# Patient Record
Sex: MALE | Race: AMERICAN INDIAN OR ALASKA NATIVE | NOT HISPANIC OR LATINO | Employment: OTHER | URBAN - METROPOLITAN AREA
[De-identification: names, ages, dates, MRNs, and addresses within clinical notes are randomized per-mention and may not be internally consistent; named-entity substitution may affect disease eponyms.]

---

## 2017-03-22 ENCOUNTER — ALLSCRIPTS OFFICE VISIT (OUTPATIENT)
Dept: OTHER | Facility: OTHER | Age: 57
End: 2017-03-22

## 2017-10-03 ENCOUNTER — ALLSCRIPTS OFFICE VISIT (OUTPATIENT)
Dept: OTHER | Facility: OTHER | Age: 57
End: 2017-10-03

## 2017-10-04 DIAGNOSIS — N52.9 MALE ERECTILE DYSFUNCTION: ICD-10-CM

## 2017-10-04 DIAGNOSIS — Z12.5 ENCOUNTER FOR SCREENING FOR MALIGNANT NEOPLASM OF PROSTATE: ICD-10-CM

## 2017-10-04 DIAGNOSIS — J30.9 ALLERGIC RHINITIS: ICD-10-CM

## 2017-10-13 ENCOUNTER — GENERIC CONVERSION - ENCOUNTER (OUTPATIENT)
Dept: OTHER | Facility: OTHER | Age: 57
End: 2017-10-13

## 2017-10-13 ENCOUNTER — LAB CONVERSION - ENCOUNTER (OUTPATIENT)
Dept: OTHER | Facility: OTHER | Age: 57
End: 2017-10-13

## 2017-10-13 LAB
PROSTATE SPECIFIC ANTIGEN TOTAL (HISTORICAL): 0.4 NG/ML
QUESTION/PROBLEM (HISTORICAL): NORMAL

## 2017-10-15 ENCOUNTER — LAB CONVERSION - ENCOUNTER (OUTPATIENT)
Dept: OTHER | Facility: OTHER | Age: 57
End: 2017-10-15

## 2017-10-15 LAB
A/G RATIO (HISTORICAL): 1.7 (CALC) (ref 1–2.5)
ALBUMIN SERPL BCP-MCNC: 4.2 G/DL (ref 3.6–5.1)
ALP SERPL-CCNC: 56 U/L (ref 40–115)
ALT SERPL W P-5'-P-CCNC: 21 U/L (ref 9–46)
AST SERPL W P-5'-P-CCNC: 23 U/L (ref 10–35)
BILIRUB SERPL-MCNC: 1.2 MG/DL (ref 0.2–1.2)
BUN SERPL-MCNC: 16 MG/DL (ref 7–25)
BUN/CREA RATIO (HISTORICAL): ABNORMAL (CALC) (ref 6–22)
CALCIUM SERPL-MCNC: 9 MG/DL (ref 8.6–10.3)
CHLORIDE SERPL-SCNC: 110 MMOL/L (ref 98–110)
CHOLEST SERPL-MCNC: 151 MG/DL
CHOLEST/HDLC SERPL: 3.5 (CALC)
CO2 SERPL-SCNC: 21 MMOL/L (ref 20–31)
CONTACT: (HISTORICAL): NORMAL
CREAT SERPL-MCNC: 1.15 MG/DL (ref 0.7–1.33)
EGFR AFRICAN AMERICAN (HISTORICAL): 83 ML/MIN/1.73M2
EGFR-AMERICAN CALC (HISTORICAL): 71 ML/MIN/1.73M2
GAMMA GLOBULIN (HISTORICAL): 2.5 G/DL (CALC) (ref 1.9–3.7)
GLUCOSE (HISTORICAL): 96 MG/DL (ref 65–99)
HDLC SERPL-MCNC: 43 MG/DL
LDL CHOLESTEROL (HISTORICAL): 91 MG/DL (CALC)
NON-HDL-CHOL (CHOL-HDL) (HISTORICAL): 108 MG/DL (CALC)
POTASSIUM SERPL-SCNC: 4.4 MMOL/L (ref 3.5–5.3)
SODIUM SERPL-SCNC: 149 MMOL/L (ref 135–146)
TEST INFORMATION (HISTORICAL): NORMAL
TEST NAME (HISTORICAL): NORMAL
TOTAL PROTEIN (HISTORICAL): 6.7 G/DL (ref 6.1–8.1)
TRIGL SERPL-MCNC: 84 MG/DL

## 2017-10-16 ENCOUNTER — GENERIC CONVERSION - ENCOUNTER (OUTPATIENT)
Dept: OTHER | Facility: OTHER | Age: 57
End: 2017-10-16

## 2017-10-21 LAB
CHLORIDE SERPL-SCNC: 106 MMOL/L (ref 98–110)
CO2 SERPL-SCNC: 28 MMOL/L (ref 20–31)
POTASSIUM SERPL-SCNC: 4 MMOL/L (ref 3.5–5.3)
SODIUM SERPL-SCNC: 141 MMOL/L (ref 135–146)

## 2017-10-23 ENCOUNTER — GENERIC CONVERSION - ENCOUNTER (OUTPATIENT)
Dept: OTHER | Facility: OTHER | Age: 57
End: 2017-10-23

## 2018-01-13 NOTE — RESULT NOTES
Verified Results  (1) LIPID PANEL, FASTING [id number] 09:00AM Mike Garcia     Test Name Result Flag Reference   CHOLESTEROL, TOTAL 151 mg/dL  <200   HDL CHOLESTEROL 43 mg/dL  >73   TRIGLICERIDES 84 mg/dL  <001   LDL-CHOLESTEROL 91 mg/dL (calc)     Reference range: <100     Desirable range <100 mg/dL for patients with CHD or  diabetes and <70 mg/dL for diabetic patients with  known heart disease  LDL-C is now calculated using the Theron-Dumont   calculation, which is a validated novel method providing   better accuracy than the Friedewald equation in the   estimation of LDL-C  Beto Souza  [address]  3225;407(87): 1175-5087   (http://[employer]/faq/RNB235)   CHOL/HDLC RATIO 3 5 (calc)  <5 0    See Below     We received your handwritten test order and  performed the AMA defined lipid panel  If  this is not what you intended to order, please  contact your local   immediately so that we may adjust our billing  appropriately  You may also inquire about  alternative or additional testing  NON HDL CHOLESTEROL 108 mg/dL (calc)  <130   For patients with diabetes plus 1 major ASCVD risk   factor, treating to a non-HDL-C goal of <100 mg/dL   (LDL-C of <70 mg/dL) is considered a therapeutic   option  (1) COMPREHENSIVE METABOLIC PANEL [id number] 54:08FB Inell Risen     Test Name Result Flag Reference   GLUCOSE 96 mg/dL  65-99   Fasting reference interval   UREA NITROGEN (BUN) 16 mg/dL  7-25   CREATININE 1 15 mg/dL  0 70-1 33   For patients >52years of age, the reference limit  for Creatinine is approximately 13% higher for people  identified as -American  eGFR NON-AFR   AMERICAN 71 mL/min/1 73m2  > OR = 60   eGFR AFRICAN AMERICAN 83 mL/min/1 73m2  > OR = 60   BUN/CREATININE RATIO   9-27   NOT APPLICABLE (calc)   SODIUM 149 mmol/L H 135-146   POTASSIUM 4 4 mmol/L  3 5-5 3   CHLORIDE 110 mmol/L     CARBON DIOXIDE 21 mmol/L  20-31   CALCIUM 9 0 mg/dL 8  6-10 3   PROTEIN, TOTAL 6 7 g/dL  6 1-8 1   ALBUMIN 4 2 g/dL  3 6-5 1   GLOBULIN 2 5 g/dL (calc)  1 9-3 7   ALBUMIN/GLOBULIN RATIO 1 7 (calc)  1 0-2 5   BILIRUBIN, TOTAL 1 2 mg/dL  0 2-1 2   ALKALINE PHOSPHATASE 56 U/L     AST 23 U/L  10-35   ALT 21 U/L  9-46     (Q) TEST AUTHORIZATION 12Oct2017 09:00AM Mike Olivarez     Test Name Result Flag Reference   TEST(S) ORDERED ON$REQUISITION      COMPREHENSIVE METABOLIC   TEST CODE: [id number]     CLIENT CONTACT: YOLANDA OLIVAREZ     REPORT ALWAYS MESSAGE$SIGNATURE See Below     The laboratory testing on this patient was verbally requested  or confirmed by the ordering physician or his or her authorized  representative after contact with an employee of [employer]  Federal regulations require that we maintain on file written  authorization for all laboratory testing  Accordingly we are asking  that the ordering physician or his or her authorized representative  sign a copy of this report and promptly return it to the client    Signature:____________________________________________________       Plan  Hypernatremia    · (1) ELECTROLYTE PANEL; Status:Active;  Requested for:16Oct2017;

## 2018-01-14 VITALS
BODY MASS INDEX: 28.1 KG/M2 | TEMPERATURE: 97 F | HEIGHT: 75 IN | DIASTOLIC BLOOD PRESSURE: 80 MMHG | WEIGHT: 226 LBS | SYSTOLIC BLOOD PRESSURE: 119 MMHG

## 2018-01-15 NOTE — PROGRESS NOTES
Assessment    1  Family history of prostate carcinoma (V16 42) (Y13 62) : Father   2  Erectile dysfunction, unspecified erectile dysfunction type (607 84) (N52 9)   3  Encounter for preventive health examination (V70 0) (Z00 00)   4  Allergic rhinitis (477 9) (J30 9)    Plan  Allergic rhinitis    · (1) CBC/ PLT (NO DIFF); Status:Active; Requested for:04Oct2017;    · (1) CHROMOSOME ANALYSIS, LEUKEMIA/LYMPHOMA; Status:Active; Requested  [id number]; Allergic rhinitis, Encounter for prostate cancer screening    · (1) PSA (SCREEN) (Dx V76 44 Screen for Prostate Cancer); Status:Active; Requested  [id number];   Encounter for prostate cancer screening    · COLONOSCOPY; Status:Active; Requested for:04Oct2017;   Encounter for prostate cancer screening, Erectile dysfunction, unspecified erectile  dysfunction type    · (1) LIPID PANEL, FASTING; Status:Active; Requested [id number];   Erectile dysfunction, unspecified erectile dysfunction type    · Viagra 100 MG Oral Tablet; TAKE 1 TABLET DAILY 1 HOUR BEFORE NEEDED    Discussion/Summary  health maintenance visit eats an adequate diet Currently, he has an inadequate exercise regimen  Prostate cancer screening: the risks and benefits of prostate cancer screening were discussed, prostate cancer screening is current and Family history of prostate carcinoma  Testicular cancer screening: the risks and benefits of testicular cancer screening were discussed and clinical testicular exam was done today  Colorectal cancer screening: the risks and benefits of colorectal cancer screening were discussed and colonoscopy has been ordered  The Needs flu vaccination  Advice and education were given regarding nutrition, aerobic exercise and weight loss  In summary then this is a 19-year-old male here for health maintenance visit  He does have a family history of prostate carcinoma in his father  We'll have him return for CBC CMP lipids and a PSA   He also has some element erectile dysfunction and we give him a prescription for Viagra to try at his request  He also needs to have a colonoscopy which we suggested  His prostate examination today is normal       Chief Complaint  I feel like I am coming down with a bug  History of Present Illness  HM, Adult Male: The patient is being seen for a health maintenance evaluation  Social History: Household members include spouse  He is   Work status: working full time and occupation:   The patient has never smoked cigarettes  He reports never drinking alcohol  He has never used illicit drugs  General Health: The patient's health since the last visit is described as good   C/o ED which is incomplete  He has regular dental visits  He complains of vision problems  Vision care includes wearing glasses  He denies hearing loss  Immunizations status: up to date  Lifestyle:  He consumes a diverse and healthy diet  He has weight concerns  He does not exercise regularly  He does not use tobacco  He denies alcohol use  He denies drug use  Reproductive health:  the patient is sexually active  He complains of erectile dysfunction  He is interested in treatment for erectile dysfunction  Screening: Prostate cancer screening includes no previous evaluation  Colorectal cancer screening includes no previous screening and Needs colonoscopy  (Father  due to prostate ca  )  Metabolic screening includes lipid profile performed within the past five years  HPI: The patient is a 82-year-old male here for a health maintenance visit  He has no specific complaints currently other than recently developed what he feels may be a viral illness  He is a  and spends a lot of time in contact with individuals in relation to his employment  He has some nasal congestion and postnasal drip etc  He's had no fever  No chest pain or shortness of breath no GI or  symptoms        Review of Systems    Constitutional: no recent weight gain, not feeling tired and no recent weight loss  Cardiovascular: no chest pain, no palpitations and no extremity edema  Respiratory: no shortness of breath, no cough and no shortness of breath during exertion  Gastrointestinal: no constipation, no diarrhea and no blood in stools  Genitourinary: ED as noted above, but no urinary hesitancy, no incontinence and no nocturia  Integumentary: no skin lesions  Neurological: no headache and no dizziness  Psychiatric: no anxiety, no sleep disturbances and no depression  Active Problems    1  Allergic rhinitis (477 9) (J30 9)   2  Cervical radiculitis (723 4) (M54 12)   3  Changing pigmented skin lesion (216 9) (L81 9)    Past Medical History    · History of Acute upper respiratory infection (465 9) (J06 9)   · History of Nonvenomous Insect Bite Of Leg (916 4)    Family History  Father    · Family history of prostate carcinoma (V16 42) (Z80 45)    Social History    · Denied: Alcohol   · Never A Smoker   · Denied: Tobacco Use (305 1)    Allergies    1  Penicillins    Vitals   Recorded: [id number] 97:98XM   Systolic 019   Diastolic 84   Height 6 ft 2 5 in   Weight 225 lb    BMI Calculated 28 5   BSA Calculated 2 3     Physical Exam    Constitutional   General appearance: Abnormal   Overweight in no apparent distress  Eyes   Conjunctiva and lids: No erythema, swelling or discharge  Pupils and irises: Equal, round, reactive to light  Ears, Nose, Mouth, and Throat   Otoscopic examination: Tympanic membranes translucent with normal light reflex  Canals patent without erythema  Nasal mucosa, septum, and turbinates: Abnormal   Mucoid nasal congestion and postnasal drip  Lips, teeth, and gums: Normal, good dentition  Oropharynx: Normal with no erythema, edema, exudate or lesions  No lesion  Neck   Neck: Supple, symmetric, trachea midline, no masses  No adenopathy  Thyroid: Normal, no thyromegaly  No enlargement or mass     Pulmonary Respiratory effort: No increased work of breathing or signs of respiratory distress  Auscultation of lungs: Clear to auscultation  Clear to auscultation  Cardiovascular   Auscultation of heart: Normal rate and rhythm, normal S1 and S2, no murmurs  Regular rhythm with a normal rate no murmur or gallop  Carotid pulses: 2+ bilaterally  Normal carotid upstroke without bruit  Examination of extremities for edema and/or varicosities: Normal     Abdomen   Abdomen: Non-tender, no masses  No mass  Liver and spleen: No hepatomegaly or splenomegaly  No organomegaly  Examination for hernias: No hernias appreciated  No hernia  Anus, perineum, and rectum: Normal sphincter tone, no masses, no prolapse  Normal tone and no rectal mass  Genitourinary   Scrotal contents: Normal testes, no masses  testes normally descended without atrophy or mass  Penis: Normal, no lesions  Digital rectal exam of prostate: Normal size, no masses  Prostate is normal size for age without enlargement nodularity etc    Lymphatic   Palpation of lymph nodes in neck: No lymphadenopathy  Musculoskeletal   Gait and station: Normal     Skin   Examination of the skin for lesions: Abnormal   1 nevus of the lower midline back  It is unchanged from previous examination  We had suggested that he see dermatology in the past and we again suggest this though I feel it's likely benign     Psychiatric   Orientation to person, place and time: Normal     Mood and affect: Normal        Signatures   Electronically signed by : ALEX Kim ; Oct  4 2017  8:28AM EST                       (Author)

## 2018-01-15 NOTE — RESULT NOTES
Verified Results  (1) PSA (SCREEN) (Dx V76 44 Screen for Prostate Cancer) [id number] 03:00AM Yudith Og     Test Name Result Flag Reference   PSA, TOTAL 0 4 ng/mL  < OR = 4 0   The total PSA value from this assay system is   standardized against the WHO standard  The test   result will be approximately 20% lower when compared   to the equimolar-standardized total PSA (Nik   Tyler)  Comparison of serial PSA results should be   interpreted with this fact in mind  This test was performed using the Siemens   chemiluminescent method  Values obtained from   different assay methods cannot be used  interchangeably  PSA levels, regardless of  value, should not be interpreted as absolute  evidence of the presence or absence of disease  (1) LIPID PANEL, FASTING [id number] 03:00AM Mike Garcia     Test Name Result Flag Reference   CHOLESTEROL, TOTAL 151 mg/dL  <200   HDL CHOLESTEROL 43 mg/dL  >35   TRIGLICERIDES 84 mg/dL  <044   LDL-CHOLESTEROL 91 mg/dL (calc)     Reference range: <100     Desirable range <100 mg/dL for patients with CHD or  diabetes and <70 mg/dL for diabetic patients with  known heart disease  LDL-C is now calculated using the Theron-Dumont   calculation, which is a validated novel method providing   better accuracy than the Friedewald equation in the   estimation of LDL-C  Jose Bowser  [place] [id number];113(37): 1195-8916   (http://[employer]/faq/PKE775)   CHOL/HDLC RATIO 3 5 (calc)  <5 0    See Below     We received your handwritten test order and  performed the AMA defined lipid panel  If  this is not what you intended to order, please  contact your local   immediately so that we may adjust our billing  appropriately  You may also inquire about  alternative or additional testing     NON HDL CHOLESTEROL 108 mg/dL (calc)  <130   For patients with diabetes plus 1 major ASCVD risk   factor, treating to a non-HDL-C goal of <100 mg/dL   (LDL-C of <70 mg/dL) is considered a therapeutic   option  (Q) CBC (H/H, RBC, INDICES, WBC, PLT) [id number] 03:00AM Mike Garcia     Test Name Result Flag Reference   WHITE BLOOD CELL COUNT 5 0 Thousand/uL  3 8-10 8   RED BLOOD CELL COUNT 5 23 Million/uL  4 20-5 80   HEMOGLOBIN 15 7 g/dL  13 2-17 1   HEMATOCRIT 47 5 %  38 5-50 0   MCV 90 8 fL  80 0-100 0   MCH 30 0 pg  27 0-33 0   MCHC 33 1 g/dL  32 0-36 0   RDW 13 0 %  11 0-15 0   PLATELET COUNT 326 Thousand/uL  140-400   WE RECEIVED YOUR HANDWRITTEN TEST ORDER AND  PERFORMED A HEMOGRAM WITH A PLATELET WITHOUT  A DIFFERENTIAL  IF THIS IS NOT WHAT YOU INTENDED  TO ORDER, PLEASE CONTACT YOUR LOCAL CLIENT SERVICE  REPRESENTATIVE IMMEDIATELY SO THAT WE CAN   ADJUST OUR BILLING APPROPRIATELY  YOU MAY ALSO   INQUIRE ABOUT ALTERNATIVE OR ADDITIONAL TESTING  MPV 11 4 fL  7 5-12 5           TEST IN QUESTION- AMBIGUOUS ORDER [id number] 03:00AM Mary Garciamarie     Test Name Result Flag Reference   WE ARE UNABLE TO ASCERTAIN THE TEST(S) YOU DESIRE  FROM THE FOLLOWING WRITTEN ORDER  UNCLEAR ORDER:      CLARIFY CHROMOSOME BEBO & LEUKEMIA/ LYMP   SPECIMEN(S) SUBMITTED: [facility]     RESOLUTION:      **     **     *******     *******     *********    **     **   ******   **     **     **   **     **          **           ****   **     **   **     **     ** ***      **          **           **  ** **     **   **     **     *****       ** *****    *******      **    ***     **   **     **     **   **     **     **   **           **     **     **   **     **     **    **    **     **   **           **     **     **   *********     **    **    *********   *********    **     **     **      NO COLLECTION DATE RECEIVED  WE HAVE USED  THE DATE THE SPECIMEN WAS RECEIVED BY THIS  LABORATORY AS THE COLLECTION DATE  IF THIS  IS INCORRECT, PLEASE CONTACT CLIENT SERVICES    PHONE NUMBER: [phone]

## 2018-01-16 NOTE — RESULT NOTES
Verified Results  (1) ELECTROLYTE PANEL [id number] 12:00AM Tierney Shutter     Test Name Result Flag Reference   SODIUM 141 mmol/L  135-146   POTASSIUM 4 0 mmol/L  3 5-5 3   CHLORIDE 106 mmol/L     CARBON DIOXIDE 28 mmol/L  20-31

## 2018-01-22 VITALS
BODY MASS INDEX: 27.98 KG/M2 | DIASTOLIC BLOOD PRESSURE: 84 MMHG | SYSTOLIC BLOOD PRESSURE: 128 MMHG | HEIGHT: 75 IN | WEIGHT: 225 LBS

## 2018-01-31 ENCOUNTER — OFFICE VISIT (OUTPATIENT)
Dept: FAMILY MEDICINE CLINIC | Facility: CLINIC | Age: 58
End: 2018-01-31
Payer: COMMERCIAL

## 2018-01-31 VITALS
TEMPERATURE: 101 F | SYSTOLIC BLOOD PRESSURE: 100 MMHG | WEIGHT: 225 LBS | DIASTOLIC BLOOD PRESSURE: 68 MMHG | BODY MASS INDEX: 28.5 KG/M2

## 2018-01-31 DIAGNOSIS — J11.1 INFLUENZA: Primary | ICD-10-CM

## 2018-01-31 PROBLEM — N52.9 ERECTILE DYSFUNCTION: Status: ACTIVE | Noted: 2017-10-03

## 2018-01-31 PROBLEM — M54.12 CERVICAL RADICULITIS: Status: ACTIVE | Noted: 2017-03-22

## 2018-01-31 PROBLEM — E87.0 HYPERNATREMIA: Status: RESOLVED | Noted: 2017-10-16 | Resolved: 2018-01-31

## 2018-01-31 PROBLEM — M54.12 CERVICAL RADICULITIS: Status: RESOLVED | Noted: 2017-03-22 | Resolved: 2018-01-31

## 2018-01-31 PROBLEM — E87.0 HYPERNATREMIA: Status: ACTIVE | Noted: 2017-10-16

## 2018-01-31 PROBLEM — L81.9 CHANGING PIGMENTED SKIN LESION: Status: ACTIVE | Noted: 2017-03-22

## 2018-01-31 PROCEDURE — 99213 OFFICE O/P EST LOW 20 MIN: CPT | Performed by: FAMILY MEDICINE

## 2018-01-31 RX ORDER — SILDENAFIL 100 MG/1
1 TABLET, FILM COATED ORAL
COMMUNITY
Start: 2017-10-03 | End: 2020-01-20 | Stop reason: ALTCHOICE

## 2018-01-31 RX ORDER — OSELTAMIVIR PHOSPHATE 75 MG/1
75 CAPSULE ORAL EVERY 12 HOURS SCHEDULED
Qty: 10 CAPSULE | Refills: 0 | Status: SHIPPED | OUTPATIENT
Start: 2018-01-31 | End: 2018-02-05

## 2018-01-31 NOTE — PROGRESS NOTES
Assessment/Plan:    Influenza  In summary then the patient is is a 72-year-old male who presents today with the onset of acute febrile respiratory viral illness which appears consistent with influenza  We are going to treat him with Tamiflu 75 b i d  for 5 days  He is going to push fluids, rest and use antipyretics as needed  I see no evidence of secondary infection presently  Should he develop shortness of breath productive cough, change in mentation or other progressive symptoms he is asked to call back or seek more urgent medical attention  He agrees  Diagnoses and all orders for this visit:    Influenza  -     oseltamivir (TAMIFLU) 75 mg capsule; Take 1 capsule (75 mg total) by mouth every 12 (twelve) hours for 5 days    Other orders  -     sildenafil (VIAGRA) 100 mg tablet; Take 1 tablet by mouth          Subjective: I have been coughing up yellow phlegm starting yesterday  I have Has, back ache  Vomited x 1  Nasal congestion but no ST  Bedridden for 2 days  No flu vaccine  Wife well  Was at a convention event at a  last week  No hemoptysis  No wheeze or SOB  Patient ID: Anatoliy Caballero is a 56 y o  male  HPI the patient is a 72-year-old male who presents today stating that he started with some mild respiratory symptoms 2 days ago  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 were coughing  He states that yesterday he started with a significant cough, headache and myalgias  He felt nauseous and vomited 1 time  He has had nasal congestion but no sore throat  He states that he has not been out of bed since yesterday prior to coming to the doctor today  He has had no flu vaccine  His wife is well  He has had no wheezing no hemoptysis no PND orthopnea chest pain edema X cetera  He has felt feverish though he did not document his temperature      The following portions of the patient's history were reviewed and updated as appropriate: allergies, current medications, past family history, past medical history, past social history, past surgical history and problem list     Review of Systems   Constitutional: Positive for activity change, appetite change, chills, fatigue and fever  HENT: Positive for congestion  Negative for ear pain, sinus pressure and sore throat  Respiratory: Positive for cough  Negative for choking, chest tightness, shortness of breath and wheezing  Cardiovascular: Negative for chest pain, palpitations and leg swelling  Gastrointestinal: Positive for nausea and vomiting  Negative for abdominal pain and diarrhea  Musculoskeletal: Positive for back pain and myalgias  Neurological: Positive for light-headedness  Hematological: Negative for adenopathy  Psychiatric/Behavioral: Negative for confusion  Objective:     Physical Exam   Constitutional: He is oriented to person, place, and time  He appears well-developed and well-nourished  HENT:   Mouth/Throat: Oropharynx is clear and moist  No oropharyngeal exudate  Mucosa moist   Eyes: Conjunctivae are normal  Scleral icterus is present  Neck: Neck supple  No JVD present  No thyromegaly present  Cardiovascular: Normal rate and regular rhythm  Exam reveals no gallop  No murmur heard  Heart rate is normal   Pulmonary/Chest: Breath sounds normal  No respiratory distress  He has no wheezes  He has no rales  Lungs are clear   Abdominal: Soft  There is no tenderness  Musculoskeletal: He exhibits no edema  Lymphadenopathy:     He has no cervical adenopathy  Neurological: He is alert and oriented to person, place, and time  Skin: Skin is dry  Psychiatric: He has a normal mood and affect

## 2018-01-31 NOTE — ASSESSMENT & PLAN NOTE
In summary then the patient is is a 72-year-old male who presents today with the onset of acute febrile respiratory viral illness which appears consistent with influenza  We are going to treat him with Tamiflu 75 b i d  for 5 days  He is going to push fluids, rest and use antipyretics as needed  I see no evidence of secondary infection presently  Should he develop shortness of breath productive cough, change in mentation or other progressive symptoms he is asked to call back or seek more urgent medical attention  He agrees

## 2018-02-12 ENCOUNTER — OFFICE VISIT (OUTPATIENT)
Dept: FAMILY MEDICINE CLINIC | Facility: CLINIC | Age: 58
End: 2018-02-12
Payer: COMMERCIAL

## 2018-02-12 VITALS — SYSTOLIC BLOOD PRESSURE: 152 MMHG | TEMPERATURE: 96.4 F | DIASTOLIC BLOOD PRESSURE: 98 MMHG

## 2018-02-12 DIAGNOSIS — J18.9 COMMUNITY ACQUIRED PNEUMONIA OF LEFT LOWER LOBE OF LUNG: Primary | ICD-10-CM

## 2018-02-12 PROCEDURE — 99214 OFFICE O/P EST MOD 30 MIN: CPT | Performed by: FAMILY MEDICINE

## 2018-02-12 RX ORDER — METHYLPREDNISOLONE 4 MG/1
TABLET ORAL
Qty: 21 TABLET | Refills: 0 | Status: SHIPPED | OUTPATIENT
Start: 2018-02-12 | End: 2018-07-02

## 2018-02-12 RX ORDER — AZITHROMYCIN 250 MG/1
TABLET, FILM COATED ORAL
Qty: 6 TABLET | Refills: 0 | Status: SHIPPED | OUTPATIENT
Start: 2018-02-12 | End: 2018-02-16

## 2018-02-12 NOTE — PATIENT INSTRUCTIONS
The patient appears to have a mild community-acquired pneumonia after a case of influenza recently  We are going to start him on azithromycin as well as a Medrol Dosepak and have him push fluids and rest   He is going to go for an outpatient x-ray and follow up with us when his results are available

## 2018-02-12 NOTE — PROGRESS NOTES
Assessment/Plan:  Community acquired pneumonia of left lower lobe of lung ([facility] [address] )    Patient has a persistent cough of 3 weeks after influenza with some yellow sputum  He has had fatigue as well which has been significant  I believe based on his clinical condition as well as physical examination that he may have a community-acquired pneumonia  We are going to start him on azithromycin as well as a Medrol Dosepak  He is asked push fluids and rest   We also asked him to go for a chest x-ray  Will follow up with him when results are available  He agrees  Will discuss with him when results of his x-ray are available  He will call sooner or seek more urgent medical attention should his condition deteriorate  He agrees  Diagnoses and all orders for this visit:    [facility]  -     azithromycin (ZITHROMAX) 250 mg tablet; 2 stat and then 1 QD  -     Methylprednisolone 4 MG TBPK; Use as directed on package  -     XR chest pa & lateral          Subjective:   Chief Complaint   Patient presents with    Cough     X 3 weeks, taking Mucinex DM and NiQuil    Fatigue     I feel really tired  I cant kick the cough  Cant work more than 4-6 hours  I can usually work 10-12  Mucinex DM and NyQuil seem to help  Feverish feeling at times  Some sputum is yellow  Wheeze without CP or SOB  No URI sx  Patient ID: Malathi Mathur is a 56 y o  male  HPI  The patient is a 82-year-old  who was seen approximately 2 weeks ago with what was felt to be case of influenza and he was treated with Tamiflu  He states that since that time he has had a persistent cough  He states that he has excessive fatigue and some yellow sputum  He has been wheezing but he has had no definitive chest pain or shortness of breath  He has minimal upper respiratory symptoms  He has been using Mucinex DM as well as NyQuil which gives him some temporary symptom relief    He has had no nausea vomiting diarrhea  No weight loss or anorexia  The following portions of the patient's history were reviewed and updated as appropriate: allergies, current medications, past family history, past medical history, past social history, past surgical history and problem list     Review of Systems   Constitution: Positive for chills, weakness and malaise/fatigue  Negative for decreased appetite and night sweats  HENT: Negative for congestion and sore throat  Cardiovascular: Negative for chest pain, cyanosis, leg swelling, orthopnea and paroxysmal nocturnal dyspnea  Respiratory: Positive for cough, sputum production and wheezing  Negative for hemoptysis and shortness of breath  Neurological: Negative for dizziness and headaches  Objective:    Physical Exam   Constitutional: He is oriented to person, place, and time  He appears well-developed and well-nourished  No distress  No apparent distress but fatigued appearing   HENT:   Mouth/Throat: Oropharynx is clear and moist  No oropharyngeal exudate  Neck: No JVD present  No thyromegaly present  Cardiovascular: Normal rate and regular rhythm  Exam reveals no gallop  No murmur heard  Pulmonary/Chest: He has no wheezes  He has rales  I do hear some minimal crackles in the left mid to lower lung fields  Air exchange is normal I hear no  Rhonchi or wheezing presently  Musculoskeletal: He exhibits no edema  Lymphadenopathy:     He has no cervical adenopathy  Neurological: He is alert and oriented to person, place, and time  Skin: No erythema  Psychiatric: He has a normal mood and affect

## 2018-02-12 NOTE — ASSESSMENT & PLAN NOTE
Patient has a persistent cough of 3 weeks after influenza with some yellow sputum  He has had fatigue as well which has been significant  I believe based on his clinical condition as well as physical examination that he may have a community-acquired pneumonia  We are going to start him on azithromycin as well as a Medrol Dosepak  He is asked push fluids and rest   We also asked him to go for a chest x-ray  Will follow up with him when results are available  He agrees  Will discuss with him when results of his x-ray are available  He will call sooner or seek more urgent medical attention should his condition deteriorate  He agrees

## 2018-02-13 ENCOUNTER — HOSPITAL ENCOUNTER (OUTPATIENT)
Dept: RADIOLOGY | Facility: HOSPITAL | Age: 58
Discharge: HOME | End: 2018-02-13
Payer: COMMERCIAL

## 2018-02-13 ENCOUNTER — TRANSCRIBE ORDERS (OUTPATIENT)
Dept: ADMINISTRATIVE | Facility: HOSPITAL | Age: 58
End: 2018-02-13

## 2018-02-13 PROCEDURE — 71046 X-RAY EXAM CHEST 2 VIEWS: CPT

## 2018-02-20 ENCOUNTER — HOSPITAL ENCOUNTER (EMERGENCY)
Facility: HOSPITAL | Age: 58
Discharge: HOME | End: 2018-02-20
Attending: EMERGENCY MEDICINE | Admitting: EMERGENCY MEDICINE
Payer: COMMERCIAL

## 2018-02-20 VITALS
DIASTOLIC BLOOD PRESSURE: 84 MMHG | TEMPERATURE: 98.7 F | BODY MASS INDEX: 28.5 KG/M2 | RESPIRATION RATE: 18 BRPM | SYSTOLIC BLOOD PRESSURE: 148 MMHG | OXYGEN SATURATION: 96 % | WEIGHT: 225 LBS | HEART RATE: 81 BPM

## 2018-02-20 DIAGNOSIS — Z20.3 RABIES EXPOSURE: Primary | ICD-10-CM

## 2018-02-20 PROCEDURE — 90675 RABIES VACCINE IM: CPT | Performed by: EMERGENCY MEDICINE

## 2018-02-20 PROCEDURE — 99283 EMERGENCY DEPT VISIT LOW MDM: CPT

## 2018-02-20 PROCEDURE — 90471 IMMUNIZATION ADMIN: CPT

## 2018-02-20 PROCEDURE — 90376 RABIES IG HEAT TREATED: CPT | Performed by: EMERGENCY MEDICINE

## 2018-02-20 PROCEDURE — 96372 THER/PROPH/DIAG INJ SC/IM: CPT

## 2018-02-20 RX ADMIN — RABIES VACCINE 1 ML: KIT at 16:19

## 2018-02-20 RX ADMIN — HUMAN RABIES VIRUS IMMUNE GLOBULIN 2040 UNITS: 150 INJECTION, SOLUTION INTRAMUSCULAR at 16:20

## 2018-02-21 NOTE — ED PROVIDER NOTES
History  Chief Complaint   Patient presents with    Rabies Test     Pt presents to the ED for evaluation of rabies after rabid raccoon has been eatting out of their cat food, pt has been handling cat bowls, cats and cat food wile rabid tatiana was around  56 YR  MALE  BELIEVES WAS EXPOSED TO  SALIVA OF RAPID RACARNOL WHO WAS ON HIS PROPERTY AND WAS EATING FROM HIS CATS OUTDOOR BOWEL- RACOON HAS BEEN REMOVED BY  ANIMAL CONTROL- PT WITH NO BITE/SCRATCH--  PT HAS NO COMPS        History provided by:  Patient and spouse   used: No        Prior to Admission Medications   Prescriptions Last Dose Informant Patient Reported? Taking? Methylprednisolone 4 MG TBPK   No No   Sig: Use as directed on package   sildenafil (VIAGRA) 100 mg tablet   Yes No   Sig: Take 1 tablet by mouth      Facility-Administered Medications: None       History reviewed  No pertinent past medical history  History reviewed  No pertinent surgical history  History reviewed  No pertinent family history  I have reviewed and agree with the history as documented  Social History   Substance Use Topics    Smoking status: Never Smoker    Smokeless tobacco: Never Used    Alcohol use No        Review of Systems   Constitutional: Negative  HENT: Negative  Eyes: Negative  Respiratory: Negative  Cardiovascular: Negative  Gastrointestinal: Negative  Endocrine: Negative  Genitourinary: Negative  Musculoskeletal: Negative  Skin: Negative  Allergic/Immunologic: Negative  Neurological: Negative  Hematological: Negative  Psychiatric/Behavioral: Negative          Physical Exam  ED Triage Vitals [02/20/18 1308]   Temperature Pulse Respirations Blood Pressure SpO2   98 7 °F (37 1 °C) 81 18 148/84 96 %      Temp Source Heart Rate Source Patient Position - Orthostatic VS BP Location FiO2 (%)   Oral Monitor Sitting Left arm --      Pain Score       No Pain           Orthostatic Vital Signs  Vitals: 02/20/18 1308   BP: 148/84   Pulse: 81   Patient Position - Orthostatic VS: Sitting       Physical Exam   Constitutional: He is oriented to person, place, and time  He appears well-developed and well-nourished  No distress  AVSS- PULSE OX 96 % ON RA- INTERPETATION IS NORMAL- NO INTERVENTION    HENT:   Head: Normocephalic and atraumatic  Eyes: Conjunctivae and EOM are normal  Pupils are equal, round, and reactive to light  Right eye exhibits no discharge  Left eye exhibits no discharge  No scleral icterus  Neck: Normal range of motion  Neck supple  No JVD present  No tracheal deviation present  No thyromegaly present  Cardiovascular: Normal rate, regular rhythm, normal heart sounds and intact distal pulses  Exam reveals no gallop and no friction rub  No murmur heard  Pulmonary/Chest: Effort normal and breath sounds normal  No stridor  No respiratory distress  He has no wheezes  He has no rales  He exhibits no tenderness  Abdominal: Soft  Bowel sounds are normal  He exhibits no distension and no mass  There is no tenderness  There is no rebound and no guarding  No hernia  Musculoskeletal: Normal range of motion  He exhibits no edema, tenderness or deformity  Lymphadenopathy:     He has no cervical adenopathy  Neurological: He is alert and oriented to person, place, and time  No cranial nerve deficit or sensory deficit  He exhibits normal muscle tone  Coordination normal    Skin: Skin is warm  Capillary refill takes less than 2 seconds  No rash noted  He is not diaphoretic  No erythema  No pallor  Psychiatric: He has a normal mood and affect  His behavior is normal  Judgment and thought content normal    Nursing note and vitals reviewed        ED Medications  Medications   rabies vaccine, chick embryo (RABAVERT) IM injection 1 mL (1 mL Intramuscular Given 2/20/18 1619)   rabies immune globulin, human (IMOGAM RABIES-HT) IM injection 2,040 Units (2,040 Units Intramuscular Given 2/20/18 1620) Diagnostic Studies  Results Reviewed     None                 No orders to display              Procedures  Procedures       Phone Contacts  ED Phone Contact    ED Course  ED Course                                MDM  CritCare Time    Disposition  Final diagnoses:   Rabies exposure     Time reflects when diagnosis was documented in both MDM as applicable and the Disposition within this note     Time User Action Codes Description Comment    2/20/2018  4:15 PM [place] Pon Add [Z20 3] Rabies exposure       ED Disposition     ED Disposition Condition Comment    Discharge  Tatiana Mcdaniels discharge to home/self care  Condition at discharge: Good        Follow-up Information    None       Discharge Medication List as of 2/20/2018  4:17 PM      CONTINUE these medications which have NOT CHANGED    Details   Methylprednisolone 4 MG TBPK Use as directed on package, Normal      sildenafil (VIAGRA) 100 mg tablet Take 1 tablet by mouth, Starting Tue 10/3/2017, Historical Med           No discharge procedures on file      ED Provider  Electronically Signed by           Linn Street MD  02/20/18 2019

## 2018-02-23 ENCOUNTER — HOSPITAL ENCOUNTER (EMERGENCY)
Facility: HOSPITAL | Age: 58
Discharge: HOME | End: 2018-02-23
Attending: EMERGENCY MEDICINE
Payer: COMMERCIAL

## 2018-02-23 VITALS
DIASTOLIC BLOOD PRESSURE: 92 MMHG | TEMPERATURE: 98.3 F | BODY MASS INDEX: 29.39 KG/M2 | HEIGHT: 74 IN | SYSTOLIC BLOOD PRESSURE: 160 MMHG | RESPIRATION RATE: 18 BRPM | WEIGHT: 229 LBS | OXYGEN SATURATION: 96 % | HEART RATE: 90 BPM

## 2018-02-23 DIAGNOSIS — Z23 ENCOUNTER FOR REPEAT ADMINISTRATION OF RABIES VACCINATION: Primary | ICD-10-CM

## 2018-02-23 PROCEDURE — 99281 EMR DPT VST MAYX REQ PHY/QHP: CPT

## 2018-02-23 PROCEDURE — 90675 RABIES VACCINE IM: CPT | Performed by: EMERGENCY MEDICINE

## 2018-02-23 PROCEDURE — 96372 THER/PROPH/DIAG INJ SC/IM: CPT

## 2018-02-23 RX ADMIN — RABIES VACCINE 1 ML: KIT at 23:14

## 2018-02-24 NOTE — ED PROVIDER NOTES
History  Chief Complaint   Patient presents with    Follow Up Rabies     2nd rabies series due today      Patient is a 56year old male who is here for 2nd rabies vaccine series (day 3) after possible exposure to rabies on 2/20/18  No fever  No rash  No complications from prior rabies vaccine  [facility] website checked on this patient and no Rx found  History provided by:  Patient   used: No        Prior to Admission Medications   Prescriptions Last Dose Informant Patient Reported? Taking? Methylprednisolone 4 MG TBPK   No No   Sig: Use as directed on package   sildenafil (VIAGRA) 100 mg tablet   Yes No   Sig: Take 1 tablet by mouth      Facility-Administered Medications: None       History reviewed  No pertinent past medical history  History reviewed  No pertinent surgical history  No family history on file  I have reviewed and agree with the history as documented  Social History   Substance Use Topics    Smoking status: Never Smoker    Smokeless tobacco: Never Used    Alcohol use No        Review of Systems   Constitutional: Negative for fever  Skin: Negative for rash  Physical Exam  ED Triage Vitals [02/23/18 2232]   Temperature Pulse Respirations Blood Pressure SpO2   98 3 °F (36 8 °C) 90 18 160/92 96 %      Temp Source Heart Rate Source Patient Position - Orthostatic VS BP Location FiO2 (%)   Oral Monitor Standing Left arm --      Pain Score       No Pain           Orthostatic Vital Signs  Vitals:    02/23/18 2232   BP: 160/92   Pulse: 90   Patient Position - Orthostatic VS: Standing       Physical Exam   Constitutional: He appears well-developed and well-nourished  No distress  Neurological: He is alert  Skin: Skin is warm and dry  No rash noted  No erythema  Nursing note and vitals reviewed        ED Medications  Medications   rabies vaccine, chick embryo (RABAVERT) IM injection 1 mL (not administered)       Diagnostic Studies  Results Reviewed None                 No orders to display              Procedures  Procedures       Phone Contacts  ED Phone Contact    ED Course  ED Course                                MDM  Number of Diagnoses or Management Options  Diagnosis management comments: DDx including but not limited to: Repeat rabies vaccination, follow rabies vaccination series, wound check; doubt adverse reaction or infection  Amount and/or Complexity of Data Reviewed  Decide to obtain previous medical records or to obtain history from someone other than the patient: yes  Review and summarize past medical records: yes      CritCare Time    Disposition  Final diagnoses:   Encounter for repeat administration of rabies vaccination     Time reflects when diagnosis was documented in both MDM as applicable and the Disposition within this note     Time User Action Codes Description Comment    2/23/2018 11:00 PM Rodrick, [address]  Encounter for repeat administration of rabies vaccination       ED Disposition     ED Disposition Condition Comment    Discharge  Worland Goodell discharge to home/self care  Condition at discharge: Stable        Follow-up Information     Follow up With Specialties Details Why Contact Info Additional [address] Emergency Department Emergency Medicine Go to for day 7 (this upcoming Tuesday) for 3rd vaccine  Return sooner if rash, fever, vomiting, difficulty breathing  [address] [address]  [phone] [facility], [address], [place], [place], [address]        Patient's Medications   Discharge Prescriptions    No medications on file     No discharge procedures on file      ED Provider  Electronically Signed by           Carlos Bradford MD  02/23/18 4663

## 2018-02-24 NOTE — DISCHARGE INSTRUCTIONS
Rabies Vaccine   WHAT YOU NEED TO KNOW:   The rabies vaccine is an injection given to help prevent a rabies virus infection  The virus is spread to humans through the bite of an infected animal  Dogs, bats, skunks, coyotes, raccoons, and foxes are examples of animals that can carry rabies  The rabies vaccine can protect you from being infected with the virus  The vaccine can also prevent you from developing rabies even if you get it after you were bitten by an animal    DISCHARGE INSTRUCTIONS:   Call 911 for any of the following:   · Your mouth and throat are swollen  · You are wheezing or have trouble breathing  · You have chest pain or your heart is beating faster than normal for you  · You feel like you are going to faint  Return to the emergency department if:   · Your face is red or swollen  · You have hives that spread over your body  · You feel weak or dizzy  Contact your healthcare provider if:   · You have increased pain, redness, or swelling around the area where the shot was given  · You have questions or concerns about the rabies vaccine  Apply a warm compress  to the injection area as directed to decrease pain and swelling  Follow up with your healthcare provider as directed:  Write down your questions so you remember to ask them during your visits  © 2017 [facility] Information is for End User's use only and may not be sold, redistributed or otherwise used for commercial purposes  All illustrations and images included in CareNotes® are the copyrighted property of [employer]  or Domenic Armas  The above information is an  only  It is not intended as medical advice for individual conditions or treatments  Talk to your doctor, nurse or pharmacist before following any medical regimen to see if it is safe and effective for you

## 2018-02-24 NOTE — ED NOTES
Pt presents for 2nd rabies shot, pt with no issues with first injection     Kristine Cervantes, VICENTE  02/23/18 5929

## 2018-02-27 ENCOUNTER — HOSPITAL ENCOUNTER (EMERGENCY)
Facility: HOSPITAL | Age: 58
Discharge: HOME | End: 2018-02-27
Attending: EMERGENCY MEDICINE | Admitting: EMERGENCY MEDICINE
Payer: COMMERCIAL

## 2018-02-27 VITALS
DIASTOLIC BLOOD PRESSURE: 89 MMHG | HEART RATE: 73 BPM | SYSTOLIC BLOOD PRESSURE: 142 MMHG | OXYGEN SATURATION: 97 % | BODY MASS INDEX: 28.89 KG/M2 | RESPIRATION RATE: 14 BRPM | WEIGHT: 225 LBS

## 2018-02-27 DIAGNOSIS — Z23 ENCOUNTER FOR REPEAT ADMINISTRATION OF RABIES VACCINATION: Primary | ICD-10-CM

## 2018-02-27 PROCEDURE — 90675 RABIES VACCINE IM: CPT | Performed by: EMERGENCY MEDICINE

## 2018-02-27 PROCEDURE — 90471 IMMUNIZATION ADMIN: CPT

## 2018-02-27 PROCEDURE — 99282 EMERGENCY DEPT VISIT SF MDM: CPT

## 2018-02-27 RX ADMIN — RABIES VACCINE 1 ML: KIT at 22:29

## 2018-02-28 NOTE — ED PROVIDER NOTES
History  Chief Complaint   Patient presents with    Follow Up Rabies     Pt here for 3rd rabies follow up       53-year-old male who is here for repeat rabies vaccine patient was exposed to a rabid raccoon who was eating out of his dogs food and water bowl  Patient has not had any reaction to the previous injections  History provided by:  Patient   used: No    Medical Problem - Re-Evaluation   Location:  Exposure to rapid raccoon  Quality:  Possible exposure through saliva in dog bowl  Severity:  Mild  Duration:  1 week  Progression:  Unchanged  Chronicity:  New  Context:  Patient is here for 3rd rabies series  Associated symptoms: no abdominal pain, no chest pain, no congestion, no cough, no fever, no headaches, no rash, no vomiting and no wheezing        Prior to Admission Medications   Prescriptions Last Dose Informant Patient Reported? Taking? Methylprednisolone 4 MG TBPK   No No   Sig: Use as directed on package   sildenafil (VIAGRA) 100 mg tablet   Yes No   Sig: Take 1 tablet by mouth      Facility-Administered Medications: None       History reviewed  No pertinent past medical history  History reviewed  No pertinent surgical history  History reviewed  No pertinent family history  I have reviewed and agree with the history as documented  Social History   Substance Use Topics    Smoking status: Never Smoker    Smokeless tobacco: Never Used    Alcohol use No        Review of Systems   Constitutional: Negative for activity change, appetite change and fever  HENT: Negative for congestion and facial swelling  Eyes: Negative for discharge and redness  Respiratory: Negative for cough and wheezing  Cardiovascular: Negative for chest pain and leg swelling  Gastrointestinal: Negative for abdominal distention, abdominal pain, blood in stool and vomiting  Endocrine: Negative for cold intolerance and polydipsia     Genitourinary: Negative for difficulty urinating and hematuria  Musculoskeletal: Negative for arthralgias and gait problem  Skin: Negative for color change and rash  Allergic/Immunologic: Negative for food allergies and immunocompromised state  Neurological: Negative for dizziness, seizures and headaches  Hematological: Negative for adenopathy  Does not bruise/bleed easily  Psychiatric/Behavioral: Negative for agitation, confusion and decreased concentration  All other systems reviewed and are negative  Physical Exam  ED Triage Vitals [02/27/18 2221]   Temp Pulse Respirations Blood Pressure SpO2   -- 73 14 142/89 97 %      Temp src Heart Rate Source Patient Position - Orthostatic VS BP Location FiO2 (%)   -- Monitor Sitting Right arm --      Pain Score       No Pain           Orthostatic Vital Signs  Vitals:    02/27/18 2221   BP: 142/89   Pulse: 73   Patient Position - Orthostatic VS: Sitting       Physical Exam   Constitutional: He is oriented to person, place, and time  He appears well-developed and well-nourished  Non-toxic appearance  HENT:   Head: Normocephalic and atraumatic  Right Ear: Tympanic membrane normal    Left Ear: Tympanic membrane normal    Nose: Nose normal    Mouth/Throat: Oropharynx is clear and moist    Eyes: Conjunctivae, EOM and lids are normal  Pupils are equal, round, and reactive to light  Neck: Trachea normal and normal range of motion  Neck supple  No JVD present  Carotid bruit is not present  Cardiovascular: Normal rate, regular rhythm, normal heart sounds and intact distal pulses  No extrasystoles are present  Pulmonary/Chest: Effort normal  He has no decreased breath sounds  He has no wheezes  He has no rhonchi  He has no rales  He exhibits no tenderness and no deformity  Abdominal: Soft  Bowel sounds are normal  There is no tenderness  There is no rebound, no guarding and no CVA tenderness     Musculoskeletal:        Right shoulder: He exhibits normal range of motion, no tenderness, no swelling and no deformity  Cervical back: Normal  He exhibits normal range of motion, no tenderness, no bony tenderness and no deformity  Lymphadenopathy:     He has no cervical adenopathy  He has no axillary adenopathy  Neurological: He is alert and oriented to person, place, and time  He has normal strength and normal reflexes  No cranial nerve deficit or sensory deficit  He displays a negative Romberg sign  Skin: Skin is warm and dry  Psychiatric: He has a normal mood and affect  His speech is normal and behavior is normal  Judgment and thought content normal  Cognition and memory are normal    Nursing note and vitals reviewed  ED Medications  Medications   rabies vaccine, chick embryo (RABAVERT) IM injection 1 mL (1 mL Intramuscular Given 2/27/18 2229)       Diagnostic Studies  Results Reviewed     None                 No orders to display              Procedures  Procedures       Phone Contacts  ED Phone Contact    ED Course  ED Course                                MDM  Number of Diagnoses or Management Options  Encounter for repeat administration of rabies vaccination: minor  Patient Progress  Patient progress: stable    CritCare Time    Disposition  Final diagnoses:   Encounter for repeat administration of rabies vaccination     Time reflects when diagnosis was documented in both MDM as applicable and the Disposition within this note     Time User Action Codes Description Comment    2/27/2018 10:21 PM Deandre Bell [facility] Encounter for repeat administration of rabies vaccination       ED Disposition     ED Disposition Condition Comment    Discharge  [facility] discharge to home/self care      Condition at discharge: Good        Follow-up Information     Follow up With Specialties Details Why Contact Info Additional [address] Emergency Department Emergency Medicine In 1 week  [address] [phone]  ED, Counts include 234 beds at the [facility] [address], [place] Mcd[place], [address], [address]        Discharge Medication List as of 2/27/2018 10:23 PM      CONTINUE these medications which have NOT CHANGED    Details   Methylprednisolone 4 MG TBPK Use as directed on package, Normal      sildenafil (VIAGRA) 100 mg tablet Take 1 tablet by mouth, Starting Tue 10/3/2017, Historical Med           No discharge procedures on file      ED Provider  Electronically Signed by           Dalia Mcnally DO  02/27/18 4756

## 2018-02-28 NOTE — DISCHARGE INSTRUCTIONS
Rabies Vaccine   WHAT YOU NEED TO KNOW:   The rabies vaccine is an injection given to help prevent a rabies virus infection  The virus is spread to humans through the bite of an infected animal  Dogs, bats, skunks, coyotes, raccoons, and foxes are examples of animals that can carry rabies  The rabies vaccine can protect you from being infected with the virus  The vaccine can also prevent you from developing rabies even if you get it after you were bitten by an animal    DISCHARGE INSTRUCTIONS:   Call 911 for any of the following:   · Your mouth and throat are swollen  · You are wheezing or have trouble breathing  · You have chest pain or your heart is beating faster than normal for you  · You feel like you are going to faint  Return to the emergency department if:   · Your face is red or swollen  · You have hives that spread over your body  · You feel weak or dizzy  Contact your healthcare provider if:   · You have increased pain, redness, or swelling around the area where the shot was given  · You have questions or concerns about the rabies vaccine  Apply a warm compress  to the injection area as directed to decrease pain and swelling  Follow up with your healthcare provider as directed:  Write down your questions so you remember to ask them during your visits  © 2017 [address] [facility] Information is for End User's use only and may not be sold, redistributed or otherwise used for commercial purposes  All illustrations and images included in CareNotes® are the copyrighted property of [employer]  or Domenic rAmas  The above information is an  only  It is not intended as medical advice for individual conditions or treatments  Talk to your doctor, nurse or pharmacist before following any medical regimen to see if it is safe and effective for you

## 2018-03-06 ENCOUNTER — HOSPITAL ENCOUNTER (EMERGENCY)
Facility: HOSPITAL | Age: 58
Discharge: HOME | End: 2018-03-06
Attending: EMERGENCY MEDICINE | Admitting: EMERGENCY MEDICINE
Payer: COMMERCIAL

## 2018-03-06 VITALS
SYSTOLIC BLOOD PRESSURE: 136 MMHG | DIASTOLIC BLOOD PRESSURE: 72 MMHG | RESPIRATION RATE: 16 BRPM | OXYGEN SATURATION: 99 % | HEART RATE: 76 BPM | TEMPERATURE: 98.4 F

## 2018-03-06 DIAGNOSIS — Z23 ENCOUNTER FOR REPEAT ADMINISTRATION OF RABIES VACCINATION: Primary | ICD-10-CM

## 2018-03-06 PROCEDURE — 90471 IMMUNIZATION ADMIN: CPT

## 2018-03-06 PROCEDURE — 99282 EMERGENCY DEPT VISIT SF MDM: CPT

## 2018-03-06 PROCEDURE — 90675 RABIES VACCINE IM: CPT | Performed by: PHYSICIAN ASSISTANT

## 2018-03-06 RX ADMIN — RABIES VACCINE 1 ML: KIT at 22:21

## 2018-03-07 NOTE — ED PROVIDER NOTES
History  Chief Complaint   Patient presents with    Follow Up Rabies     Pt here for last series of rabies     The patient is a 29-year-old male who returns to the ED for final rabies immunization of the series  He denies any acute reactions to the previous injections  He offers no complaints at this time  History provided by:  Patient   used: No        Prior to Admission Medications   Prescriptions Last Dose Informant Patient Reported? Taking? Methylprednisolone 4 MG TBPK   No No   Sig: Use as directed on package   sildenafil (VIAGRA) 100 mg tablet   Yes No   Sig: Take 1 tablet by mouth      Facility-Administered Medications: None       History reviewed  No pertinent past medical history  History reviewed  No pertinent surgical history  History reviewed  No pertinent family history  I have reviewed and agree with the history as documented  Social History   Substance Use Topics    Smoking status: Never Smoker    Smokeless tobacco: Never Used    Alcohol use No        Review of Systems   Constitutional: Negative for appetite change, chills, diaphoresis, fatigue and fever  HENT: Negative  Eyes: Negative for photophobia and visual disturbance  Respiratory: Negative for cough, chest tightness, shortness of breath and wheezing  Gastrointestinal: Negative for nausea and vomiting  Genitourinary: Negative  Musculoskeletal: Negative for myalgias and neck stiffness  Skin: Negative for color change, pallor and rash  Neurological: Negative for dizziness, seizures, weakness, light-headedness, numbness and headaches  Psychiatric/Behavioral: Negative for confusion         Physical Exam  ED Triage Vitals [03/06/18 2119]   Temperature Pulse Respirations Blood Pressure SpO2   98 4 °F (36 9 °C) 76 16 136/72 99 %      Temp Source Heart Rate Source Patient Position - Orthostatic VS BP Location FiO2 (%)   Oral Monitor Sitting Left arm --      Pain Score       No Pain Orthostatic Vital Signs  Vitals:    03/06/18 2119   BP: 136/72   Pulse: 76   Patient Position - Orthostatic VS: Sitting       Physical Exam   Constitutional: He is oriented to person, place, and time  He appears well-developed and well-nourished  No distress  HENT:   Head: Normocephalic and atraumatic  Right Ear: External ear normal    Left Ear: External ear normal    Nose: Nose normal    Neck: Normal range of motion  Neck supple  Cardiovascular: Normal rate, regular rhythm, normal heart sounds and intact distal pulses  Exam reveals no friction rub  No murmur heard  Pulmonary/Chest: Effort normal and breath sounds normal  No respiratory distress  He has no wheezes  Lymphadenopathy:     He has no cervical adenopathy  Neurological: He is alert and oriented to person, place, and time  He exhibits normal muscle tone  Coordination normal    Skin: Skin is warm and dry  Capillary refill takes less than 2 seconds  No rash noted  He is not diaphoretic  No pallor  Nursing note and vitals reviewed  ED Medications  Medications   rabies vaccine, chick embryo (RABAVERT) IM injection 1 mL (1 mL Intramuscular Given 3/6/18 2221)       Diagnostic Studies  Results Reviewed     None                 No orders to display              Procedures  Procedures       Phone Contacts  ED Phone Contact    ED Course  ED Course                                MDM  Number of Diagnoses or Management Options  Encounter for repeat administration of rabies vaccination: minor  Diagnosis management comments: The patient received his final rabies immunization injection of the series without complication  He was discharged in no acute distress with instructions to return to the ED if any worsening symptoms develop, or to follow up with his PCP as necessary         Amount and/or Complexity of Data Reviewed  Review and summarize past medical records: yes      CritCare Time    Disposition  Final diagnoses:   Encounter for repeat administration of rabies vaccination     Time reflects when diagnosis was documented in both MDM as applicable and the Disposition within this note     Time User Action Codes Description Comment    3/6/2018 10:20 PM Emilee Elias Add [Z23] Encounter for repeat administration of rabies vaccination       ED Disposition     ED Disposition Condition Comment    Discharge  Lizzie Fear discharge to home/self care  Condition at discharge: Stable        Follow-up Information     Follow up With Specialties Details Why Contact Info Additional [address] Emergency Department Emergency Medicine Go to If symptoms worsen [address] [address] 930 [address] ED, [place], [place], [address]    Sandra Nath MD Family Medicine Go to As needed [address] [address]  [phone]           Discharge Medication List as of 3/6/2018 10:21 PM      CONTINUE these medications which have NOT CHANGED    Details   Methylprednisolone 4 MG TBPK Use as directed on package, Normal      sildenafil (VIAGRA) 100 mg tablet Take 1 tablet by mouth, Starting Tue 10/3/2017, Historical Med           No discharge procedures on file      ED Provider  Electronically Signed by           Antonio Sosa PA-C  03/06/18 1407

## 2018-03-07 NOTE — ED NOTES
PT awake and alert, no distress noted, no other questions upon d/c     April M Kellie Bustamante, RN  03/06/18 1497

## 2018-07-02 ENCOUNTER — OFFICE VISIT (OUTPATIENT)
Dept: FAMILY MEDICINE CLINIC | Facility: CLINIC | Age: 58
End: 2018-07-02
Payer: COMMERCIAL

## 2018-07-02 VITALS
DIASTOLIC BLOOD PRESSURE: 78 MMHG | WEIGHT: 230 LBS | BODY MASS INDEX: 29.53 KG/M2 | SYSTOLIC BLOOD PRESSURE: 120 MMHG | TEMPERATURE: 97.9 F

## 2018-07-02 DIAGNOSIS — J18.9 COMMUNITY ACQUIRED PNEUMONIA OF LEFT LOWER LOBE OF LUNG: ICD-10-CM

## 2018-07-02 DIAGNOSIS — J20.9 BRONCHOSPASM WITH BRONCHITIS, ACUTE: Primary | ICD-10-CM

## 2018-07-02 PROBLEM — J11.1 INFLUENZA: Status: RESOLVED | Noted: 2018-01-31 | Resolved: 2018-07-02

## 2018-07-02 PROCEDURE — 99213 OFFICE O/P EST LOW 20 MIN: CPT | Performed by: FAMILY MEDICINE

## 2018-07-02 RX ORDER — AZITHROMYCIN 250 MG/1
TABLET, FILM COATED ORAL
Qty: 6 TABLET | Refills: 0 | Status: SHIPPED | OUTPATIENT
Start: 2018-07-02 | End: 2018-07-06

## 2018-07-02 RX ORDER — METHYLPREDNISOLONE 4 MG/1
TABLET ORAL
Qty: 21 TABLET | Refills: 0 | Status: SHIPPED | OUTPATIENT
Start: 2018-07-02 | End: 2018-07-17 | Stop reason: ALTCHOICE

## 2018-07-02 RX ORDER — DEXTROMETHORPHAN HYDROBROMIDE AND PROMETHAZINE HYDROCHLORIDE 15; 6.25 MG/5ML; MG/5ML
SYRUP ORAL 4 TIMES DAILY PRN
COMMUNITY
End: 2018-07-17 | Stop reason: ALTCHOICE

## 2018-07-02 NOTE — PROGRESS NOTES
Assessment/Plan:  Bronchospasm with bronchitis, acute  Patient appears to be suffered from an asthmatic bronchitis  He is going to continue with Ventolin p r n  as well as promethazine DM p r n  He will push fluids and rest   We are going to add a Medrol Dosepak  If his condition does not continue to significantly improve over the next 48 hours he will begin azithromycin  He is asked to call in 2-3 days with report of his condition or sooner as needed  This may represent underlying asthma though I am unwilling to make this diagnosis today  Diagnoses and all orders for this visit:    Bronchospasm with bronchitis, acute  -     azithromycin (ZITHROMAX) 250 mg tablet; 2 stat and then 1 QD    Community acquired pneumonia of left lower lobe of lung (HCC)  -     Methylprednisolone 4 MG TBPK; Use as directed on package    Other orders  -     VENTOLIN  (90 Base) MCG/ACT inhaler;   -     promethazine-dextromethorphan (PHENERGAN-DM) 6 25-15 mg/5 mL oral syrup; Take by mouth 4 (four) times a day as needed for cough          Subjective:   Chief Complaint   Patient presents with    Cough    URI        Patient ID: Priscilla Mueller is a 66 y o  male  Yellow sputum from chest which was worse last week but seems to be improving this am  Raspy cough  OB  Felt feverish  Had Has which improved  No otalgia but little nasal d/c  Was at a convention with multiple people with resp  Illness  HPI  Patient is a 62-year-old male who presents today with recurrent respiratory syndrome  He had similar episodes once or twice over the winter  He states that this typically happens to him after attending, convention  Typically works from home and is a exposed to essentially no one but his wife  Every few weeks he attends a convention where he interacts with 100s of people and seems to become ill after these events    Approximately 2 weeks ago he attended a convention and over the past several days has developed a raspy cough with yellow sputum  It seemed to be worsening last week but somewhat improved this morning  We did give him an albuterol inhaler over the phone last Friday which she has been using and seems to be helping  He has had low-grade fevers feeling  He has had no significant nasal discharge postnasal drip otalgia X cetera  He has had some minimal headaches and no myalgias  He has no shortness of breath  He does note wheezing in the morning hours  He has no chest pain edema PND orthopnea X cetera  The following portions of the patient's history were reviewed and updated as appropriate: allergies, current medications, past medical history, past social history and problem list     ROS    Limited review of systems as per HPI  Objective:    Physical Exam   Constitutional: He is oriented to person, place, and time  He appears well-developed and well-nourished  No distress  Overweight   HENT:   Mouth/Throat: Oropharynx is clear and moist  No oropharyngeal exudate  Eyes: Conjunctivae are normal  No scleral icterus  Neck: No JVD present  Cardiovascular: Normal rate, regular rhythm and normal heart sounds  No murmur heard  Pulmonary/Chest: Effort normal  No respiratory distress  He has wheezes  He does have a few scattered rhonchi and some and expiratory wheezing throughout  No crackles noted  Musculoskeletal: He exhibits no edema  Lymphadenopathy:     He has no cervical adenopathy  Neurological: He is alert and oriented to person, place, and time  Skin: No erythema  Psychiatric: He has a normal mood and affect   Thought content normal

## 2018-07-02 NOTE — PATIENT INSTRUCTIONS
Please call in 3-5 days if your symptoms have not significantly improved  Call sooner or seek more urgent medical attention as needed

## 2018-07-02 NOTE — ASSESSMENT & PLAN NOTE
Patient appears to be suffered from an asthmatic bronchitis  He is going to continue with Ventolin p r n  as well as promethazine DM p r n  He will push fluids and rest   We are going to add a Medrol Dosepak  If his condition does not continue to significantly improve over the next 48 hours he will begin azithromycin  He is asked to call in 2-3 days with report of his condition or sooner as needed  This may represent underlying asthma though I am unwilling to make this diagnosis today

## 2018-07-17 ENCOUNTER — OFFICE VISIT (OUTPATIENT)
Dept: FAMILY MEDICINE CLINIC | Facility: CLINIC | Age: 58
End: 2018-07-17
Payer: COMMERCIAL

## 2018-07-17 VITALS
BODY MASS INDEX: 29.4 KG/M2 | WEIGHT: 229 LBS | HEART RATE: 75 BPM | OXYGEN SATURATION: 97 % | DIASTOLIC BLOOD PRESSURE: 90 MMHG | TEMPERATURE: 96.9 F | SYSTOLIC BLOOD PRESSURE: 128 MMHG

## 2018-07-17 DIAGNOSIS — J20.9 BRONCHOSPASM WITH BRONCHITIS, ACUTE: Primary | ICD-10-CM

## 2018-07-17 PROCEDURE — 99214 OFFICE O/P EST MOD 30 MIN: CPT | Performed by: FAMILY MEDICINE

## 2018-07-17 RX ORDER — FLUTICASONE PROPIONATE 110 UG/1
2 AEROSOL, METERED RESPIRATORY (INHALATION) 2 TIMES DAILY
Qty: 1 INHALER | Refills: 2 | Status: SHIPPED | OUTPATIENT
Start: 2018-07-17 | End: 2018-11-27 | Stop reason: SDUPTHER

## 2018-07-17 RX ORDER — PREDNISONE 10 MG/1
10 TABLET ORAL DAILY
Qty: 22 TABLET | Refills: 0 | Status: SHIPPED | OUTPATIENT
Start: 2018-07-17 | End: 2018-11-27 | Stop reason: ALTCHOICE

## 2018-07-17 NOTE — ASSESSMENT & PLAN NOTE
Patient has persistent bronchospastic cough  This may represent underlying asthma given his history of similar events over the past couple of years  We are going to give him prednisone taper, start Flovent and have him use Ventolin as needed  He is asked to call with report of the effectiveness over the next 2 weeks or so  Will call sooner should he develop purulent sputum increasing shortness of breath X cetera  Should his symptoms resolved he is going to continue the Flovent for approximately 2 months and then wean it  If his symptoms return he is going to resume Flovent and call for further evaluation  We did discuss possible pulmonary function testing as well  He agrees

## 2018-07-17 NOTE — PROGRESS NOTES
Assessment/Plan:  Bronchospasm with bronchitis, acute  Patient has persistent bronchospastic cough  This may represent underlying asthma given his history of similar events over the past couple of years  We are going to give him prednisone taper, start Flovent and have him use Ventolin as needed  He is asked to call with report of the effectiveness over the next 2 weeks or so  Will call sooner should he develop purulent sputum increasing shortness of breath X cetera  Should his symptoms resolved he is going to continue the Flovent for approximately 2 months and then wean it  If his symptoms return he is going to resume Flovent and call for further evaluation  We did discuss possible pulmonary function testing as well  He agrees  Diagnoses and all orders for this visit:    Bronchospasm with bronchitis, acute  -     predniSONE 10 mg tablet; Take 1 tablet (10 mg total) by mouth daily 4 daily for 2 days then 3 daily for 2 days then 2 daily for 2 days then 1 daily for 4 days  -     fluticasone (FLOVENT HFA) 110 MCG/ACT inhaler; Inhale 2 puffs 2 (two) times a day Rinse mouth after use  Subjective:   Chief Complaint   Patient presents with    Cough     pt states his cough is dry  he does get fatigue often  states he did finish all his anitibiotics and prednisone, but is still using the inhaler  Patient ID: Gail More is a 66 y o  male  I have a persistent cough that gets better and worse  It wont go away  A little phlegm but generally non productive  No nocturnal worsening  Inhaler seems to help but not resolve it  Improved on steroids  Worsened after the steroids had been completed for several days  No fever  No CP but mild dyspnea  No edema  No nasal d/c  No history or RAD  HPI  Patient is a 82-year-old male whose had a persistent cough  He was seen on July 2nd, 2 weeks ago and was felt to represent asthmatic bronchitis  He was given p r n   Ventolin as well as promethazine and a Medrol Dosepak  He was also given azithromycin to begin if his condition did not seem to improve  Three days into the steroid pack he also began azithromycin  He feels that things were relatively clear until about 2-3 days after finishing the Medrol Dosepak  He began to re-experience tight mostly nonproductive cough  No chest pain or wheezing  He did have mild shortness of breath  No PND orthopnea  No edema  No fever  No significant sputum  He has no history of asthma reactive airway disease  His wife did have a mild respiratory condition around the time that he began it but it resolved  The following portions of the patient's history were reviewed and updated as appropriate: allergies, current medications, past family history, past medical history, past social history, past surgical history and problem list     Review of Systems   Cardiovascular: Negative for chest pain, dyspnea on exertion, irregular heartbeat, leg swelling, orthopnea and paroxysmal nocturnal dyspnea  Respiratory: Positive for cough and shortness of breath  Negative for hemoptysis, sputum production and wheezing  Skin: Negative for rash  Gastrointestinal: Negative for heartburn  Objective:    Physical Exam   Constitutional: He is oriented to person, place, and time  He appears well-developed and well-nourished  No distress  HENT:   Mouth/Throat: Oropharynx is clear and moist  No oropharyngeal exudate  TMs are normal, nasal mucosa is boggy with watery discharge  Eyes: Conjunctivae are normal  No scleral icterus  Neck: Normal range of motion  Neck supple  No JVD present  No thyromegaly present  Cardiovascular: Normal rate, regular rhythm and normal heart sounds  Exam reveals no gallop  No murmur heard  Pulmonary/Chest: Effort normal  No respiratory distress  He has wheezes  He has no rales  There is mild to moderate expiratory delay  There is some forced end-expiratory wheeze    No crackle or rhonchi noted   Musculoskeletal: He exhibits no edema  Lymphadenopathy:     He has no cervical adenopathy  Neurological: He is alert and oriented to person, place, and time  Skin: No rash noted  No erythema  Psychiatric: He has a normal mood and affect   Thought content normal

## 2018-11-27 ENCOUNTER — OFFICE VISIT (OUTPATIENT)
Dept: FAMILY MEDICINE CLINIC | Facility: CLINIC | Age: 58
End: 2018-11-27
Payer: COMMERCIAL

## 2018-11-27 VITALS
DIASTOLIC BLOOD PRESSURE: 80 MMHG | OXYGEN SATURATION: 97 % | SYSTOLIC BLOOD PRESSURE: 130 MMHG | TEMPERATURE: 96.9 F | BODY MASS INDEX: 29.15 KG/M2 | HEART RATE: 70 BPM | WEIGHT: 227 LBS

## 2018-11-27 DIAGNOSIS — J20.9 BRONCHOSPASM WITH BRONCHITIS, ACUTE: Primary | ICD-10-CM

## 2018-11-27 PROCEDURE — 1036F TOBACCO NON-USER: CPT | Performed by: FAMILY MEDICINE

## 2018-11-27 PROCEDURE — 99213 OFFICE O/P EST LOW 20 MIN: CPT | Performed by: FAMILY MEDICINE

## 2018-11-27 RX ORDER — AZITHROMYCIN 250 MG/1
TABLET, FILM COATED ORAL
Qty: 6 TABLET | Refills: 0 | Status: SHIPPED | OUTPATIENT
Start: 2018-11-27 | End: 2018-12-01

## 2018-11-27 RX ORDER — FLUTICASONE PROPIONATE 110 UG/1
2 AEROSOL, METERED RESPIRATORY (INHALATION) 2 TIMES DAILY
Qty: 1 INHALER | Refills: 0 | Status: SHIPPED | OUTPATIENT
Start: 2018-11-27 | End: 2020-12-04 | Stop reason: SDUPTHER

## 2018-11-27 RX ORDER — DEXTROMETHORPHAN HYDROBROMIDE AND PROMETHAZINE HYDROCHLORIDE 15; 6.25 MG/5ML; MG/5ML
5 SYRUP ORAL 4 TIMES DAILY PRN
Qty: 118 ML | Refills: 0 | Status: SHIPPED | OUTPATIENT
Start: 2018-11-27 | End: 2019-04-12 | Stop reason: ALTCHOICE

## 2018-11-27 NOTE — ASSESSMENT & PLAN NOTE
The patient is a 28-year-old male who presents today with acute sinobronchial syndrome  We are going to have him use promethazine DM, refill his Ventolin inhaler and have him push fluids and rest   If his symptoms persist for the next 2-3 days or if any point they should worsen he is going to start azithromycin  He will call as needed  He agrees

## 2018-11-27 NOTE — PROGRESS NOTES
Assessment/Plan:  Bronchospasm with bronchitis, acute  The patient is a 61-year-old male who presents today with acute sinobronchial syndrome  We are going to have him use promethazine DM, refill his Ventolin inhaler and have him push fluids and rest   If his symptoms persist for the next 2-3 days or if any point they should worsen he is going to start azithromycin  He will call as needed  He agrees  Diagnoses and all orders for this visit:    Bronchospasm with bronchitis, acute  -     promethazine-dextromethorphan (PHENERGAN-DM) 6 25-15 mg/5 mL oral syrup; Take 5 mL by mouth 4 (four) times a day as needed for cough  -     azithromycin (ZITHROMAX) 250 mg tablet; 2 stat and then 1 QD  -     VENTOLIN  (90 Base) MCG/ACT inhaler; Inhale 2 puffs every 4 (four) hours as needed for wheezing  -     fluticasone (FLOVENT HFA) 110 MCG/ACT inhaler; Inhale 2 puffs 2 (two) times a day Rinse mouth after use  -     VENTOLIN  (90 Base) MCG/ACT inhaler; Inhale 2 puffs every 4 (four) hours as needed for wheezing          Subjective:   Chief Complaint   Patient presents with    Sore Throat     scratchy, cough that is productive and colored, sinus congestion x 1 week  pt will schedule for fbw  Began 10 days ago with a persistent cough that has become productive of yellow phlegm  No hemoptysis and no fever  Sinuses congested  No CP  No otalgia but mild ST  Has nocturnal sx  Patient ID: Hailey Sidhu is a 66 y o  male  HPI  Patient is a 61-year-old male who presents today with a complaint of 10 days of persistent cough  Recently it has become productive of yellow sputum  He has had no hemoptysis no fever  His sinuses feel congested  He has had no chest pain  Had no otalgia  He has had some mild should wheezing and sore throat  Does have some nocturnal symptoms This is become a recurrent event for him over the past several months    The following portions of the patient's history were reviewed and updated as appropriate: allergies, current medications, past family history, past medical history, past social history, past surgical history and problem list     Review of Systems   Constitution: Negative for chills, fever and malaise/fatigue  Cardiovascular: Negative for chest pain, irregular heartbeat, leg swelling, orthopnea and paroxysmal nocturnal dyspnea  Respiratory: Positive for cough, shortness of breath and wheezing  Negative for hemoptysis and sputum production  Skin: Negative for rash  Neurological: Negative for dizziness and headaches  Objective:    Physical Exam   Constitutional: He is oriented to person, place, and time  He appears well-developed and well-nourished  No distress  HENT:   Mouth/Throat: Oropharynx is clear and moist  No oropharyngeal exudate  Nasal erythema mucoid nasal discharge  No paranasal sinus tenderness  TMs appear normal    Eyes: Conjunctivae are normal  Right eye exhibits no discharge  Left eye exhibits no discharge  Neck: Neck supple  No JVD present  No thyromegaly present  Cardiovascular: Normal rate, regular rhythm and normal heart sounds  Exam reveals no gallop  No murmur heard  Pulmonary/Chest: Effort normal and breath sounds normal  No respiratory distress  He has no wheezes  He has no rales  Musculoskeletal: He exhibits no edema  Lymphadenopathy:     He has no cervical adenopathy  Neurological: He is alert and oriented to person, place, and time  Skin: No rash noted  No erythema  Psychiatric: He has a normal mood and affect  Nursing note and vitals reviewed

## 2019-04-12 ENCOUNTER — OFFICE VISIT (OUTPATIENT)
Dept: FAMILY MEDICINE CLINIC | Facility: CLINIC | Age: 59
End: 2019-04-12
Payer: COMMERCIAL

## 2019-04-12 VITALS
TEMPERATURE: 98.7 F | DIASTOLIC BLOOD PRESSURE: 90 MMHG | OXYGEN SATURATION: 95 % | WEIGHT: 230 LBS | SYSTOLIC BLOOD PRESSURE: 130 MMHG | BODY MASS INDEX: 29.53 KG/M2 | HEART RATE: 80 BPM

## 2019-04-12 DIAGNOSIS — J20.9 ACUTE BRONCHITIS, UNSPECIFIED ORGANISM: Primary | ICD-10-CM

## 2019-04-12 PROCEDURE — 1036F TOBACCO NON-USER: CPT | Performed by: FAMILY MEDICINE

## 2019-04-12 PROCEDURE — 99213 OFFICE O/P EST LOW 20 MIN: CPT | Performed by: FAMILY MEDICINE

## 2019-04-12 RX ORDER — BENZONATATE 200 MG/1
200 CAPSULE ORAL 3 TIMES DAILY PRN
Qty: 30 CAPSULE | Refills: 1 | Status: SHIPPED | OUTPATIENT
Start: 2019-04-12 | End: 2020-01-20 | Stop reason: ALTCHOICE

## 2019-04-12 RX ORDER — PREDNISONE 10 MG/1
10 TABLET ORAL DAILY
Qty: 22 TABLET | Refills: 0 | Status: SHIPPED | OUTPATIENT
Start: 2019-04-12 | End: 2020-01-20 | Stop reason: ALTCHOICE

## 2019-04-25 DIAGNOSIS — J32.9 SINUSITIS, UNSPECIFIED CHRONICITY, UNSPECIFIED LOCATION: Primary | ICD-10-CM

## 2019-04-25 RX ORDER — DOXYCYCLINE HYCLATE 100 MG/1
100 CAPSULE ORAL EVERY 12 HOURS SCHEDULED
Qty: 20 CAPSULE | Refills: 0 | Status: SHIPPED | OUTPATIENT
Start: 2019-04-25 | End: 2019-05-05

## 2019-10-17 ENCOUNTER — IMMUNIZATIONS (OUTPATIENT)
Dept: FAMILY MEDICINE CLINIC | Facility: CLINIC | Age: 59
End: 2019-10-17
Payer: COMMERCIAL

## 2019-10-17 DIAGNOSIS — Z23 ENCOUNTER FOR IMMUNIZATION: ICD-10-CM

## 2019-10-17 PROCEDURE — 90471 IMMUNIZATION ADMIN: CPT

## 2019-10-17 PROCEDURE — 90682 RIV4 VACC RECOMBINANT DNA IM: CPT

## 2020-01-20 ENCOUNTER — OFFICE VISIT (OUTPATIENT)
Dept: FAMILY MEDICINE CLINIC | Facility: CLINIC | Age: 60
End: 2020-01-20
Payer: COMMERCIAL

## 2020-01-20 VITALS
WEIGHT: 222 LBS | HEART RATE: 73 BPM | TEMPERATURE: 96.2 F | HEIGHT: 74 IN | DIASTOLIC BLOOD PRESSURE: 84 MMHG | SYSTOLIC BLOOD PRESSURE: 128 MMHG | BODY MASS INDEX: 28.49 KG/M2

## 2020-01-20 DIAGNOSIS — H61.22 IMPACTED CERUMEN OF LEFT EAR: Primary | ICD-10-CM

## 2020-01-20 PROBLEM — J20.9 BRONCHOSPASM WITH BRONCHITIS, ACUTE: Status: RESOLVED | Noted: 2018-07-02 | Resolved: 2020-01-20

## 2020-01-20 PROCEDURE — 69210 REMOVE IMPACTED EAR WAX UNI: CPT | Performed by: FAMILY MEDICINE

## 2020-01-20 NOTE — ASSESSMENT & PLAN NOTE
The patient had cerumen impaction AS with hearing loss  This was remedied with a combination of plastic curette and irrigation with half water and half peroxide  Patient tolerated procedure well and was happy with the results  Will see him back as needed  He agrees

## 2020-01-20 NOTE — PROGRESS NOTES
Assessment/Plan:  Impacted cerumen of left ear  The patient had cerumen impaction AS with hearing loss  This was remedied with a combination of plastic curette and irrigation with half water and half peroxide  Patient tolerated procedure well and was happy with the results  Will see him back as needed  He agrees  Diagnoses and all orders for this visit:    Impacted cerumen of left ear          Subjective:   Chief Complaint   Patient presents with    Cerumen Impaction     L ear clogged, used debrox     About 2 weeks  Tried Debrox for for days BID and 1 week QD  Started after cleaning ears after a shower  No wax noted since beginning  Mild otalgia  No URI sx  Patient ID: Zachary Bowles is a 64 y o  male  HPI  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 for the past 2 weeks he has had a feeling of congestion and hearing loss in his left ear  It began acutely after shower trying to use a Q-tip  He has had a vague feeling of otalgia  He has had no drainage  He has seen no significant earwax  He has no tinnitus or vertigo  He has no significant respiratory symptoms  The following portions of the patient's history were reviewed and updated as appropriate: allergies, current medications, past medical history, past social history and problem list     ROS    Limited pertinent review of systems is as noted in the HPI  Objective:    Physical Exam   Constitutional: He is oriented to person, place, and time  He appears well-developed and well-nourished  Overweight   HENT:   He has some minimal cerumen right ear  He has cerumen obstruction AS  This is removed with moderate difficulty with a combination of plastic curette and irrigation with half peroxide and half water  Ultimately the cerumen impaction was cleared and he was very happy with the results  He tolerated without any complaint  Neurological: He is alert and oriented to person, place, and time     Psychiatric: He has a normal mood and affect   Thought content normal

## 2020-11-27 ENCOUNTER — TELEMEDICINE (OUTPATIENT)
Dept: FAMILY MEDICINE CLINIC | Facility: CLINIC | Age: 60
End: 2020-11-27
Payer: COMMERCIAL

## 2020-11-27 VITALS — HEIGHT: 74 IN | TEMPERATURE: 98.7 F | BODY MASS INDEX: 27.59 KG/M2 | WEIGHT: 215 LBS

## 2020-11-27 DIAGNOSIS — B34.9 VIRAL INFECTION, UNSPECIFIED: ICD-10-CM

## 2020-11-27 DIAGNOSIS — Z20.822 EXPOSURE TO COVID-19 VIRUS: ICD-10-CM

## 2020-11-27 PROCEDURE — U0003 INFECTIOUS AGENT DETECTION BY NUCLEIC ACID (DNA OR RNA); SEVERE ACUTE RESPIRATORY SYNDROME CORONAVIRUS 2 (SARS-COV-2) (CORONAVIRUS DISEASE [COVID-19]), AMPLIFIED PROBE TECHNIQUE, MAKING USE OF HIGH THROUGHPUT TECHNOLOGIES AS DESCRIBED BY CMS-2020-01-R: HCPCS | Performed by: FAMILY MEDICINE

## 2020-11-27 PROCEDURE — 99214 OFFICE O/P EST MOD 30 MIN: CPT | Performed by: FAMILY MEDICINE

## 2020-11-27 PROCEDURE — 3008F BODY MASS INDEX DOCD: CPT | Performed by: FAMILY MEDICINE

## 2020-11-27 PROCEDURE — 1036F TOBACCO NON-USER: CPT | Performed by: FAMILY MEDICINE

## 2020-11-28 LAB — SARS-COV-2 RNA SPEC QL NAA+PROBE: NOT DETECTED

## 2020-12-04 ENCOUNTER — OFFICE VISIT (OUTPATIENT)
Dept: FAMILY MEDICINE CLINIC | Facility: CLINIC | Age: 60
End: 2020-12-04

## 2020-12-04 VITALS
WEIGHT: 223 LBS | DIASTOLIC BLOOD PRESSURE: 82 MMHG | SYSTOLIC BLOOD PRESSURE: 126 MMHG | OXYGEN SATURATION: 97 % | HEART RATE: 79 BPM | BODY MASS INDEX: 28.62 KG/M2 | HEIGHT: 74 IN | TEMPERATURE: 96.6 F

## 2020-12-04 DIAGNOSIS — J20.9 BRONCHOSPASM WITH BRONCHITIS, ACUTE: ICD-10-CM

## 2020-12-04 PROCEDURE — 3008F BODY MASS INDEX DOCD: CPT | Performed by: FAMILY MEDICINE

## 2020-12-04 RX ORDER — FLUTICASONE PROPIONATE 110 UG/1
2 AEROSOL, METERED RESPIRATORY (INHALATION) 2 TIMES DAILY
Qty: 1 INHALER | Refills: 2 | Status: SHIPPED | OUTPATIENT
Start: 2020-12-04 | End: 2022-01-21 | Stop reason: SDUPTHER

## 2020-12-04 RX ORDER — PREDNISONE 10 MG/1
10 TABLET ORAL DAILY
Qty: 22 TABLET | Refills: 0 | Status: SHIPPED | OUTPATIENT
Start: 2020-12-04 | End: 2022-03-03 | Stop reason: ALTCHOICE

## 2020-12-15 DIAGNOSIS — J20.9 BRONCHOSPASM WITH BRONCHITIS, ACUTE: Primary | ICD-10-CM

## 2020-12-15 RX ORDER — AZITHROMYCIN 250 MG/1
TABLET, FILM COATED ORAL
Qty: 6 TABLET | Refills: 0 | Status: SHIPPED | OUTPATIENT
Start: 2020-12-15 | End: 2020-12-19

## 2021-04-09 DIAGNOSIS — Z23 ENCOUNTER FOR IMMUNIZATION: ICD-10-CM

## 2021-05-10 NOTE — DISCHARGE INSTRUCTIONS
[facility]  HEMATOLOGY/ONCOLOGY OFFICE VISIT    CHIEF COMPLAINT:   Cancer (CML (chronic myeloid leukemia))      HISTORY OF PRESENT ILLNESS:  John Mane is a 53 year old male who presents for evaluation of CML.    Last month with some issues with sweats and edema. Resolved spontaneously.    PAST HISTORIES:  Past medical history, surgical history, family history, and social history were reviewed and updated.     Problem List           ICD-10-CM Noted       Oncology Problems    CML (chronic myeloid leukemia) (CMS/HCC) C92.10 12/18/2017    Overview Signed 12/18/2017 12:46 PM by Waldemar Benoit MD     12/2017: BMBx: +bcr/abl, 1% blasts  IS 67.626                 Cancer Staging Summary for John Mane              MEDICATIONS / ALLERGIES:  Medications and allergies were reviewed and updated.    Review of systems:  Constitutional: Negative for fever, chills, change in appetite or fatigue.  Skin: Negative for rash or wounds.   HEENT: Negative for eye drainage, rhinorrhea, ear pain, sore throat or neck pain.  Respiratory: Negative for cough, wheezing or shortness of breath.    Cardiovascular: Negative for chest pain, chest pressure, palpitations or diaphoresis.   Gastrointestinal: Negative for nausea, vomiting, diarrhea, abdominal pain, black or tarry stools.  Genitourinary: Negative for dysuria, urgency, frequency, hematuria or flank pain.  Extremities: Negative for joint swelling or joint pain.  Neurologic: Negative for change in sensory or motor function.  Negative for headache, change in gait, vertigo, vision or speech.  Endocrine: Negative for heat or cold intolerance, weight loss or gain.  Hematological: Negative for bleeding, bruising or lymphadenopathy.  Psychiatric: Negative for change in affect, anxiety, depression, insomnia, mentation or sleep disturbance.    PHYSICAL EXAM:  Vital Signs:   Oncology Encounter Vitals [05/10/21 1600]   ONC OP Encounter Vitals Group      BP (!) 146/88  Diagnosis: rabies exposure     - please return to  [facility] 3 [facility]14 days from today for repeat rabies vaccine      Heart Rate 77      Resp 16      Temp 97.7 °F (36.5 °C)      Temp src Temporal      SpO2 98 %      Weight 226 lb 12.8 oz (102.9 kg)      Height       Pain Score  0      Pain Location       Pain Education?       BSA (Calculated - m2) - [employer]       BMI (Calculated)      ECOG Performance Status:   ECOG [05/10/21 1609]   ECOG Performance Status 0     General: The patient is alert, well-developed, well-nourished, no distress.  Skin: Warm, normal color, normal texture, normal turgor and without rash.  Head: Normocephalic, atraumatic.  Neck: Supple with no significant adenopathy.  Eyes: Normal conjunctivae and sclerae. Pupils equal, round, reactive to light and accommodation. Extraocular movements intact.  ENT: Mucous membranes are moist. Normal nose,mouth and throat.  Cardiovascular: Regular rate and rhythm, no murmurs, gallops or rubs.  Respiratory: Normal respiratory effort. Clear to auscultation. No wheezes, rales, or rhonchi.  Abdomen: Abdomen is soft and non-tender with active bowel sounds. There is no detected hepatosplenomegaly, masses, or ascites.  Extremities: no erythema, edema, warmth.  Lymph: No cervical, supraclavicular, axillary, inguinal lymphadenopathy.  Neurologic: Motor strength normal. Coordination normal. No tremor noted.  Psychiatric: Cooperative. Appropriate mood and affect. Normal judgment.    DATA REVIEWED:  Laboratory Data:  Recent Labs   Lab 05/10/21  1601   WBC 5.1   RBC 3.86*   HGB 12.8*   HCT 38.2*   MCV 99.0      Absolute Neutrophils 2.7   Absolute Lymphocytes 1.7   Absolute Monocytes 0.5   Absolute Eosinophils  0.2   Absolute Basophils 0.0     Recent Labs   Lab 01/11/21  1559   Glucose 94   Sodium 138   Potassium 3.8   Chloride 101   Carbon Dioxide 27   BUN 16   Creatinine 1.12   Calcium 8.8   Protein, Total 7.5   Albumin 4.4   GOT/AST 29   Alkaline Phosphatase 44*   GPT 41     Recent Labs   Lab 01/11/21  1559   Anion Gap 14   Globulin 3.1   LD, Total 233   Bilirubin,  Total 0.5     BCR-abl: LR 4.05, IS 0.009%    Imaging Studies:  CXR, personally viewed: [facility]     ASSESSMENT AND PLAN:  1. CML: Started imatinib 1/2/18.  We will continue to observe for toxicities.  MMR achieved.  2. Pancytopenia:  Improved. Will continue to monitor.     F/u 4 mos    Discussed oral chemo adherence and side effects with patient. Patient is taking medication as prescribed.    The patient indicated understanding of the diagnosis and agreed with the plan of care. The patient is encouraged to call between now and next visit with any problems, questions or concerns that arise.

## 2022-01-21 DIAGNOSIS — J20.9 BRONCHOSPASM WITH BRONCHITIS, ACUTE: ICD-10-CM

## 2022-01-21 RX ORDER — FLUTICASONE PROPIONATE 110 UG/1
2 AEROSOL, METERED RESPIRATORY (INHALATION) 2 TIMES DAILY
Qty: 12 G | Refills: 0 | Status: SHIPPED | OUTPATIENT
Start: 2022-01-21 | End: 2022-03-03 | Stop reason: SDUPTHER

## 2022-01-21 RX ORDER — ALBUTEROL SULFATE 90 UG/1
2 AEROSOL, METERED RESPIRATORY (INHALATION) EVERY 4 HOURS PRN
Qty: 18 G | Refills: 0 | Status: SHIPPED | OUTPATIENT
Start: 2022-01-21 | End: 2022-03-03 | Stop reason: SDUPTHER

## 2022-03-03 ENCOUNTER — OFFICE VISIT (OUTPATIENT)
Dept: FAMILY MEDICINE CLINIC | Facility: CLINIC | Age: 62
End: 2022-03-03
Payer: COMMERCIAL

## 2022-03-03 VITALS
HEIGHT: 75 IN | BODY MASS INDEX: 27.98 KG/M2 | SYSTOLIC BLOOD PRESSURE: 122 MMHG | DIASTOLIC BLOOD PRESSURE: 78 MMHG | WEIGHT: 225 LBS

## 2022-03-03 DIAGNOSIS — Z13.220 SCREENING FOR LIPID DISORDERS: ICD-10-CM

## 2022-03-03 DIAGNOSIS — Z80.42 FAMILY HISTORY OF PROSTATE CARCINOMA: ICD-10-CM

## 2022-03-03 DIAGNOSIS — J20.9 BRONCHOSPASM WITH BRONCHITIS, ACUTE: ICD-10-CM

## 2022-03-03 DIAGNOSIS — J45.20 INTERMITTENT ASTHMA, UNSPECIFIED ASTHMA SEVERITY, UNSPECIFIED WHETHER COMPLICATED: Primary | ICD-10-CM

## 2022-03-03 DIAGNOSIS — Z11.4 SCREENING FOR HIV (HUMAN IMMUNODEFICIENCY VIRUS): ICD-10-CM

## 2022-03-03 DIAGNOSIS — Z12.11 SCREEN FOR COLON CANCER: ICD-10-CM

## 2022-03-03 DIAGNOSIS — Z13.0 SCREENING, ANEMIA, DEFICIENCY, IRON: ICD-10-CM

## 2022-03-03 DIAGNOSIS — Z13.1 SCREENING FOR DIABETES MELLITUS: ICD-10-CM

## 2022-03-03 PROBLEM — Z20.822 EXPOSURE TO COVID-19 VIRUS: Status: RESOLVED | Noted: 2020-11-27 | Resolved: 2022-03-03

## 2022-03-03 PROBLEM — B34.9 VIRAL INFECTION, UNSPECIFIED: Status: RESOLVED | Noted: 2020-11-27 | Resolved: 2022-03-03

## 2022-03-03 PROBLEM — H61.22 IMPACTED CERUMEN OF LEFT EAR: Status: RESOLVED | Noted: 2020-01-20 | Resolved: 2022-03-03

## 2022-03-03 PROCEDURE — 1036F TOBACCO NON-USER: CPT | Performed by: FAMILY MEDICINE

## 2022-03-03 PROCEDURE — 36415 COLL VENOUS BLD VENIPUNCTURE: CPT | Performed by: FAMILY MEDICINE

## 2022-03-03 PROCEDURE — 3008F BODY MASS INDEX DOCD: CPT | Performed by: FAMILY MEDICINE

## 2022-03-03 PROCEDURE — 3725F SCREEN DEPRESSION PERFORMED: CPT | Performed by: FAMILY MEDICINE

## 2022-03-03 PROCEDURE — 99214 OFFICE O/P EST MOD 30 MIN: CPT | Performed by: FAMILY MEDICINE

## 2022-03-03 RX ORDER — ALBUTEROL SULFATE 90 UG/1
2 AEROSOL, METERED RESPIRATORY (INHALATION) EVERY 4 HOURS PRN
Qty: 18 G | Refills: 2 | Status: SHIPPED | OUTPATIENT
Start: 2022-03-03

## 2022-03-03 RX ORDER — FLUTICASONE PROPIONATE 110 UG/1
2 AEROSOL, METERED RESPIRATORY (INHALATION) 2 TIMES DAILY
Qty: 12 G | Refills: 2 | Status: SHIPPED | OUTPATIENT
Start: 2022-03-03

## 2022-03-03 NOTE — PROGRESS NOTES
BMI Counseling: Body mass index is 28 5 kg/m²  The BMI is above normal  Nutrition recommendations include decreasing portion sizes, encouraging healthy choices of fruits and vegetables, consuming healthier snacks, limiting drinks that contain sugar and moderation in carbohydrate intake  Exercise recommendations include moderate physical activity 150 minutes/week and exercising 3-5 times per week  No pharmacotherapy was ordered  Rationale for BMI follow-up plan is due to patient being overweight or obese  Depression Screening and Follow-up Plan: Patient was screened for depression during today's encounter  They screened negative with a PHQ-2 score of 0  Assessment/Plan:  Intermittent asthma  Continue with Flovent and p r n  albuterol  Recheck routinely  Family history of prostate carcinoma  His prostate examination is normal today  Will obtain a PSA after informed discussion  Diagnoses and all orders for this visit:    Intermittent asthma, unspecified asthma severity, unspecified whether complicated    Family history of prostate carcinoma    Screening, anemia, deficiency, iron  -     CBC    Screening for diabetes mellitus  -     Comprehensive metabolic panel    Screening for lipid disorders  -     Lipid panel    Screening for HIV (human immunodeficiency virus)  -     Human Immunodeficiency Virus 1/2 Antigen / Antibody ( Fourth Generation) with Reflex Testing    Screen for colon cancer  -     Cologuard    Bronchospasm with bronchitis, acute  -     fluticasone (FLOVENT HFA) 110 MCG/ACT inhaler; Inhale 2 puffs 2 (two) times a day Rinse mouth after use  -     albuterol (Ventolin HFA) 90 mcg/act inhaler; Inhale 2 puffs every 4 (four) hours as needed for wheezing          Subjective:   Chief Complaint   Patient presents with    Follow-up     Med Check        Patient ID: Reynaldo Burgos is a 63 y o  male  I think I had Covid in January though I did not test myself  Flu like illness  More albuterol use  Fully vaccinated though not booster  Arthralgias for months  Using Flovent daily, no noct awakenings  Alb a lot in J/F now none  No CP / SOB  No constitutional sx  HPI  The patient is a 53-year-old male who presents today for routine follow-up of multiple medical problems including intermittent asthma, allergic rhinitis, family history of prostate carcinoma as well as other  He states that he thinks he had COVID in January  He had a flu-like illness and his wife had similar symptoms  He did not test and feels like the symptoms have resolved  He was using his albuterol more frequently  He is fully vaccinated though he has not yet received his booster  He has been compliant with Flovent daily  He used a lot more rescue inhaler/albuterol in  but he now he is back to his base line of rare use  He has no chest pain or shortness of breath  No fevers chills cough or other constitutional symptoms  He denies dysuria frequency nocturia X cetera  He does note that his father  from metastatic prostate carcinoma in his 76s  The following portions of the patient's history were reviewed and updated as appropriate: allergies, current medications, past family history, past medical history, past social history, past surgical history and problem list     ROS    Per the HPI  Objective:    Physical Exam  Vitals and nursing note reviewed  Constitutional:       Comments: Somewhat overweight in no apparent distress   HENT:      Right Ear: Tympanic membrane normal       Left Ear: Tympanic membrane normal       Nose:      Comments: Boggy nasal turbinates with watery nasal discharge     Mouth/Throat:      Mouth: Mucous membranes are moist       Pharynx: Oropharynx is clear  No oropharyngeal exudate  Eyes:      Conjunctiva/sclera: Conjunctivae normal       Pupils: Pupils are equal, round, and reactive to light  Neck:      Vascular: No carotid bruit        Comments: Thyroid enlargement, nodules or JVD  Cardiovascular:      Rate and Rhythm: Normal rate and regular rhythm  Heart sounds: No murmur heard  Pulmonary:      Effort: Pulmonary effort is normal       Breath sounds: Normal breath sounds  Comments: Presently no crackles rhonchi wheeze or expiratory delay  Genitourinary:     Prostate: Normal       Rectum: Normal       Comments: He has normal anal tone, rectum is without mass  His prostate is normal size for age in he has no nodule or other focality noted  Musculoskeletal:      Right lower leg: No edema  Left lower leg: No edema  Lymphadenopathy:      Cervical: No cervical adenopathy  Neurological:      General: No focal deficit present  Mental Status: He is alert and oriented to person, place, and time  Psychiatric:         Mood and Affect: Mood normal          Thought Content:  Thought content normal          Judgment: Judgment normal          Wt Readings from Last 12 Encounters:   03/03/22 102 kg (225 lb)   12/04/20 101 kg (223 lb)   11/27/20 97 5 kg (215 lb)   01/20/20 101 kg (222 lb)   04/12/19 104 kg (230 lb)   11/27/18 103 kg (227 lb)   07/17/18 104 kg (229 lb)   07/02/18 104 kg (230 lb)   02/27/18 102 kg (225 lb)   02/23/18 104 kg (229 lb)   02/20/18 102 kg (225 lb)   01/31/18 102 kg (225 lb)   ]

## 2022-03-04 LAB
ALBUMIN SERPL-MCNC: 4.4 G/DL (ref 3.8–4.9)
ALBUMIN/GLOB SERPL: 1.8 {RATIO} (ref 1.2–2.2)
ALP SERPL-CCNC: 72 IU/L (ref 44–121)
ALT SERPL-CCNC: 20 IU/L (ref 0–44)
AST SERPL-CCNC: 20 IU/L (ref 0–40)
BILIRUB SERPL-MCNC: 0.7 MG/DL (ref 0–1.2)
BUN SERPL-MCNC: 13 MG/DL (ref 6–24)
BUN/CREAT SERPL: 11 (ref 9–20)
CALCIUM SERPL-MCNC: 9.2 MG/DL (ref 8.7–10.2)
CHLORIDE SERPL-SCNC: 105 MMOL/L (ref 96–106)
CHOLEST SERPL-MCNC: 175 MG/DL (ref 100–199)
CHOLEST/HDLC SERPL: 4 RATIO (ref 0–5)
CO2 SERPL-SCNC: 25 MMOL/L (ref 20–29)
CREAT SERPL-MCNC: 1.16 MG/DL (ref 0.76–1.27)
EGFR: 73 ML/MIN/1.73
ERYTHROCYTE [DISTWIDTH] IN BLOOD BY AUTOMATED COUNT: 13.1 % (ref 11.6–15.4)
GLOBULIN SER-MCNC: 2.5 G/DL (ref 1.5–4.5)
GLUCOSE SERPL-MCNC: 83 MG/DL (ref 65–99)
HCT VFR BLD AUTO: 44 % (ref 37.5–51)
HDLC SERPL-MCNC: 44 MG/DL
HGB BLD-MCNC: 14.4 G/DL (ref 13–17.7)
HIV 1+2 AB+HIV1 P24 AG SERPL QL IA: NON REACTIVE
LDLC SERPL CALC-MCNC: 114 MG/DL (ref 0–99)
MCH RBC QN AUTO: 28.5 PG (ref 26.6–33)
MCHC RBC AUTO-ENTMCNC: 32.7 G/DL (ref 31.5–35.7)
MCV RBC AUTO: 87 FL (ref 79–97)
PLATELET # BLD AUTO: 240 X10E3/UL (ref 150–450)
POTASSIUM SERPL-SCNC: 4.3 MMOL/L (ref 3.5–5.2)
PROT SERPL-MCNC: 6.9 G/DL (ref 6–8.5)
RBC # BLD AUTO: 5.05 X10E6/UL (ref 4.14–5.8)
SL AMB VLDL CHOLESTEROL CALC: 17 MG/DL (ref 5–40)
SODIUM SERPL-SCNC: 144 MMOL/L (ref 134–144)
TRIGL SERPL-MCNC: 90 MG/DL (ref 0–149)
WBC # BLD AUTO: 6.1 X10E3/UL (ref 3.4–10.8)

## 2023-03-01 ENCOUNTER — HOSPITAL ENCOUNTER (EMERGENCY)
Facility: HOSPITAL | Age: 63
Discharge: HOME | End: 2023-03-01
Attending: EMERGENCY MEDICINE | Admitting: EMERGENCY MEDICINE

## 2023-03-01 VITALS
HEIGHT: 74 IN | OXYGEN SATURATION: 99 % | BODY MASS INDEX: 29 KG/M2 | DIASTOLIC BLOOD PRESSURE: 76 MMHG | SYSTOLIC BLOOD PRESSURE: 138 MMHG | WEIGHT: 225.97 LBS | RESPIRATION RATE: 18 BRPM | TEMPERATURE: 98.6 F | HEART RATE: 68 BPM
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
## 2023-03-02 NOTE — DISCHARGE INSTRUCTIONS
Monitor symptoms and return to the emergency department if any symptoms worsen or you develop new concerning symptoms  You for allowing us to take part in your care
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
## 2023-03-09 ENCOUNTER — OFFICE VISIT (OUTPATIENT)
Dept: FAMILY MEDICINE CLINIC | Facility: CLINIC | Age: 63
End: 2023-03-09

## 2023-03-09 VITALS
BODY MASS INDEX: 29.13 KG/M2 | SYSTOLIC BLOOD PRESSURE: 124 MMHG | WEIGHT: 227 LBS | OXYGEN SATURATION: 97 % | DIASTOLIC BLOOD PRESSURE: 82 MMHG | RESPIRATION RATE: 16 BRPM | HEART RATE: 82 BPM | HEIGHT: 74 IN

## 2023-03-09 DIAGNOSIS — Z13.0 SCREENING FOR DEFICIENCY ANEMIA: ICD-10-CM

## 2023-03-09 DIAGNOSIS — Z13.1 SCREENING FOR DIABETES MELLITUS: ICD-10-CM

## 2023-03-09 DIAGNOSIS — D22.9 MULTIPLE NEVI: ICD-10-CM

## 2023-03-09 DIAGNOSIS — D22.9 MULTIPLE ATYPICAL NEVI: ICD-10-CM

## 2023-03-09 DIAGNOSIS — J20.9 BRONCHOSPASM WITH BRONCHITIS, ACUTE: ICD-10-CM

## 2023-03-09 DIAGNOSIS — Z00.00 ENCOUNTER FOR PREVENTATIVE ADULT HEALTH CARE EXAMINATION: Primary | ICD-10-CM

## 2023-03-09 DIAGNOSIS — N52.03 COMBINED ARTERIAL INSUFFICIENCY AND CORPORO-VENOUS OCCLUSIVE ERECTILE DYSFUNCTION: ICD-10-CM

## 2023-03-09 DIAGNOSIS — Z13.220 SCREENING CHOLESTEROL LEVEL: ICD-10-CM

## 2023-03-09 RX ORDER — FLUTICASONE PROPIONATE 110 UG/1
2 AEROSOL, METERED RESPIRATORY (INHALATION) 2 TIMES DAILY
Qty: 12 G | Refills: 2 | Status: SHIPPED | OUTPATIENT
Start: 2023-03-09

## 2023-03-09 RX ORDER — SILDENAFIL 25 MG/1
25 TABLET, FILM COATED ORAL DAILY PRN
Qty: 10 TABLET | Refills: 0 | Status: SHIPPED | OUTPATIENT
Start: 2023-03-09

## 2023-03-09 RX ORDER — LANOLIN ALCOHOL/MO/W.PET/CERES
2 CREAM (GRAM) TOPICAL DAILY
COMMUNITY

## 2023-03-09 RX ORDER — ALBUTEROL SULFATE 90 UG/1
2 AEROSOL, METERED RESPIRATORY (INHALATION) EVERY 4 HOURS PRN
Qty: 18 G | Refills: 2 | Status: SHIPPED | OUTPATIENT
Start: 2023-03-09

## 2023-03-09 RX ORDER — ZINC GLUCONATE 50 MG
50 TABLET ORAL DAILY
COMMUNITY

## 2023-03-09 NOTE — PATIENT INSTRUCTIONS
I have refilled your Flovent and albuterol  Please with the Flovent make sure that you are washing out your mouth to avoid any thrush or mouth issues  Please follow-up with the specialist, dermatology that I referred you today  If you do not hear from somebody in 3 to 4 days you can call central scheduling or call the office for further advice  Please obtain the labs that I have ordered for you    They should be fasting, no food or drink except for water for 8 to 12 hours before

## 2023-03-09 NOTE — PROGRESS NOTES
New Patient Outpatient Note    HPI:     oBbby Parks, 63 y o  male  presents today as a new patient and to establish care  Patient presents today to have his chronic medications refilled  He is currently on albuterol and Flovent as needed  For work he travels frequently and this requires him to be exposed to many different people  Previously he had multiple episodes of bronchitis, thankfully these medications have improved his symptoms overall and he is taking over-the-counter supplements to help improve his immunity with zinc combination tablets  Administrative had a poor reaction to the COVID vaccine, his was more benign but did affect the right knee and hip  He had the Moderna  since his administration of the vaccine his musculoskeletal issues have been improving over time  Currently he is not sexually active frequently with his wife  He has a trip coming up that may pose the opportunity to be intimidate and he is interested in a refill of viagra for as needed improvement with erectile dysfunction       Family Hx  UTD in chart    Past Medical History:   Diagnosis Date   • Bronchospasm with bronchitis, acute 7/2/2018   • Community acquired pneumonia of left lower lobe of lung 2/12/2018   • Impacted cerumen of left ear 1/20/2020   • Influenza 1/31/2018      Past Surgical History:   Procedure Laterality Date   • DENTAL SURGERY     • HERNIA REPAIR            Current Outpatient Medications:   •  albuterol (Ventolin HFA) 90 mcg/act inhaler, Inhale 2 puffs every 4 (four) hours as needed for wheezing, Disp: 18 g, Rfl: 2  •  fluticasone (FLOVENT HFA) 110 MCG/ACT inhaler, Inhale 2 puffs 2 (two) times a day Rinse mouth after use , Disp: 12 g, Rfl: 2  •  glucosamine-chondroitin 500-400 MG tablet, Take 2 tablets by mouth in the morning, Disp: , Rfl:   •  Quercetin 250 MG TABS, Take by mouth, Disp: , Rfl:   •  sildenafil (VIAGRA) 25 MG tablet, Take 1 tablet (25 mg total) by mouth daily as needed for erectile dysfunction, Disp: 10 tablet, Rfl: 0  •  zinc gluconate 50 mg tablet, Take 50 mg by mouth daily, Disp: , Rfl:      SOCIAL:   Rent/Own: Own  Currently living with: Significant other  Stable food: Yes  Safe At Home: Yes  Hobbies: artist  Profession/ employment: artist  Restriction to medical procedures: None    SEXUAL HISTORY:   Preference:  Women  Sexually Active: Not currently  Birth Control: Post menapusal    Psychological History  Psychiatric history: None  History of inpatient:  None  Current Therapy/ Provider:  None  Current Medications:  None    Substance History  Smoking: None  Alcohol Use: None  Substance use:  none    ROS:   Review of Systems   Constitutional: Negative for chills, fever and unexpected weight change  HENT: Positive for postnasal drip ( with allergies) and rhinorrhea ( allergies)  Negative for congestion, ear discharge, ear pain, sinus pressure, sinus pain and sore throat  Eyes: Negative for visual disturbance  Respiratory: Negative for cough, chest tightness and shortness of breath  Cardiovascular: Negative for chest pain and palpitations  Gastrointestinal: Negative for abdominal pain, blood in stool, constipation, diarrhea, nausea and vomiting  Genitourinary: Negative for dysuria and frequency  Skin: Negative for rash and wound  Neurological: Positive for light-headedness (extreme exertion)  Negative for dizziness and headaches  Psychiatric/Behavioral: Negative for self-injury and suicidal ideas  OBJECTIVE  Vitals:    03/09/23 0951   BP: 124/82   Pulse: 82   Resp: 16   SpO2: 97%        Physical Exam  Constitutional:       General: He is not in acute distress  Appearance: Normal appearance  He is not ill-appearing, toxic-appearing or diaphoretic  HENT:      Head: Normocephalic and atraumatic  Right Ear: Tympanic membrane, ear canal and external ear normal  There is no impacted cerumen        Left Ear: Tympanic membrane, ear canal and external ear normal  There is no impacted cerumen  Nose: Nose normal  No congestion or rhinorrhea  Mouth/Throat:      Mouth: Mucous membranes are moist       Pharynx: Oropharynx is clear  No oropharyngeal exudate or posterior oropharyngeal erythema  Eyes:      General:         Right eye: No discharge  Left eye: No discharge  Extraocular Movements: Extraocular movements intact  Pupils: Pupils are equal, round, and reactive to light  Cardiovascular:      Rate and Rhythm: Normal rate and regular rhythm  Heart sounds: Normal heart sounds  No murmur heard  No friction rub  No gallop  Pulmonary:      Effort: Pulmonary effort is normal  No respiratory distress  Breath sounds: Normal breath sounds  No stridor  No wheezing, rhonchi or rales  Abdominal:      General: Bowel sounds are normal  There is no distension  Palpations: Abdomen is soft  Tenderness: There is no abdominal tenderness  Musculoskeletal:      Cervical back: Normal range of motion and neck supple  Skin:     General: Skin is warm  Findings: Lesion (several darker lesion on back, small and symmetrical   Large lentigo on face with slightly darker region  ) present  Neurological:      Mental Status: He is alert  Sensory: No sensory deficit ( light touch in all 4 extremities)  Motor: No weakness ( 5/5 in all 4 extremities)  Deep Tendon Reflexes: Reflexes normal (2/4, intact, C5, C6, L4, S1)  ASSESSMENT AND PLAN   Floresita Jorgensen was seen today for new patient visit  Diagnoses and all orders for this visit:    Encounter for preventative adult health care examination  Screening cholesterol level  Screening for deficiency anemia  Screening for diabetes mellitus  Patient is interested in obtaining baseline labs for sugars, electrolytes, kidney function, liver function, blood counts, and cholesterol  Lab work to be completed with wife in 3 months after improving weight and diet     -     CBC and differential; Future  -     Comprehensive metabolic panel; Future  -     Lipid panel; Future    Bronchospasm with bronchitis, acute  Patient has history of frequent bronchitis and what sounds to be exercise induced asthma  Will continue with current treatment with albuterol and flovent as needed  -     fluticasone (FLOVENT HFA) 110 MCG/ACT inhaler; Inhale 2 puffs 2 (two) times a day Rinse mouth after use  -     albuterol (Ventolin HFA) 90 mcg/act inhaler; Inhale 2 puffs every 4 (four) hours as needed for wheezing    Combined arterial insufficiency and corporo-venous occlusive erectile dysfunction  Small amount of Viagra given to patient to help with his performance on trip coming up  We reviewed the significant concern with Viagra and nitroglycerin  His significant other was also present and was informed  -     sildenafil (VIAGRA) 25 MG tablet; Take 1 tablet (25 mg total) by mouth daily as needed for erectile dysfunction    Multiple nevi  Multiple atypical nevi  History of skin lesion removal and abnormal lentigo of the face  There is an area of increase pigment within the facial lesion  He also has several lesions that are slightly darker but are symmetrical on back  -     Ambulatory Referral to Dermatology;  Future           Cristian Silveira DO  [facility]  3/9/2023 12:51 PM

## 2024-03-25 ENCOUNTER — TELEPHONE (OUTPATIENT)
Age: 64
End: 2024-03-25

## 2024-03-25 NOTE — TELEPHONE ENCOUNTER
A concern bump on face that's raised bleeds when shaves bigger than before new patient is getting referral from doctor       Please advise

## 2024-03-25 NOTE — TELEPHONE ENCOUNTER
Called and left message for patient to give us a call back so we could get [facility] scheduled [facility] in a SOC slot ok by Dr. Smith for a raised lesion on the face that bleeds when he shaves and is getting bigger

## 2024-03-27 ENCOUNTER — OFFICE VISIT (OUTPATIENT)
Dept: FAMILY MEDICINE CLINIC | Facility: CLINIC | Age: 64
End: 2024-03-27
Payer: COMMERCIAL

## 2024-03-27 VITALS
HEART RATE: 65 BPM | BODY MASS INDEX: 25.8 KG/M2 | HEIGHT: 74 IN | OXYGEN SATURATION: 100 % | SYSTOLIC BLOOD PRESSURE: 120 MMHG | WEIGHT: 201 LBS | DIASTOLIC BLOOD PRESSURE: 60 MMHG

## 2024-03-27 DIAGNOSIS — L98.9 BENIGN SKIN LESION OF MULTIPLE SITES: Primary | ICD-10-CM

## 2024-03-27 DIAGNOSIS — L81.9 CHANGE IN MULTIPLE PIGMENTED SKIN LESIONS: ICD-10-CM

## 2024-03-27 DIAGNOSIS — D22.9 FLESHY SKIN MOLE: ICD-10-CM

## 2024-03-27 PROCEDURE — 99213 OFFICE O/P EST LOW 20 MIN: CPT | Performed by: FAMILY MEDICINE

## 2024-03-27 NOTE — PROGRESS NOTES
Outpatient Note- Follow up     HPI:     Ramsey Rodgers , 63 y.o. male  presents today for multiple skin lesions.  The patient noted that the area that he frequently shaves and is irritated by shaving has grown.  There raised, pinkish in color/tan, and they have been getting larger over the course of the last 4 to 5 months.  They are not bleeding unless shaved.  There is no pain, and itching is only really associated when he is feeling the area and scratching it.  He has had a lesion removed previously that was benign on his back, it was removed due to its color, shape, and evolution.  No further complications.  Patient currently does not have significant sun exposure, but in his young career as a  he had significant exposures at that time.  He denies any fever, chills, weight loss, sweats.  No family history of melanoma or skin cancer.    Past Medical History:   Diagnosis Date    Bronchospasm with bronchitis, acute 7/2/2018    Community acquired pneumonia of left lower lobe of lung 2/12/2018    Impacted cerumen of left ear 1/20/2020    Influenza 1/31/2018      ROS:   Review of Systems   See HPI    OBJECTIVE  Vitals:    03/27/24 1025   BP: 120/60   Pulse: 65   SpO2: 100%        Physical Exam  Constitutional:       General: He is not in acute distress.     Appearance: Normal appearance. He is normal weight. He is not ill-appearing, toxic-appearing or diaphoretic.   HENT:      Head: Normocephalic and atraumatic.   Skin:     General: Skin is warm.   Neurological:      Mental Status: He is alert.                                                  ASSESSMENT AND PLAN   Ramsey was seen today for mass.    Diagnoses and all orders for this visit:    Benign skin lesion of multiple sites  Change in multiple pigmented skin lesions  Fleshy skin mole  Patient came due to concern of multiple fleshy moles that have grown.  I reassured the patient about these moles, but during his full skin examination, there were several that  were darker than others, asymmetric, and may require follow-up.  The most concerning was an amelanotic lesion on his right forearm that has been scaling and sometimes is very itchy.  I was able to obtain dermoscopy of this and will follow-up with a local dermatologist to determine if this requires biopsy.  It is possible that it may be a flat seborrheic keratosis, but will rule out more concerning finding.  Patient would benefit from full body scan from dermatology, currently scheduled for December 2024.  If any lesions are concerning, I would appreciate if dermatology was able to get him in sooner.           Mark Anthony Man DO  [facility]  3/28/2024 8:01 AM

## 2024-03-27 NOTE — Clinical Note
Good morning Dr. Lerma,   I hope this message finds you well.  I reassured patient about most of his lesions and thought that his appointment in December was okay.  There was one amelanotic lesion that I wanted you to review.  It is subtle but present in one of the photos.  If you feel he requires follow up sooner then let me know and I will inform patient to watch for a phone call.  Thank you again in advance for your time and effort.   Mark Anthony

## 2024-03-28 ENCOUNTER — TELEPHONE (OUTPATIENT)
Dept: FAMILY MEDICINE CLINIC | Facility: CLINIC | Age: 64
End: 2024-03-28

## 2024-03-28 NOTE — TELEPHONE ENCOUNTER
"----- Message from Anne Lerma MD sent at 3/28/2024  8:48 AM EDT -----  He must be scheduled at another practice? Looks like he already called and the staff were going to give him an appointmnet, but couldn't reach him? Maybe ask him to call again?    \"Called and left message for patient to give us a call back so we could get him scheduled on the [facility] clinic in a SOC slot ok by Dr. Smith for a raised lesion on the face that bleeds when he shaves and is getting bigger\"  ----- Message -----  From: Mark Anthony Man DO  Sent: 3/28/2024   8:06 AM EDT  To: Anne Lerma MD    Good morning Dr. Lerma,     I hope this message finds you well.  I reassured patient about most of his lesions and thought that his appointment in December was okay.  There was one amelanotic lesion that I wanted you to review.  It is subtle but present in one of the photos.  If you feel he requires follow up sooner then let me know and I will inform patient to watch for a phone call.  Thank you again in advance for your time and effort.     Mark Anthony"

## 2024-03-28 NOTE — TELEPHONE ENCOUNTER
Reviewed that the patient should call back since it seems like they attempted to call and he did not get the message. Patient appreciative for he call.     DO [facility]  3/28/2024 5:54 PM

## 2024-04-01 ENCOUNTER — OFFICE VISIT (OUTPATIENT)
Dept: FAMILY MEDICINE CLINIC | Facility: CLINIC | Age: 64
End: 2024-04-01
Payer: COMMERCIAL

## 2024-04-01 VITALS
DIASTOLIC BLOOD PRESSURE: 76 MMHG | HEART RATE: 68 BPM | SYSTOLIC BLOOD PRESSURE: 126 MMHG | RESPIRATION RATE: 16 BRPM | WEIGHT: 200 LBS | BODY MASS INDEX: 25.67 KG/M2 | OXYGEN SATURATION: 99 % | HEIGHT: 74 IN

## 2024-04-01 DIAGNOSIS — Z13.220 SCREENING CHOLESTEROL LEVEL: ICD-10-CM

## 2024-04-01 DIAGNOSIS — Z00.00 ENCOUNTER FOR PREVENTIVE CARE: ICD-10-CM

## 2024-04-01 DIAGNOSIS — H25.9 AGE-RELATED CATARACT OF BOTH EYES, UNSPECIFIED AGE-RELATED CATARACT TYPE: ICD-10-CM

## 2024-04-01 DIAGNOSIS — Z53.20 SCREENING FOR HEPATITIS C DECLINED: ICD-10-CM

## 2024-04-01 DIAGNOSIS — Z13.1 SCREENING FOR DIABETES MELLITUS: ICD-10-CM

## 2024-04-01 DIAGNOSIS — Z13.0 SCREENING FOR DEFICIENCY ANEMIA: Primary | ICD-10-CM

## 2024-04-01 DIAGNOSIS — Z12.5 SCREENING FOR MALIGNANT NEOPLASM OF PROSTATE: ICD-10-CM

## 2024-04-01 PROCEDURE — 99396 PREV VISIT EST AGE 40-64: CPT | Performed by: FAMILY MEDICINE

## 2024-04-01 NOTE — PROGRESS NOTES
ADULT ANNUAL PHYSICAL  [facility] [place]    NAME: Ramsey Rodgers  AGE: 63 y.o. SEX: male  : 1960     DATE: 2024     Assessment and Plan:     Problem List Items Addressed This Visit    None  Visit Diagnoses       Screening for deficiency anemia    -  Primary    Relevant Orders    CBC and differential    Screening for diabetes mellitus        Relevant Orders    Comprehensive metabolic panel    Screening cholesterol level        Relevant Orders    Lipid panel    Screening for malignant neoplasm of prostate        Relevant Orders    PSA, Total Screen    Encounter for preventive care        Relevant Orders    CBC and differential    Comprehensive metabolic panel    Lipid panel    PSA, Total Screen    Screening for hepatitis C declined        Age-related cataract of both eyes, unspecified age-related cataract type        Relevant Orders    Ambulatory Referral to Ophthalmology            Patient presents for yearly physical.  He has not had recent labs done.  Will obtain CBC, CMP, lipid panel, PSA for evaluation of anemia, electrolytes, kidney function, liver function, electrolytes, cholesterol, and prostate-specific antigen.  PSA was ordered due to increased risk from dad having prostate cancer.  Patient occasionally has nocturia, but it is usually due to fluid intake.  I reviewed screening for hepatitis C, patient declined.  Also reviewed need for colonoscopy/Cologuard.  Patient is not interested in colonoscopy at this time, he will call his insurance to see whether or not the Cologuard can be obtained.  Will follow-up in several weeks.  Additionally on physical exam he had evidence of cataracts.  Will have him follow-up with ophthalmology since his eyes are significantly important to his work and he is requesting a evaluation.    Immunizations and preventive care screenings were discussed with patient today. Appropriate education was printed on patient's  after visit summary.    Discussed risks and benefits of prostate cancer screening. We discussed the controversial history of PSA screening for prostate cancer in the [place] as well as the risk of over detection and over treatment of prostate cancer by way of PSA screening.  The patient understands that PSA blood testing is an imperfect way to screen for prostate cancer and that elevated PSA levels in the blood may also be caused by infection, inflammation, prostatic trauma or manipulation, urological procedures, or by benign prostatic enlargement.    The role of the digital rectal examination in prostate cancer screening was also discussed and I discussed with him that there is large interobserver variability in the findings of digital rectal examination.    Counseling:  Alcohol/drug use: discussed moderation in alcohol intake, the recommendations for healthy alcohol use, and avoidance of illicit drug use.  Dental Health: discussed importance of regular tooth brushing, flossing, and dental visits.  Injury prevention: discussed safety/seat belts, safety helmets, smoke detectors, carbon dioxide detectors, and smoking near bedding or upholstery.  Sexual health: discussed sexually transmitted diseases, partner selection, use of condoms, avoidance of unintended pregnancy, and contraceptive alternatives.  Exercise: the importance of regular exercise/physical activity was discussed. Recommend exercise 3-5 times per week for at least 30 minutes.          No follow-ups on file.     Chief Complaint:     Chief Complaint   Patient presents with    Physical Exam      History of Present Illness:     Adult Annual Physical   Patient here for a comprehensive physical exam. The patient reports no problems.  The patient was previously here last week to review enlarging skin lesions.  He has yet to call dermatology and reschedule, but understands he will need to do so in the next week or so.    Diet and Physical  Activity  Diet/Nutrition: well balanced diet and consuming 3-5 servings of fruits/vegetables daily.   Exercise: walking.      Depression Screening  PHQ-2/9 Depression Screening    Little interest or pleasure in doing things: 0 - not at all  Feeling down, depressed, or hopeless: 0 - not at all       General Health  Sleep: sleeps well and snoring, 8 hours .   Hearing: normal - bilateral.  Vision: wears glasses and for reading glass .   Dental: no dental visits for >1 year and brushes teeth twice daily.        Health  Symptoms include: erectile dysfunction and nocturia    Advanced Care Planning  Do you have an advanced directive? no  Do you have a durable medical power of ? no  ACP document given to patient? yes     Review of Systems:     Review of Systems   Constitutional:  Negative for chills, fever and unexpected weight change.   HENT:  Positive for rhinorrhea (in cold weather). Negative for congestion, ear discharge, ear pain, hearing loss, postnasal drip, sinus pressure, sinus pain and sore throat.    Eyes:  Positive for visual disturbance (wears reading glasses).   Respiratory:  Negative for cough, chest tightness and shortness of breath.    Cardiovascular:  Negative for chest pain and palpitations.   Gastrointestinal:  Positive for abdominal pain (low abdomen, increased gas due diet). Negative for blood in stool, constipation, diarrhea, nausea and vomiting.   Genitourinary:  Negative for dysuria and frequency.   Skin:  Negative for rash and wound.   Neurological:  Negative for dizziness, light-headedness and headaches.   Psychiatric/Behavioral:  Negative for self-injury and suicidal ideas.       Past Medical History:     Past Medical History:   Diagnosis Date    Bronchospasm with bronchitis, acute 7/2/2018    Community acquired pneumonia of left lower lobe of lung 2/12/2018    Impacted cerumen of left ear 1/20/2020    Influenza 1/31/2018      Past Surgical History:     Past Surgical History:  "  Procedure Laterality Date    DENTAL SURGERY      HERNIA REPAIR      SKIN LESION EXCISION      benign      Family History:     Family History   Problem Relation Age of Onset    Diabetes type I Mother     Prostate cancer Father     Coronary artery disease Maternal Grandfather     Lung cancer Paternal Grandmother     Cancer Paternal Grandfather     Colon cancer Neg Hx     Testicular cancer Neg Hx       Social History:     Social History     Socioeconomic History    Marital status: /Civil Union     Spouse name: None    Number of children: None    Years of education: None    Highest education level: None   Occupational History    None   Tobacco Use    Smoking status: Never    Smokeless tobacco: Never   Vaping Use    Vaping status: Never Used   Substance and Sexual Activity    Alcohol use: No    Drug use: No    Sexual activity: Not Currently     Partners: Female   Other Topics Concern    None   Social History Narrative    None     Social Determinants of Health     Financial Resource Strain: Not on file   Food Insecurity: Not on file   Transportation Needs: Not on file   Physical Activity: Not on file   Stress: Not on file   Social Connections: Not on file   Intimate Partner Violence: Not on file   Housing Stability: Not on file      Current Medications:     Current Outpatient Medications   Medication Sig Dispense Refill    albuterol (Ventolin HFA) 90 mcg/act inhaler Inhale 2 puffs every 4 (four) hours as needed for wheezing 18 g 2    fluticasone (FLOVENT HFA) 110 MCG/ACT inhaler Inhale 2 puffs 2 (two) times a day Rinse mouth after use. 12 g 2     No current facility-administered medications for this visit.      Allergies:     Allergies   Allergen Reactions    Amoxicillin Hives    Penicillins Hives     ? Amoxicillian      Physical Exam:     /76 (BP Location: Left arm, Patient Position: Sitting, Cuff Size: Standard)   Pulse 68   Resp 16   Ht 6' 2\" (1.88 m)   Wt 90.7 kg (200 lb)   SpO2 99%   BMI 25.68 " kg/m²     Physical Exam  Constitutional:       General: He is not in acute distress.     Appearance: Normal appearance. He is normal weight. He is not ill-appearing, toxic-appearing or diaphoretic.   HENT:      Head: Normocephalic and atraumatic.      Right Ear: Tympanic membrane, ear canal and external ear normal. There is no impacted cerumen.      Left Ear: Tympanic membrane, ear canal and external ear normal. There is no impacted cerumen.      Nose: Nose normal. No congestion or rhinorrhea.      Comments: Deviated septum to the left     Mouth/Throat:      Mouth: Mucous membranes are moist.      Pharynx: Oropharynx is clear. No oropharyngeal exudate or posterior oropharyngeal erythema.   Eyes:      General:         Right eye: No discharge.         Left eye: No discharge.      Conjunctiva/sclera: Conjunctivae normal.      Pupils: Pupils are equal, round, and reactive to light.      Comments: Bilateral cataract present     Cardiovascular:      Rate and Rhythm: Normal rate and regular rhythm.      Heart sounds: Normal heart sounds. No murmur heard.     No friction rub. No gallop.   Pulmonary:      Effort: Pulmonary effort is normal. No respiratory distress.      Breath sounds: Normal breath sounds. No stridor. No wheezing, rhonchi or rales.   Abdominal:      General: Bowel sounds are normal. There is no distension.      Palpations: Abdomen is soft.      Tenderness: There is no abdominal tenderness.   Musculoskeletal:      Cervical back: Neck supple. No tenderness.   Lymphadenopathy:      Cervical: No cervical adenopathy.   Skin:     General: Skin is warm.      Capillary Refill: Capillary refill takes less than 2 seconds.   Neurological:      Mental Status: He is alert.      Motor: No weakness (5/5 in all 4 extremities).          Mark Anthony Man,   [facility] [place]

## 2024-04-18 ENCOUNTER — TELEPHONE (OUTPATIENT)
Dept: FAMILY MEDICINE CLINIC | Facility: CLINIC | Age: 64
End: 2024-04-18

## 2024-04-18 NOTE — TELEPHONE ENCOUNTER
Called and left message to call back or send a mychart about cologuard.     DO [facility]  4/18/2024 1:46 PM

## 2024-04-18 NOTE — TELEPHONE ENCOUNTER
----- Message from Mark Anthony Man DO sent at 4/1/2024  8:46 AM EDT -----  Follow-up Cologuard discussion

## 2024-05-02 ENCOUNTER — OFFICE VISIT (OUTPATIENT)
Dept: DERMATOLOGY | Facility: CLINIC | Age: 64
End: 2024-05-02
Payer: COMMERCIAL

## 2024-05-02 VITALS — BODY MASS INDEX: 24.9 KG/M2 | WEIGHT: 194 LBS | HEIGHT: 74 IN

## 2024-05-02 DIAGNOSIS — L81.4 SOLAR LENTIGO: ICD-10-CM

## 2024-05-02 DIAGNOSIS — L82.1 SEBORRHEIC KERATOSES: Primary | ICD-10-CM

## 2024-05-02 PROCEDURE — 99203 OFFICE O/P NEW LOW 30 MIN: CPT | Performed by: DERMATOLOGY

## 2024-05-02 NOTE — PROGRESS NOTES
"[facility] Clinic Note     Patient Name: Ramsey Rodgers  Encounter Date: 05/02/2024     Have you been cared for by a [facility] Dermatologist in the last 3 years and, if so, which description applies to you?    NO.   I am considered a \"new\" patient and must complete all patient intake questions. I am MALE/not capable of bearing children.    REVIEW OF SYSTEMS:  Have you recently had or currently have any of the following? Recent fever or chills? No  Any non-healing wound? No   PAST MEDICAL HISTORY:  Have you personally ever had or currently have any of the following?  If \"YES,\" then please provide more detail. Skin cancer (such as Melanoma, Basal Cell Carcinoma, Squamous Cell Carcinoma?  No  Tuberculosis, HIV/AIDS, Hepatitis B or C: No  Radiation Treatment No   HISTORY OF IMMUNOSUPPRESSION:   Do you have a history of any of the following:  Systemic Immunosuppression such as Diabetes, Biologic or Immunotherapy, Chemotherapy, Organ Transplantation, Bone Marrow Transplantation?  No     Answering \"YES\" requires the addition of the dotphrase \"IMMUNOSUPPRESSED\" as the first diagnosis of the patient's visit.   FAMILY HISTORY:  Any \"first degree relatives\" (parent, brother, sister, or child) with the following?    Skin Cancer, Pancreatic or Other Cancer? YES, father- prostate    PATIENT EXPERIENCE:    Do you want the Dermatologist to perform a COMPLETE skin exam today including a clinical examination under the \"bra and underwear\" areas?  NO  If necessary, do we have your permission to call and leave a detailed message on your Preferred Phone number that includes your specific medical information?  Yes      Allergies   Allergen Reactions    Amoxicillin Hives    Penicillins Hives     ? Amoxicillian      Current Outpatient Medications:     albuterol (Ventolin HFA) 90 mcg/act inhaler, Inhale 2 puffs every 4 (four) hours as needed for wheezing, Disp: 18 g, Rfl: 2    fluticasone (FLOVENT HFA) 110 MCG/ACT inhaler, Inhale " "2 puffs 2 (two) times a day Rinse mouth after use., Disp: 12 g, Rfl: 2          Whom besides the patient is providing clinical information about today's encounter?   NO ADDITIONAL HISTORIAN (patient alone provided history)    Physical Exam and Assessment/Plan by Diagnosis:      Patient is present for spot of concern on face that has been present for several months. Patient states this primary care doctor would like a spot on right arm looked at.     SEBORRHEIC KERATOSIS; NON-INFLAMED    Physical Exam:  Anatomic Location Affected:  brown hyperkeratotic papules  Morphological Description:  Flat and raised, waxy, smooth to warty textured, yellow to brownish-grey to dark brown to blackish, discrete, \"stuck-on\" appearing papules.  Pertinent Positives:  Pertinent Negatives:    Additional History of Present Condition:  Patient is present for spot of concern on face that has been present for several months. Patient states this primary care doctor would like a spot on right arm looked at.     Assessment and Plan:  Based on a thorough discussion of this condition and the management approach to it (including a comprehensive discussion of the known risks, side effects and potential benefits of treatment), the patient (family) agrees to implement the following specific plan:  Consult cosmetics for chemical peel or IPL for Seb Ks and lentigos  If does not respond with 2-3 treatments, will need to reconsider biopsy     Seborrheic Keratosis  A seborrheic keratosis is a harmless warty spot that appears during adult life as a common sign of skin aging.  Seborrheic keratoses can arise on any area of skin, covered or uncovered, with the usual exception of the palms and soles. They do not arise from mucous membranes. Seborrheic keratoses can have highly variable appearance.      Seborrheic keratoses are extremely common. It has been estimated that over 90% of adults over the age of 60 years have one or more of them. They occur in males " "and females of all races, typically beginning to erupt in the 30s or 40s. They are uncommon under the age of 20 years.  The precise cause of seborrhoeic keratoses is not known.  Seborrhoeic keratoses are considered degenerative in nature. As time goes by, seborrheic keratoses tend to become more numerous. Some people inherit a tendency to develop a very large number of them; some people may have hundreds of them.    The name \"seborrheic keratosis\" is misleading, because these lesions are not limited to a seborrhoeic distribution (scalp, mid-face, chest, upper back), nor are they formed from sebaceous glands, nor are they associated with sebum -- which is greasy.  Seborrheic keratosis may also be called \"SK,\" \"Seb K,\" \"basal cell papilloma,\" \"senile wart,\" or \"barnacle.\"      Researchers have noted:  Eruptive seborrhoeic keratoses can follow sunburn or dermatitis  Skin friction may be the reason they appear in body folds  Viral cause (e.g., human papillomavirus) seems unlikely  Stable and clonal mutations or activation of FRFR3, PIK3CA, LEMUEL, AKT1 and EGFR genes are found in seborrhoeic keratoses  Seborrhoeic keratosis can arise from solar lentigo  FRFR3 mutations also arise in solar lentigines. These mutations are associated with increased age and location on the head and neck, suggesting a role of ultraviolet radiation in these lesions  Seborrheic keratoses do not harbour tumour suppressor gene mutations  Epidermal growth factor receptor inhibitors, which are used to treat some cancers, often result in an increase in verrucal (warty) keratoses.    There is no easy way to remove multiple lesions on a single occasion.  Unless a specific lesion is \"inflamed\" and is causing pain or stinging/burning or is bleeding, most insurance companies do not authorize treatment.     SOLAR LENTIGINES   OTHER SKIN CHANGES DUE TO CHRONIC EXPOSURE TO NONIONIZING RADIATION     Physical Exam:  Anatomic Location Affected:  Sun exposed " areas of back, chest, arms, legs  Morphological Description:  Multiple scattered brown to tan evenly pigmented macules   Denies pain, itch, bleeding. No treatments tried. Present for months - years. Reports getting newer lesions with sun exposure.         Assessment and Plan:  Based on a thorough discussion of this condition and the management approach to it (including a comprehensive discussion of the known risks, side effects and potential benefits of treatment), the patient (family) agrees to implement the following specific plan:  Reassure benign  Use sun protection.  Apply SPF 30 or higher at least three times a day.  Wear sun protecting clothing and hats.         Scribe Attestation      I,:  Jenn Butler am acting as a scribe while in the presence of the attending physician.:       I,:  Nawaf Valle MD personally performed the services described in this documentation    as scribed in my presence.:

## 2024-05-02 NOTE — PATIENT INSTRUCTIONS
"[facility] Note     Patient Name: Ramsey Rodgers  Encounter Date: 05/02/2024      Have you been cared for by a [facility] Dermatologist in the last 3 years and, if so, which description applies to you?     NO.   I am considered a \"new\" patient and must complete all patient intake questions. I am MALE/not capable of bearing children.    REVIEW OF SYSTEMS:  Have you recently had or currently have any of the following? Recent fever or chills? No  Any non-healing wound? No   PAST MEDICAL HISTORY:  Have you personally ever had or currently have any of the following?  If \"YES,\" then please provide more detail. Skin cancer (such as Melanoma, Basal Cell Carcinoma, Squamous Cell Carcinoma?  No  Tuberculosis, HIV/AIDS, Hepatitis B or C: No  Radiation Treatment No   HISTORY OF IMMUNOSUPPRESSION:   Do you have a history of any of the following:  Systemic Immunosuppression such as Diabetes, Biologic or Immunotherapy, Chemotherapy, Organ Transplantation, Bone Marrow Transplantation?  No      Answering \"YES\" requires the addition of the dotphrase \"IMMUNOSUPPRESSED\" as the first diagnosis of the patient's visit.   FAMILY HISTORY:  Any \"first degree relatives\" (parent, brother, sister, or child) with the following?    Skin Cancer, Pancreatic or Other Cancer? YES, father- prostate    PATIENT EXPERIENCE:    Do you want the Dermatologist to perform a COMPLETE skin exam today including a clinical examination under the \"bra and underwear\" areas?  NO  If necessary, do we have your permission to call and leave a detailed message on your Preferred Phone number that includes your specific medical information?  Yes            Allergies   Allergen Reactions    Amoxicillin Hives    Penicillins Hives       ? Amoxicillian       Current Outpatient Medications:     albuterol (Ventolin HFA) 90 mcg/act inhaler, Inhale 2 puffs every 4 (four) hours as needed for wheezing, Disp: 18 g, Rfl: 2    fluticasone (FLOVENT HFA) 110 MCG/ACT " "inhaler, Inhale 2 puffs 2 (two) times a day Rinse mouth after use., Disp: 12 g, Rfl: 2            Whom besides the patient is providing clinical information about today's encounter?   NO ADDITIONAL HISTORIAN (patient alone provided history)     Physical Exam and Assessment/Plan by Diagnosis:        Patient is present for spot of concern on face that has been present for several months. Patient states this primary care doctor would like a spot on right arm looked at.      SEBORRHEIC KERATOSIS; NON-INFLAMED     Assessment and Plan:  Based on a thorough discussion of this condition and the management approach to it (including a comprehensive discussion of the known risks, side effects and potential benefits of treatment), the patient (family) agrees to implement the following specific plan:  Consult cosmetics for chemical peel or IPL  If does not respond with 2-3 treatments to biopsy      Seborrheic Keratosis  A seborrheic keratosis is a harmless warty spot that appears during adult life as a common sign of skin aging.  Seborrheic keratoses can arise on any area of skin, covered or uncovered, with the usual exception of the palms and soles. They do not arise from mucous membranes. Seborrheic keratoses can have highly variable appearance.       Seborrheic keratoses are extremely common. It has been estimated that over 90% of adults over the age of 60 years have one or more of them. They occur in males and females of all races, typically beginning to erupt in the 30s or 40s. They are uncommon under the age of 20 years.  The precise cause of seborrhoeic keratoses is not known.  Seborrhoeic keratoses are considered degenerative in nature. As time goes by, seborrheic keratoses tend to become more numerous. Some people inherit a tendency to develop a very large number of them; some people may have hundreds of them.     The name \"seborrheic keratosis\" is misleading, because these lesions are not limited to a seborrhoeic " "distribution (scalp, mid-face, chest, upper back), nor are they formed from sebaceous glands, nor are they associated with sebum -- which is greasy.  Seborrheic keratosis may also be called \"SK,\" \"Seb K,\" \"basal cell papilloma,\" \"senile wart,\" or \"barnacle.\"       Researchers have noted:  Eruptive seborrhoeic keratoses can follow sunburn or dermatitis  Skin friction may be the reason they appear in body folds  Viral cause (e.g., human papillomavirus) seems unlikely  Stable and clonal mutations or activation of FRFR3, PIK3CA, LEMUEL, AKT1 and EGFR genes are found in seborrhoeic keratoses  Seborrhoeic keratosis can arise from solar lentigo  FRFR3 mutations also arise in solar lentigines. These mutations are associated with increased age and location on the head and neck, suggesting a role of ultraviolet radiation in these lesions  Seborrheic keratoses do not harbour tumour suppressor gene mutations  Epidermal growth factor receptor inhibitors, which are used to treat some cancers, often result in an increase in verrucal (warty) keratoses.     There is no easy way to remove multiple lesions on a single occasion.  Unless a specific lesion is \"inflamed\" and is causing pain or stinging/burning or is bleeding, most insurance companies do not authorize treatment.      SOLAR LENTIGINES   OTHER SKIN CHANGES DUE TO CHRONIC EXPOSURE TO NONIONIZING RADIATION           Assessment and Plan:  Based on a thorough discussion of this condition and the management approach to it (including a comprehensive discussion of the known risks, side effects and potential benefits of treatment), the patient (family) agrees to implement the following specific plan:  Reassure benign  Use sun protection.  Apply SPF 30 or higher at least three times a day.  Wear sun protecting clothing and hats.     "

## 2024-09-03 ENCOUNTER — TELEPHONE (OUTPATIENT)
Age: 64
End: 2024-09-03

## 2024-09-03 DIAGNOSIS — J20.9 BRONCHOSPASM WITH BRONCHITIS, ACUTE: ICD-10-CM

## 2024-09-03 RX ORDER — FLUTICASONE PROPIONATE 110 UG/1
2 AEROSOL, METERED RESPIRATORY (INHALATION) 2 TIMES DAILY
Qty: 12 G | Refills: 5 | Status: SHIPPED | OUTPATIENT
Start: 2024-09-03

## 2024-09-03 RX ORDER — ALBUTEROL SULFATE 90 UG/1
2 AEROSOL, METERED RESPIRATORY (INHALATION) EVERY 4 HOURS PRN
Qty: 18 G | Refills: 5 | Status: SHIPPED | OUTPATIENT
Start: 2024-09-03

## 2024-09-03 NOTE — TELEPHONE ENCOUNTER
MARYI: Pt's wife Shweta stated pt has a heavy cough and has wheezing started months ago and wanted to schedule an appt. No one is available until 9/16/24. Shweta was transferred to [place] at office and she kindly assisted.

## 2024-09-03 NOTE — TELEPHONE ENCOUNTER
Both medication are  need new refills and please call patients wife she need to  patient is wheezing [phone]  Reason for call:   [x] Refill   [] Prior Auth  [] Other:     Office:   [x] PCP/Provider - Donovan Hopson  [] Specialty/Provider -     Medication: Albuterol inhaler   Dose/Frequency:90 mcg 2 inhalations Q 4 HRS PRN   Quantity: 18 g     Medication: Fluticasone 110 mcg inhaler  Dose/Frequency: 2 inhalations BID  Quantity:12 g      Pharmacy: [facility],[address]    Does the patient have enough for 3 days?   [] Yes   [x] No - Send as HP to POD  Both medication are  need new refills and please call patients wife she need to  patient is wheezing [phone]

## 2024-09-06 ENCOUNTER — OFFICE VISIT (OUTPATIENT)
Dept: FAMILY MEDICINE CLINIC | Facility: CLINIC | Age: 64
End: 2024-09-06

## 2024-09-06 VITALS
BODY MASS INDEX: 26.18 KG/M2 | DIASTOLIC BLOOD PRESSURE: 62 MMHG | OXYGEN SATURATION: 99 % | HEIGHT: 74 IN | WEIGHT: 204 LBS | TEMPERATURE: 97.5 F | SYSTOLIC BLOOD PRESSURE: 110 MMHG | HEART RATE: 65 BPM

## 2024-09-06 DIAGNOSIS — R06.2 WHEEZING: ICD-10-CM

## 2024-09-06 DIAGNOSIS — J45.21 MILD INTERMITTENT ASTHMA WITH ACUTE EXACERBATION: Primary | ICD-10-CM

## 2024-09-06 DIAGNOSIS — R05.3 CHRONIC COUGH: ICD-10-CM

## 2024-09-06 RX ORDER — PREDNISONE 20 MG/1
40 TABLET ORAL DAILY
Qty: 10 TABLET | Refills: 0 | Status: SHIPPED | OUTPATIENT
Start: 2024-09-06 | End: 2024-09-11

## 2024-09-06 NOTE — PROGRESS NOTES
Outpatient Note- Follow up     HPI:     Ramsey Rodgers , 64 y.o. male  presents today for cough, wheezing, and possible asthma exacerbation.  The patient started having symptoms more severely and end of June and early July.  Although the patient did not note his severity, others around him as well as family have noted a deep wheezing cough that happens frequently throughout the day.  Thankfully he is not waking up overnight frequently due to cough, but it has been noticeable to individuals around him that it is not improving.  Patient has been travelling multiple times over the summer and had increased time to recover from illness or feeling under the weather.      Past Medical History:   Diagnosis Date    Bronchospasm with bronchitis, acute 7/2/2018    Community acquired pneumonia of left lower lobe of lung 2/12/2018    Impacted cerumen of left ear 1/20/2020    Influenza 1/31/2018       ROS:   Review of Systems   Constitutional:  Negative for chills and fever.   HENT:  Positive for postnasal drip. Negative for congestion, ear discharge, ear pain, rhinorrhea, sinus pressure and sinus pain.    Respiratory:  Positive for cough, chest tightness and wheezing. Negative for shortness of breath.    Cardiovascular:  Negative for chest pain.      OBJECTIVE  Vitals:    09/06/24 1001   BP: 110/62   Pulse: 65   Temp: 97.5 °F (36.4 °C)   SpO2: 99%      Physical Exam  Constitutional:       General: He is not in acute distress.     Appearance: Normal appearance. He is not ill-appearing, toxic-appearing or diaphoretic.   HENT:      Head: Normocephalic and atraumatic.      Right Ear: Tympanic membrane, ear canal and external ear normal. There is no impacted cerumen.      Left Ear: Tympanic membrane, ear canal and external ear normal. There is no impacted cerumen.      Ears:      Comments: Serous effusion bilaterally, minimal, nonpurulent     Nose: Congestion (Mild, nasal sounding voice) present. No rhinorrhea.      Mouth/Throat:       Mouth: Mucous membranes are moist.      Pharynx: Oropharynx is clear. No oropharyngeal exudate or posterior oropharyngeal erythema.   Eyes:      General:         Right eye: No discharge.         Left eye: No discharge.      Pupils: Pupils are equal, round, and reactive to light.   Cardiovascular:      Rate and Rhythm: Normal rate and regular rhythm.      Heart sounds: Normal heart sounds. No murmur heard.     No friction rub. No gallop.   Pulmonary:      Effort: Pulmonary effort is normal. Prolonged expiration present. No respiratory distress.      Breath sounds: No stridor. Examination of the right-upper field reveals wheezing. Examination of the left-upper field reveals wheezing. Examination of the right-lower field reveals decreased breath sounds. Examination of the left-lower field reveals decreased breath sounds. Decreased breath sounds and wheezing present. No rhonchi or rales.   Abdominal:      General: Bowel sounds are normal. There is no distension.      Palpations: Abdomen is soft.      Tenderness: There is no abdominal tenderness.   Neurological:      Mental Status: He is alert.        ASSESSMENT AND PLAN   Ramsey was seen today for cough.  Diagnoses and all orders for this visit:    Mild intermittent asthma with acute exacerbation  Wheezing  Chronic cough  Patient has been having symptoms of acute asthma exacerbation over the last several months.  It is likely due to a lack of Flovent and albuterol use.  Will have the patient start with prednisone 40 mg daily over the next 5 days to help decrease inflammation and improve breathing.  During this time he should utilize albuterol every 4 hours over the first day, every 6 on the second day, and then as needed over the next week.  Additionally he should still restart Flovent 1 to 2 days prior to ending the steroids.  This will aid in a smooth transition hopefully to improve breathing and decreased wheezing/cough.  PFT was also ordered since he does not have  the specific diagnosis of asthma.  Therefore we will confirm or deny any lung related issues with further testing.  He should return or call with no improvement after steroids.  -     Complete PFT with post bronchodilator; Future  -     predniSONE 20 mg tablet; Take 2 tablets (40 mg total) by mouth daily for 5 days      DO [facility]  9/6/2024 10:32 AM

## 2024-09-06 NOTE — PATIENT INSTRUCTIONS
Patient Education     Mediterranean diet   The Basics   Written by the doctors and editors at [facility]   What is a Mediterranean diet? -- A Mediterranean diet is a heart-healthy way of eating. It includes foods and cooking styles from many countries around the [place], like [place] and [place]. The exact foods included vary from place to place.  A Mediterranean diet involves eating a lot of fruits, vegetables, nuts, and whole grains. It uses olive oil instead of other fats. It also includes some fish, poultry, and dairy products, but not a lot of red meat.  Wine is often thought of as part of a Mediterranean diet. It is not needed, and you might choose not to include it. If you do drink alcohol, limit the amount to:   For females, no more than 1 drink a day   For males, no more than 2 drinks a day  What are the benefits of a Mediterranean diet? -- A Mediterranean diet can help you:   Improve your overall health, and help you lose weight   Lower your risk of stroke   Lower your risk of heart problems such as a heart attack   Manage your blood sugar if you have diabetes  What can I eat and drink on a Mediterranean diet? -- A Mediterranean diet is more of an eating pattern than a strict diet. Try to cover two-thirds of your plate with fresh fruits and vegetables. Some examples of foods that are often part of this pattern:   Grains - Whole grains like whole-grain bread and pasta, oats, couscous, brown rice, barley, and orzo.   Fruits - Many kinds and colors of fresh, frozen, dried, or canned fruits. Frozen or canned fruits with 100% fruit juice or water (without added sugar). Examples include apples, pears, berries, melons, bananas, plums, raisins, figs, and peaches.   Vegetables - Many kinds and colors of fresh, frozen, or canned vegetables. If canned, low sodium or salt free. If frozen, without added fat and sodium. Examples include avocados, peppers, tomatoes, spinach, kale, beans, carrots, peas, olives,  cucumbers, hummus, soybeans, lentils, and kidney beans.   Dairy - Low-fat milk, cheese, and other dairy products. Greek yogurt, kefir, and plant-based milk alternatives like soy milk.   Lean meats, poultry, seafood, and proteins - [place], tuna, cod, and other fish. Shrimp, clams, scallops, and mussels. White meat chicken and turkey, eggs, dried beans, lentils, and tofu. Nuts such as walnuts, almonds, pecans, hazelnuts, cashews, peanuts, and nut butters. Seeds such as pumpkin, sesame, flax, and sunflower seeds.   Fats, oils, and other foods - Foods with healthy fats found in fish, nuts, and avocados. Olive oil or vegetable oils such as canola oil. Use onions, garlic, spices, and herbs to season food.  What foods and drinks should I avoid or limit on a Mediterranean diet? -- A Mediterranean diet involves avoiding or limiting certain types of foods. Try to avoid foods with additives like artificial sweeteners. Avoid foods that are processed, refined, or preserved. These are often foods with a very long shelf life.   Grains to avoid - White bread, pasta, white rice, crackers, and biscuits.   Fruits to avoid - Fruits canned or frozen with extra sugar.   Vegetables to avoid - Commercially prepared potatoes and vegetable mixes, regular canned vegetables and juices, and vegetables frozen with sauce or pickled vegetables.   Dairy to avoid - Whole-fat dairy products like cheese, ice cream, whole milk, cream, and buttermilk.   Lean meats, poultry, seafood, and proteins to avoid - Red meat such as beef, pork, and lamb. Processed meats such as sausages, deli meats, salami, hot dogs, and rose.   Fats, oils, and other foods to avoid - Butter, margarine, lard, gravies, sauces, and salad dressing. Cookies, cakes, candy, doughnuts, muffins, and other sweets.  All topics are updated as new evidence becomes available and our peer review process is complete.  This topic retrieved from [employer] on: Apr 04, 2024.  Topic 218196 Version  2.0  Release: 32.2.4 - C32.93  © 2024 UpToDate, Inc. and/or its affiliates. All rights reserved.  Consumer Information Use and Disclaimer   Disclaimer: This generalized information is a limited summary of diagnosis, treatment, and/or medication information. It is not meant to be comprehensive and should be used as a tool to help the user understand and/or assess potential diagnostic and treatment options. It does NOT include all information about conditions, treatments, medications, side effects, or risks that may apply to a specific patient. It is not intended to be medical advice or a substitute for the medical advice, diagnosis, or treatment of a health care provider based on the health care provider's examination and assessment of a patient's specific and unique circumstances. Patients must speak with a health care provider for complete information about their health, medical questions, and treatment options, including any risks or benefits regarding use of medications. This information does not endorse any treatments or medications as safe, effective, or approved for treating a specific patient. UpToDate, Inc. and its affiliates disclaim any warranty or liability relating to this information or the use thereof.The use of this information is governed by the Terms of Use, available at https://www.woltersAvanserauwer.com/en/know/clinical-effectiveness-terms. 2024© UpToDate, Inc. and its affiliates and/or licensors. All rights reserved.  Copyright   © 2024 UpToDate, Inc. and/or its affiliates. All rights reserved.

## 2024-09-12 ENCOUNTER — TELEPHONE (OUTPATIENT)
Age: 64
End: 2024-09-12

## 2024-09-12 DIAGNOSIS — J20.9 BRONCHOSPASM WITH BRONCHITIS, ACUTE: ICD-10-CM

## 2024-09-12 DIAGNOSIS — R06.2 WHEEZING: ICD-10-CM

## 2024-09-12 DIAGNOSIS — R05.3 CHRONIC COUGH: ICD-10-CM

## 2024-09-12 DIAGNOSIS — J45.21 MILD INTERMITTENT ASTHMA WITH ACUTE EXACERBATION: Primary | ICD-10-CM

## 2024-09-12 RX ORDER — AZITHROMYCIN 250 MG/1
TABLET, FILM COATED ORAL
Qty: 6 TABLET | Refills: 0 | Status: SHIPPED | OUTPATIENT
Start: 2024-09-12 | End: 2024-09-17

## 2024-09-12 NOTE — TELEPHONE ENCOUNTER
"Please inform the wife as follows:     I did not write anything out specifically that we would be doing next in my note, I do not remember exactly what I had mentioned about a week ago.  The next steps would include patient resuming his Flovent to help with the lungs if he has not done so already.  Additionally I have sent over a Z-Herminio to the pharmacy to cover for atypical bacteria or \"walking pneumonia \" before any other medications will be considered, I will need to get a chest x-ray as well to rule out any significant lobar pneumonias.  The patient can perform this outpatient and I will attempt to get back to them once I see the results.  If the Z-Herminio is not helpful, then we will likely need to see the patient back in the office to discuss next steps and imaging.     Mark Anthony Man DO  [facility]  9/12/2024 12:40 PM    "

## 2024-09-12 NOTE — TELEPHONE ENCOUNTER
Pt's wife calling in states her  still is not feeling better with the steroid and is asking for  to send in the next medication he had suggested at their appt, please advise

## 2024-09-12 NOTE — TELEPHONE ENCOUNTER
Patient calls in to follow up on a phone call to the office.   Message below from Dr. Man given to the patient. Patient verbalized understanding.

## 2024-09-24 ENCOUNTER — HOSPITAL ENCOUNTER (OUTPATIENT)
Dept: PULMONOLOGY | Facility: HOSPITAL | Age: 64
Discharge: HOME | End: 2024-09-24
Attending: FAMILY MEDICINE
Payer: COMMERCIAL

## 2024-09-24 DIAGNOSIS — R06.2 WHEEZING: ICD-10-CM

## 2024-09-24 DIAGNOSIS — J45.21 MILD INTERMITTENT ASTHMA WITH ACUTE EXACERBATION: ICD-10-CM

## 2024-09-24 DIAGNOSIS — R05.3 CHRONIC COUGH: ICD-10-CM

## 2024-09-24 PROCEDURE — 94729 DIFFUSING CAPACITY: CPT | Performed by: INTERNAL MEDICINE

## 2024-09-24 PROCEDURE — 94060 EVALUATION OF WHEEZING: CPT | Performed by: INTERNAL MEDICINE

## 2024-09-24 PROCEDURE — 94726 PLETHYSMOGRAPHY LUNG VOLUMES: CPT | Performed by: INTERNAL MEDICINE

## 2024-09-24 PROCEDURE — 94760 N-INVAS EAR/PLS OXIMETRY 1: CPT

## 2024-09-24 PROCEDURE — 94726 PLETHYSMOGRAPHY LUNG VOLUMES: CPT

## 2024-09-24 PROCEDURE — 94729 DIFFUSING CAPACITY: CPT

## 2024-09-24 PROCEDURE — 94060 EVALUATION OF WHEEZING: CPT

## 2024-09-24 RX ORDER — ALBUTEROL SULFATE 0.83 MG/ML
2.5 SOLUTION RESPIRATORY (INHALATION) ONCE
Status: COMPLETED | OUTPATIENT
Start: 2024-09-24 | End: 2024-09-24

## 2024-09-24 RX ADMIN — ALBUTEROL SULFATE 2.5 MG: 2.5 SOLUTION RESPIRATORY (INHALATION) at 12:38

## 2025-03-24 ENCOUNTER — TELEPHONE (OUTPATIENT)
Age: 65
End: 2025-03-24

## 2025-03-24 NOTE — TELEPHONE ENCOUNTER
Patient requesting his Blood work orders to be faxed to [facility] in [place] on [facility] . Going tomorrow morning for the blood work

## 2025-03-26 DIAGNOSIS — D50.9 IRON DEFICIENCY ANEMIA, UNSPECIFIED IRON DEFICIENCY ANEMIA TYPE: ICD-10-CM

## 2025-03-26 DIAGNOSIS — D64.9 LOW HEMOGLOBIN: Primary | ICD-10-CM

## 2025-03-26 LAB
ALBUMIN SERPL-MCNC: 4.2 G/DL (ref 3.9–4.9)
ALP SERPL-CCNC: 66 IU/L (ref 44–121)
ALT SERPL-CCNC: 8 IU/L (ref 0–44)
AST SERPL-CCNC: 15 IU/L (ref 0–40)
BASOPHILS # BLD AUTO: 0.1 X10E3/UL (ref 0–0.2)
BASOPHILS NFR BLD AUTO: 2 %
BILIRUB SERPL-MCNC: 0.7 MG/DL (ref 0–1.2)
BUN SERPL-MCNC: 22 MG/DL (ref 8–27)
BUN/CREAT SERPL: 19 (ref 10–24)
CALCIUM SERPL-MCNC: 9.1 MG/DL (ref 8.6–10.2)
CHLORIDE SERPL-SCNC: 107 MMOL/L (ref 96–106)
CHOLEST SERPL-MCNC: 129 MG/DL (ref 100–199)
CO2 SERPL-SCNC: 23 MMOL/L (ref 20–29)
CREAT SERPL-MCNC: 1.17 MG/DL (ref 0.76–1.27)
EGFR: 70 ML/MIN/1.73
EOSINOPHIL # BLD AUTO: 0.3 X10E3/UL (ref 0–0.4)
EOSINOPHIL NFR BLD AUTO: 5 %
ERYTHROCYTE [DISTWIDTH] IN BLOOD BY AUTOMATED COUNT: 17.9 % (ref 11.6–15.4)
GLOBULIN SER-MCNC: 2.2 G/DL (ref 1.5–4.5)
GLUCOSE SERPL-MCNC: 95 MG/DL (ref 70–99)
HCT VFR BLD AUTO: 30.5 % (ref 37.5–51)
HDLC SERPL-MCNC: 46 MG/DL
HGB BLD-MCNC: 8.2 G/DL (ref 13–17.7)
IMM GRANULOCYTES # BLD: 0 X10E3/UL (ref 0–0.1)
IMM GRANULOCYTES NFR BLD: 0 %
LDLC SERPL CALC-MCNC: 73 MG/DL (ref 0–99)
LYMPHOCYTES # BLD AUTO: 1.6 X10E3/UL (ref 0.7–3.1)
LYMPHOCYTES NFR BLD AUTO: 25 %
MCH RBC QN AUTO: 17.9 PG (ref 26.6–33)
MCHC RBC AUTO-ENTMCNC: 26.9 G/DL (ref 31.5–35.7)
MCV RBC AUTO: 67 FL (ref 79–97)
MONOCYTES # BLD AUTO: 0.6 X10E3/UL (ref 0.1–0.9)
MONOCYTES NFR BLD AUTO: 10 %
NEUTROPHILS # BLD AUTO: 3.7 X10E3/UL (ref 1.4–7)
NEUTROPHILS NFR BLD AUTO: 58 %
PLATELET # BLD AUTO: 369 X10E3/UL (ref 150–450)
POTASSIUM SERPL-SCNC: 4.4 MMOL/L (ref 3.5–5.2)
PROT SERPL-MCNC: 6.4 G/DL (ref 6–8.5)
PSA SERPL-MCNC: 0.4 NG/ML (ref 0–4)
RBC # BLD AUTO: 4.57 X10E6/UL (ref 4.14–5.8)
SL AMB VLDL CHOLESTEROL CALC: 10 MG/DL (ref 5–40)
SODIUM SERPL-SCNC: 144 MMOL/L (ref 134–144)
TRIGL SERPL-MCNC: 43 MG/DL (ref 0–149)
WBC # BLD AUTO: 6.3 X10E3/UL (ref 3.4–10.8)

## 2025-03-26 NOTE — TELEPHONE ENCOUNTER
Patient called in stating [facility] only received the urine test and not the other two tests. Refaxed to [phone].

## 2025-03-26 NOTE — PROGRESS NOTES
Please call patient and inform him that his results came back with a relatively low hemoglobin/hematocrit.  Normal is 12 for men, his is down to 8.  I would not be surprised if he is feeling very fatigued and tired.  I have placed orders for him to follow-up to see if there is somewhere where he was losing blood such as his urine and stool.  If he is feeling very poor then he needs to go directly to the emergency room, they may transfuse and there, I cannot make that promise.  It is possible that the would just recommend him following up with PCP or hematology/oncology.  If he is actively bleeding either in his urine or he is having black, tarry, sticky, or bright red blood in his stool he needs to go to the emergency room.

## 2025-03-27 LAB
APPEARANCE UR: CLEAR
BACTERIA URNS QL MICRO: NORMAL
BILIRUB UR QL STRIP: NEGATIVE
CASTS URNS QL MICRO: NORMAL /LPF
COLOR UR: YELLOW
EPI CELLS #/AREA URNS HPF: NORMAL /HPF (ref 0–10)
FERRITIN SERPL-MCNC: 4 NG/ML (ref 30–400)
GLUCOSE UR QL: NEGATIVE
HGB UR QL STRIP: NEGATIVE
IRON SATN MFR SERPL: 5 % (ref 15–55)
IRON SERPL-MCNC: 22 UG/DL (ref 38–169)
KETONES UR QL STRIP: NEGATIVE
LEUKOCYTE ESTERASE UR QL STRIP: NEGATIVE
MICRO URNS: NORMAL
MICRO URNS: NORMAL
NITRITE UR QL STRIP: NEGATIVE
PH UR STRIP: 6.5 [PH] (ref 5–7.5)
PROT UR QL STRIP: NEGATIVE
RBC #/AREA URNS HPF: NORMAL /HPF (ref 0–2)
SP GR UR: 1.01 (ref 1–1.03)
TIBC SERPL-MCNC: 443 UG/DL (ref 250–450)
UIBC SERPL-MCNC: 421 UG/DL (ref 111–343)
UROBILINOGEN UR STRIP-ACNC: 0.2 MG/DL (ref 0.2–1)
WBC #/AREA URNS HPF: NORMAL /HPF (ref 0–5)

## 2025-03-30 ENCOUNTER — RESULTS FOLLOW-UP (OUTPATIENT)
Dept: FAMILY MEDICINE CLINIC | Facility: CLINIC | Age: 65
End: 2025-03-30

## 2025-04-02 ENCOUNTER — OFFICE VISIT (OUTPATIENT)
Dept: FAMILY MEDICINE CLINIC | Facility: CLINIC | Age: 65
End: 2025-04-02
Payer: COMMERCIAL

## 2025-04-02 VITALS
BODY MASS INDEX: 24.38 KG/M2 | OXYGEN SATURATION: 98 % | HEART RATE: 63 BPM | HEIGHT: 74 IN | SYSTOLIC BLOOD PRESSURE: 100 MMHG | WEIGHT: 190 LBS | DIASTOLIC BLOOD PRESSURE: 60 MMHG

## 2025-04-02 DIAGNOSIS — D64.9 LOW HEMOGLOBIN: ICD-10-CM

## 2025-04-02 DIAGNOSIS — E61.1 LOW SERUM IRON: ICD-10-CM

## 2025-04-02 DIAGNOSIS — D50.9 IRON DEFICIENCY ANEMIA, UNSPECIFIED IRON DEFICIENCY ANEMIA TYPE: ICD-10-CM

## 2025-04-02 DIAGNOSIS — R53.83 OTHER FATIGUE: ICD-10-CM

## 2025-04-02 DIAGNOSIS — R23.1 PALLOR: ICD-10-CM

## 2025-04-02 DIAGNOSIS — Z00.00 ANNUAL PHYSICAL EXAM: Primary | ICD-10-CM

## 2025-04-02 DIAGNOSIS — J45.21 MILD INTERMITTENT ASTHMA WITH ACUTE EXACERBATION: ICD-10-CM

## 2025-04-02 PROBLEM — N52.9 ERECTILE DYSFUNCTION: Status: RESOLVED | Noted: 2017-10-03 | Resolved: 2025-04-02

## 2025-04-02 PROCEDURE — 99396 PREV VISIT EST AGE 40-64: CPT | Performed by: FAMILY MEDICINE

## 2025-04-02 RX ORDER — FERROUS SULFATE 324(65)MG
324 TABLET, DELAYED RELEASE (ENTERIC COATED) ORAL
Qty: 90 TABLET | Refills: 0 | Status: SHIPPED | OUTPATIENT
Start: 2025-04-02

## 2025-04-02 NOTE — PATIENT INSTRUCTIONS
"Patient Education     Routine physical for adults   The Basics   Written by the doctors and editors at [facility]   What is a physical? -- A physical is a routine visit, or \"check-up,\" with your doctor. You might also hear it called a \"wellness visit\" or \"preventive visit.\"  During each visit, the doctor will:   Ask about your physical and mental health   Ask about your habits, behaviors, and lifestyle   Do an exam   Give you vaccines if needed   Talk to you about any medicines you take   Give advice about your health   Answer your questions  Getting regular check-ups is an important part of taking care of your health. It can help your doctor find and treat any problems you have. But it's also important for preventing health problems.  A routine physical is different from a \"sick visit.\" A sick visit is when you see a doctor because of a health concern or problem. Since physicals are scheduled ahead of time, you can think about what you want to ask the doctor.  How often should I get a physical? -- It depends on your age and health. In general, for people age 21 years and older:   If you are younger than 50 years, you might be able to get a physical every 3 years.   If you are 50 years or older, your doctor might recommend a physical every year.  If you have an ongoing health condition, like diabetes or high blood pressure, your doctor will probably want to see you more often.  What happens during a physical? -- In general, each visit will include:   Physical exam - The doctor or nurse will check your height, weight, heart rate, and blood pressure. They will also look at your eyes and ears. They will ask about how you are feeling and whether you have any symptoms that bother you.   Medicines - It's a good idea to bring a list of all the medicines you take to each doctor visit. Your doctor will talk to you about your medicines and answer any questions. Tell them if you are having any side effects that bother you. You " "should also tell them if you are having trouble paying for any of your medicines.   Habits and behaviors - This includes:   Your diet   Your exercise habits   Whether you smoke, drink alcohol, or use drugs   Whether you are sexually active   Whether you feel safe at home  Your doctor will talk to you about things you can do to improve your health and lower your risk of health problems. They will also offer help and support. For example, if you want to quit smoking, they can give you advice and might prescribe medicines. If you want to improve your diet or get more physical activity, they can help you with this, too.   Lab tests, if needed - The tests you get will depend on your age and situation. For example, your doctor might want to check your:   Cholesterol   Blood sugar   Iron level   Vaccines - The recommended vaccines will depend on your age, health, and what vaccines you already had. Vaccines are very important because they can prevent certain serious or deadly infections.   Discussion of screening - \"Screening\" means checking for diseases or other health problems before they cause symptoms. Your doctor can recommend screening based on your age, risk, and preferences. This might include tests to check for:   Cancer, such as breast, prostate, cervical, ovarian, colorectal, prostate, lung, or skin cancer   Sexually transmitted infections, such as chlamydia and gonorrhea   Mental health conditions like depression and anxiety  Your doctor will talk to you about the different types of screening tests. They can help you decide which screenings to have. They can also explain what the results might mean.   Answering questions - The physical is a good time to ask the doctor or nurse questions about your health. If needed, they can refer you to other doctors or specialists, too.  Adults older than 65 years often need other care, too. As you get older, your doctor will talk to you about:   How to prevent falling at " home   Hearing or vision tests   Memory testing   How to take your medicines safely   Making sure that you have the help and support you need at home  All topics are updated as new evidence becomes available and our peer review process is complete.  This topic retrieved from [employer] on: May 02, 2024.  Topic 180429 Version 1.0  Release: 32.4.3 - C32.122  © 2024 UpToDate, Inc. and/or its affiliates. All rights reserved.  Consumer Information Use and Disclaimer   Disclaimer: This generalized information is a limited summary of diagnosis, treatment, and/or medication information. It is not meant to be comprehensive and should be used as a tool to help the user understand and/or assess potential diagnostic and treatment options. It does NOT include all information about conditions, treatments, medications, side effects, or risks that may apply to a specific patient. It is not intended to be medical advice or a substitute for the medical advice, diagnosis, or treatment of a health care provider based on the health care provider's examination and assessment of a patient's specific and unique circumstances. Patients must speak with a health care provider for complete information about their health, medical questions, and treatment options, including any risks or benefits regarding use of medications. This information does not endorse any treatments or medications as safe, effective, or approved for treating a specific patient. UpToDate, Inc. and its affiliates disclaim any warranty or liability relating to this information or the use thereof.The use of this information is governed by the Terms of Use, available at https://www.[employer].com/en/know/clinical-effectiveness-terms. 2024© UpToDate, Inc. and its affiliates and/or licensors. All rights reserved.  Copyright   © 2024 UpToDate, Inc. and/or its affiliates. All rights reserved.

## 2025-04-02 NOTE — ASSESSMENT & PLAN NOTE
Patient has intermittent symptoms typically with illness or changes in temperature.  He has not had any exacerbations recently, but has Ventolin and Flovent if needed.

## 2025-04-02 NOTE — PROGRESS NOTES
Adult Annual Physical  Name: Ramsey Rodgers      : 1960      MRN: [id number]  Encounter Provider: Mark Anthony Man DO  Encounter Date: 2025   Encounter department: [facility]    :  Assessment & Plan  Annual physical exam  Patient presents today for annual physical.  Other than his severe iron deficiency/low hemoglobin the patient is doing overall well.  We are still waiting for results from his Hemoccult, once resulted, we will determine the next steps for the patient's treatment.       Iron deficiency anemia, unspecified iron deficiency anemia type  Patient has iron deficiency anemia with low hemoglobin and last CBC being around 8.  We discussed the different options for evaluation and treatment.  He feels that the low iron may be dietary since he has greatly reduced the amount of his oral intake of higher iron-containing foods such as red meat.  At this time I recommend that we start the patient on IV iron and he can utilize oral iron until the patient is set up for infusions.  At the end of the infusion/possibly in the next 6 to 8 weeks we will consider following up labs.  I would like to have the majority of the infusions and forming this.  We also need to find where the patient may be losing blood.  Orders:    ferrous sulfate 324 (65 Fe) mg; Take 1 tablet (324 mg total) by mouth daily before breakfast    Low hemoglobin  See iron deficiency anemia  Orders:    ferrous sulfate 324 (65 Fe) mg; Take 1 tablet (324 mg total) by mouth daily before breakfast    Low serum iron  See iron deficiency anemia  Orders:    ferrous sulfate 324 (65 Fe) mg; Take 1 tablet (324 mg total) by mouth daily before breakfast    Other fatigue  Fatigue is likely associated with his low blood counts.  I recommended that the patient start oral iron.  Will set up infusions.  Orders:    ferrous sulfate 324 (65 Fe) mg; Take 1 tablet (324 mg total) by mouth daily before breakfast    Mild intermittent asthma  with acute exacerbation  Patient has intermittent symptoms typically with illness or changes in temperature.  He has not had any exacerbations recently, but has Ventolin and Flovent if needed.           Preventive Screenings:    - Prostate cancer screening: screening up-to-date     Immunizations:  - Immunizations due: Prevnar 20, Tdap and Zoster (Shingrix)         History of Present Illness     Adult Annual Physical:  Patient presents for annual physical. Patient presents today for annual physical.  Since obtaining labs for regular yearly evaluation, the patient has a significant reduction in his hemoglobin.  It was down to the 8 range.  Patient does admit that he has been dragging over the course of the last several months especially with his workload.  Although he has been able to enjoy some books, he has been reducing his work load from 12 hours a day to less than 8 hours a day.  This is bothering him, where he usually is much more energetic and active.  He also noted after taking walks 2 miles, he is having increased tiredness and fatigue to where he needs to take a nap.  This is abnormal for him, his endurance was much better over 6 months ago..     Diet and Physical Activity:  - Diet/Nutrition: portion control, intermittent fasting and consuming 3-5 servings of fruits/vegetables daily. intentional weight loss of 10 lbs.  - Exercise: walking. 2 mile walks.    Depression Screening:  - PHQ-2 Score: 0    General Health:  - Sleep: 7-8 hours of sleep on average.  - Hearing: normal hearing right ear and normal hearing left ear.  - Vision: wears glasses.  - Dental: no dental visits for > 1 year and brushes teeth once daily.    /GYN Health:  - Follows with GYN: no.   - History of STDs: no    Advanced Care Planning:  - Has an advanced directive?: no    - Has a durable medical POA?: no      Review of Systems   Constitutional:  Positive for fatigue. Negative for chills and fever.   HENT:  Negative for congestion, ear  "discharge, ear pain, hearing loss, postnasal drip, rhinorrhea, sinus pressure, sinus pain and sore throat.    Eyes:  Negative for visual disturbance.   Respiratory:  Negative for cough, chest tightness and shortness of breath.    Cardiovascular:  Negative for chest pain and palpitations.   Gastrointestinal:  Positive for nausea. Negative for abdominal pain, blood in stool (no visible blood known), constipation, diarrhea and vomiting.   Genitourinary:  Negative for dysuria and frequency.   Neurological:  Positive for dizziness and light-headedness. Negative for headaches.   Psychiatric/Behavioral:  Negative for self-injury and suicidal ideas.      Medical History Reviewed by provider this encounter:  Tobacco  Allergies  Meds  Problems  Med Hx  Surg Hx  Fam Hx  Soc   Hx    .  Current Outpatient Medications on File Prior to Visit   Medication Sig Dispense Refill    albuterol (Ventolin HFA) 90 mcg/act inhaler Inhale 2 puffs every 4 (four) hours as needed for wheezing 18 g 5    fluticasone (FLOVENT HFA) 110 MCG/ACT inhaler Inhale 2 puffs 2 (two) times a day Rinse mouth after use. 12 g 5     No current facility-administered medications on file prior to visit.      Social History     Tobacco Use    Smoking status: Never    Smokeless tobacco: Never   Vaping Use    Vaping status: Never Used   Substance and Sexual Activity    Alcohol use: No    Drug use: No    Sexual activity: Not Currently     Partners: Female       Objective   /60   Pulse 63   Ht 6' 2\" (1.88 m)   Wt 86.2 kg (190 lb)   SpO2 98%   BMI 24.39 kg/m²     Physical Exam  Constitutional:       General: He is not in acute distress.     Appearance: He is normal weight. He is not ill-appearing, toxic-appearing or diaphoretic.      Comments: Pallorous, almost jaundiced looking.  Patient is able to speak in full sentences and is alert and oriented.                          HENT:      Head: Normocephalic and atraumatic.      Right Ear: Tympanic membrane, " ear canal and external ear normal. There is no impacted cerumen.      Left Ear: Tympanic membrane, ear canal and external ear normal. There is no impacted cerumen.      Nose: Nose normal. No congestion or rhinorrhea.      Mouth/Throat:      Mouth: Mucous membranes are moist.      Pharynx: Oropharynx is clear. No oropharyngeal exudate or posterior oropharyngeal erythema.   Eyes:      General:         Right eye: No discharge.         Left eye: No discharge.      Pupils: Pupils are equal, round, and reactive to light.   Cardiovascular:      Rate and Rhythm: Normal rate and regular rhythm.      Heart sounds: Normal heart sounds. No murmur heard.     No friction rub. No gallop.   Pulmonary:      Effort: Pulmonary effort is normal. No respiratory distress.      Breath sounds: Normal breath sounds. No stridor. No wheezing, rhonchi or rales.   Chest:      Chest wall: No tenderness.   Abdominal:      General: Bowel sounds are normal. There is no distension.      Palpations: Abdomen is soft.      Tenderness: There is no abdominal tenderness.   Skin:     General: Skin is warm.      Capillary Refill: Capillary refill takes less than 2 seconds.   Neurological:      Mental Status: He is alert.

## 2025-04-03 ENCOUNTER — RESULTS FOLLOW-UP (OUTPATIENT)
Dept: FAMILY MEDICINE CLINIC | Facility: CLINIC | Age: 65
End: 2025-04-03

## 2025-04-03 DIAGNOSIS — Z12.11 SCREENING FOR COLON CANCER: ICD-10-CM

## 2025-04-03 DIAGNOSIS — D64.9 LOW HEMOGLOBIN: ICD-10-CM

## 2025-04-03 DIAGNOSIS — R19.5 POSITIVE OCCULT STOOL BLOOD TEST: Primary | ICD-10-CM

## 2025-04-03 LAB
HEMOCCULT STL QL IA: POSITIVE

## 2025-04-03 NOTE — TELEPHONE ENCOUNTER
Patient was seen in the office today.  We reviewed labs.  Stool stables were not resulted.  Will follow up with patient and will determine next steps depending on results.  We discussed today in office.      DO [facility]  4/2/2025 9:16 PM

## 2025-04-03 NOTE — RESULT ENCOUNTER NOTE
Dear Dr. Ross,     I have placed a referral for this patient.  I recently obtained labs and he was found to have a hemogklobin of 8.9 in a male.  I was concerned for possible slow leak in stool and with three positive occults I feel that this is the source along with dietary restriction.  Can we please get this patient in ASAP for colonoscopy to see what may be causing the leak.  Thank you.     Sincerely,     Mark Anthony

## 2025-04-06 PROBLEM — E61.1 LOW SERUM IRON: Status: ACTIVE | Noted: 2025-04-06

## 2025-04-06 PROBLEM — D64.9 LOW HEMOGLOBIN: Status: ACTIVE | Noted: 2025-04-06

## 2025-04-06 PROBLEM — R23.1 PALLOR: Status: ACTIVE | Noted: 2025-04-06

## 2025-04-06 PROBLEM — R53.83 OTHER FATIGUE: Status: ACTIVE | Noted: 2025-04-06

## 2025-04-06 PROBLEM — D50.9 IRON DEFICIENCY ANEMIA: Status: ACTIVE | Noted: 2025-04-06

## 2025-04-06 RX ORDER — SODIUM CHLORIDE 9 MG/ML
20 INJECTION, SOLUTION INTRAVENOUS ONCE
Status: CANCELLED | OUTPATIENT
Start: 2025-04-18

## 2025-04-06 NOTE — ASSESSMENT & PLAN NOTE
Patient has iron deficiency anemia with low hemoglobin and last CBC being around 8.  We discussed the different options for evaluation and treatment.  He feels that the low iron may be dietary since he has greatly reduced the amount of his oral intake of higher iron-containing foods such as red meat.  At this time I recommend that we start the patient on IV iron and he can utilize oral iron until the patient is set up for infusions.  At the end of the infusion/possibly in the next 6 to 8 weeks we will consider following up labs.  I would like to have the majority of the infusions and forming this.  We also need to find where the patient may be losing blood.  Orders:    ferrous sulfate 324 (65 Fe) mg; Take 1 tablet (324 mg total) by mouth daily before breakfast

## 2025-04-06 NOTE — ASSESSMENT & PLAN NOTE
See iron deficiency anemia  Orders:    ferrous sulfate 324 (65 Fe) mg; Take 1 tablet (324 mg total) by mouth daily before breakfast

## 2025-04-06 NOTE — ASSESSMENT & PLAN NOTE
Fatigue is likely associated with his low blood counts.  I recommended that the patient start oral iron.  Will set up infusions.  Orders:    ferrous sulfate 324 (65 Fe) mg; Take 1 tablet (324 mg total) by mouth daily before breakfast

## 2025-04-07 ENCOUNTER — TELEPHONE (OUTPATIENT)
Dept: GASTROENTEROLOGY | Facility: CLINIC | Age: 65
End: 2025-04-07

## 2025-04-07 NOTE — TELEPHONE ENCOUNTER
JUSTINA Suggs MA Hugh Joseph Prentice, DO to Duc Ross MD      4/4/25  4:53 PM   No problem.  I usually do, thank you for catching that I will make sure it goes staff message next time.  Thank you.    Sincerely,    Mark Anthony Ross MD to Mark Anthony Man DO · Me      4/4/25  4:52 PM   Thanks Mark Anthony.  This message came as a results note instead of staff message.  Could you please send as staff messages next time to get the immediate attention    Anne-can you please schedule him for office visit with me on the 10th and you can overbook me  Mark Anthony Man DO to Duc Ross MD        4/3/25  9:46 AM  Note    Dear Dr. Ross,     I have placed a referral for this patient.  I recently obtained labs and he was found to have a hemogklobin of 8.9 in a male.  I was concerned for possible slow leak in stool and with three positive occults I feel that this is the source along with dietary restriction.  Can we please get this patient in ASAP for colonoscopy to see what may be causing the leak.  Thank you.     Sincerely,     Mark Anthony

## 2025-04-10 ENCOUNTER — TELEPHONE (OUTPATIENT)
Age: 65
End: 2025-04-10

## 2025-04-10 ENCOUNTER — OFFICE VISIT (OUTPATIENT)
Dept: GASTROENTEROLOGY | Facility: AMBULARY SURGERY CENTER | Age: 65
End: 2025-04-10
Payer: COMMERCIAL

## 2025-04-10 VITALS
OXYGEN SATURATION: 98 % | HEIGHT: 74 IN | BODY MASS INDEX: 24.74 KG/M2 | DIASTOLIC BLOOD PRESSURE: 72 MMHG | SYSTOLIC BLOOD PRESSURE: 114 MMHG | WEIGHT: 192.8 LBS | HEART RATE: 71 BPM

## 2025-04-10 DIAGNOSIS — D50.8 OTHER IRON DEFICIENCY ANEMIA: ICD-10-CM

## 2025-04-10 DIAGNOSIS — R19.5 POSITIVE OCCULT STOOL BLOOD TEST: Primary | ICD-10-CM

## 2025-04-10 PROCEDURE — 99204 OFFICE O/P NEW MOD 45 MIN: CPT | Performed by: INTERNAL MEDICINE

## 2025-04-10 RX ORDER — SODIUM PICOSULFATE, MAGNESIUM OXIDE, AND ANHYDROUS CITRIC ACID 12; 3.5; 1 G/175ML; G/175ML; MG/175ML
LIQUID ORAL
Qty: 350 ML | Refills: 0 | Status: SHIPPED | OUTPATIENT
Start: 2025-04-10

## 2025-04-10 RX ORDER — SODIUM CHLORIDE, SODIUM LACTATE, POTASSIUM CHLORIDE, CALCIUM CHLORIDE 600; 310; 30; 20 MG/100ML; MG/100ML; MG/100ML; MG/100ML
125 INJECTION, SOLUTION INTRAVENOUS CONTINUOUS
OUTPATIENT
Start: 2025-04-10

## 2025-04-10 NOTE — TELEPHONE ENCOUNTER
Nawaf is calling in to have the provider schedule his iron infusion for his anemia.  He was informed that Dr Man would schedule them for him    Please advise

## 2025-04-10 NOTE — H&P (VIEW-ONLY)
Name: Ramsey Rodgers      : 1960      MRN: [id number]  Encounter Provider: Duc Ross MD  Encounter Date: 4/10/2025   Encounter department: [facility]  :  Assessment & Plan  Positive occult stool blood test    Patient with findings since anemia noted to have stool occult blood positive.  Rule out peptic ulcer disease or malignancy.    -Continue with iron supplements and consider infusions.    -Schedule for both EGD and colonoscopy.    -High-fiber diet.    -Patient was given instructions about the colonoscopy prep.    -Patient was explained about the risks and benefits of the procedure. Risks including but not limited to bleeding, infection, perforation were explained in detail. Also explained about less than 100% sensitivity with the exam and other alternatives.    Orders:    Ambulatory Referral to Gastroenterology    sodium picosulfate, magnesium oxide, citric acid (Clenpiq) oral solution; Take 175 mL (1 bottle) the evening before the colonoscopy, between 5 PM and 9 PM, followed by a second 175 mL bottle 5 hours before the colonoscopy.    Colonoscopy; Future    EGD; Future    Other iron deficiency anemia    -Treatment plan as described above    Orders:    Colonoscopy; Future    EGD; Future        History of Present Illness   Ramsey Rodgers is a 64 y.o. male who presents because of anemia.  Patient was diagnosed with severe iron deficiency anemia with a hemoglobin of 8.2 on routine lab workup with a low iron indices.  Stool for blood came back as positive.  Reports having regular bowel movements and denies any blood or mucus in the stool.  He reports feeling fatigued and lightheaded at times.  He lost about 40 pounds in the past year with diet control.  No abdominal pain, nausea or vomiting.  Denies any heartburn or acid reflux.  Denies any difficulty swallowing.    Chaperon: Ms. English    Anemia  Symptoms include light-headedness. There has been no bruising/bleeding  easily, confusion, fever or palpitations.       Review of Systems   Constitutional:  Positive for fatigue and unexpected weight change. Negative for activity change, appetite change, chills, diaphoresis and fever.   HENT:  Negative for ear discharge, ear pain, facial swelling, hearing loss, nosebleeds, sore throat, tinnitus and voice change.    Eyes:  Negative for pain, discharge, redness, itching and visual disturbance.   Respiratory:  Negative for apnea, cough, chest tightness, shortness of breath and wheezing.    Cardiovascular:  Negative for chest pain and palpitations.   Gastrointestinal:         As noted in HPI   Endocrine: Negative for cold intolerance, heat intolerance and polyuria.   Genitourinary:  Negative for difficulty urinating, dysuria, flank pain, hematuria and urgency.   Musculoskeletal:  Negative for arthralgias, back pain, gait problem, joint swelling and myalgias.   Skin:  Negative for rash and wound.   Neurological:  Positive for light-headedness. Negative for dizziness, tremors, seizures, speech difficulty, numbness and headaches.   Hematological:  Negative for adenopathy. Does not bruise/bleed easily.   Psychiatric/Behavioral:  Negative for agitation, behavioral problems and confusion. The patient is not nervous/anxious.       Pertinent Medical History            Medical History Reviewed by provider this encounter:     .  Past Medical History   Past Medical History:   Diagnosis Date    Bronchospasm with bronchitis, acute 7/2/2018    Community acquired pneumonia of left lower lobe of lung 2/12/2018    Impacted cerumen of left ear 1/20/2020    Influenza 1/31/2018     Past Surgical History:   Procedure Laterality Date    DENTAL SURGERY      HERNIA REPAIR      SKIN LESION EXCISION      benign     Family History   Problem Relation Age of Onset    Diabetes type I Mother     Prostate cancer Father     Coronary artery disease Maternal Grandfather     Lung cancer Paternal Grandmother     Cancer  "Paternal Grandfather     Colon cancer Neg Hx     Testicular cancer Neg Hx       reports that he has never smoked. He has never used smokeless tobacco. He reports that he does not drink alcohol and does not use drugs.  Current Outpatient Medications   Medication Instructions    albuterol (Ventolin HFA) 90 mcg/act inhaler 2 puffs, Inhalation, Every 4 hours PRN    ferrous sulfate 324 mg, Oral, Daily before breakfast    fluticasone (FLOVENT HFA) 110 MCG/ACT inhaler 2 puffs, Inhalation, 2 times daily, Rinse mouth after use.    sodium picosulfate, magnesium oxide, citric acid (Clenpiq) oral solution Take 175 mL (1 bottle) the evening before the colonoscopy, between 5 PM and 9 PM, followed by a second 175 mL bottle 5 hours before the colonoscopy.     Allergies   Allergen Reactions    Amoxicillin Hives    Penicillins Hives     ? Amoxicillian      Current Outpatient Medications on File Prior to Visit   Medication Sig Dispense Refill    albuterol (Ventolin HFA) 90 mcg/act inhaler Inhale 2 puffs every 4 (four) hours as needed for wheezing 18 g 5    ferrous sulfate 324 (65 Fe) mg Take 1 tablet (324 mg total) by mouth daily before breakfast 90 tablet 0    fluticasone (FLOVENT HFA) 110 MCG/ACT inhaler Inhale 2 puffs 2 (two) times a day Rinse mouth after use. 12 g 5     No current facility-administered medications on file prior to visit.      Social History     Tobacco Use    Smoking status: Never    Smokeless tobacco: Never   Vaping Use    Vaping status: Never Used   Substance and Sexual Activity    Alcohol use: No    Drug use: No    Sexual activity: Not Currently     Partners: Female        Objective   /72 (BP Location: Left arm, Patient Position: Sitting, Cuff Size: Standard)   Pulse 71   Ht 6' 2\" (1.88 m)   Wt 87.5 kg (192 lb 12.8 oz)   SpO2 98%   BMI 24.75 kg/m²     Physical Exam  Constitutional:       Appearance: He is well-developed.   HENT:      Head: Normocephalic and atraumatic.   Eyes:      General: No " scleral icterus.        Right eye: No discharge.         Left eye: No discharge.      Conjunctiva/sclera: Conjunctivae normal.      Pupils: Pupils are equal, round, and reactive to light.   Neck:      Thyroid: No thyromegaly.      Vascular: No JVD.      Trachea: No tracheal deviation.   Cardiovascular:      Rate and Rhythm: Normal rate and regular rhythm.      Heart sounds: Normal heart sounds. No murmur heard.     No friction rub. No gallop.   Pulmonary:      Effort: Pulmonary effort is normal. No respiratory distress.      Breath sounds: Normal breath sounds. No wheezing or rales.   Chest:      Chest wall: No tenderness.   Abdominal:      General: Bowel sounds are normal. There is no distension.      Palpations: Abdomen is soft. There is no mass.      Tenderness: There is no abdominal tenderness. There is no guarding or rebound.      Hernia: No hernia is present.   Musculoskeletal:      Cervical back: Neck supple.   Lymphadenopathy:      Cervical: No cervical adenopathy.   Skin:     General: Skin is warm and dry.      Findings: No erythema or rash.   Neurological:      Mental Status: He is alert and oriented to person, place, and time.   Psychiatric:         Behavior: Behavior normal.         Thought Content: Thought content normal.          Lab Results: I personally reviewed relevant lab results.

## 2025-04-10 NOTE — PATIENT INSTRUCTIONS
Scheduled date of EGD/colonoscopy (as of today): 4/22/25   Physician performing EGD/colonoscopy: Dr. Ross  Location of EGD/colonoscopy: [facility]  Desired bowel prep reviewed with patient: Clenpiq  Instructions reviewed with patient by: Mckayla JOHNSON  Clearances: jenn

## 2025-04-10 NOTE — PROGRESS NOTES
Name: Ramsey Rodgers      : 1960      MRN: [id number]  Encounter Provider: Duc Ross MD  Encounter Date: 4/10/2025   Encounter department: [facility] [place]  :  Assessment & Plan  Positive occult stool blood test    Patient with findings since anemia noted to have stool occult blood positive.  Rule out peptic ulcer disease or malignancy.    -Continue with iron supplements and consider infusions.    -Schedule for both EGD and colonoscopy.    -High-fiber diet.    -Patient was given instructions about the colonoscopy prep.    -Patient was explained about the risks and benefits of the procedure. Risks including but not limited to bleeding, infection, perforation were explained in detail. Also explained about less than 100% sensitivity with the exam and other alternatives.    Orders:    Ambulatory Referral to Gastroenterology    sodium picosulfate, magnesium oxide, citric acid (Clenpiq) oral solution; Take 175 mL (1 bottle) the evening before the colonoscopy, between 5 PM and 9 PM, followed by a second 175 mL bottle 5 hours before the colonoscopy.    Colonoscopy; Future    EGD; Future    Other iron deficiency anemia    -Treatment plan as described above    Orders:    Colonoscopy; Future    EGD; Future        History of Present Illness   Ramsey Rodgers is a 64 y.o. male who presents because of anemia.  Patient was diagnosed with severe iron deficiency anemia with a hemoglobin of 8.2 on routine lab workup with a low iron indices.  Stool for blood came back as positive.  Reports having regular bowel movements and denies any blood or mucus in the stool.  He reports feeling fatigued and lightheaded at times.  He lost about 40 pounds in the past year with diet control.  No abdominal pain, nausea or vomiting.  Denies any heartburn or acid reflux.  Denies any difficulty swallowing.    Chaperon: Ms. English    Anemia  Symptoms include light-headedness. There has been no bruising/bleeding  easily, confusion, fever or palpitations.       Review of Systems   Constitutional:  Positive for fatigue and unexpected weight change. Negative for activity change, appetite change, chills, diaphoresis and fever.   HENT:  Negative for ear discharge, ear pain, facial swelling, hearing loss, nosebleeds, sore throat, tinnitus and voice change.    Eyes:  Negative for pain, discharge, redness, itching and visual disturbance.   Respiratory:  Negative for apnea, cough, chest tightness, shortness of breath and wheezing.    Cardiovascular:  Negative for chest pain and palpitations.   Gastrointestinal:         As noted in HPI   Endocrine: Negative for cold intolerance, heat intolerance and polyuria.   Genitourinary:  Negative for difficulty urinating, dysuria, flank pain, hematuria and urgency.   Musculoskeletal:  Negative for arthralgias, back pain, gait problem, joint swelling and myalgias.   Skin:  Negative for rash and wound.   Neurological:  Positive for light-headedness. Negative for dizziness, tremors, seizures, speech difficulty, numbness and headaches.   Hematological:  Negative for adenopathy. Does not bruise/bleed easily.   Psychiatric/Behavioral:  Negative for agitation, behavioral problems and confusion. The patient is not nervous/anxious.       Pertinent Medical History            Medical History Reviewed by provider this encounter:     .  Past Medical History   Past Medical History:   Diagnosis Date    Bronchospasm with bronchitis, acute 7/2/2018    Community acquired pneumonia of left lower lobe of lung 2/12/2018    Impacted cerumen of left ear 1/20/2020    Influenza 1/31/2018     Past Surgical History:   Procedure Laterality Date    DENTAL SURGERY      HERNIA REPAIR      SKIN LESION EXCISION      benign     Family History   Problem Relation Age of Onset    Diabetes type I Mother     Prostate cancer Father     Coronary artery disease Maternal Grandfather     Lung cancer Paternal Grandmother     Cancer  "Paternal Grandfather     Colon cancer Neg Hx     Testicular cancer Neg Hx       reports that he has never smoked. He has never used smokeless tobacco. He reports that he does not drink alcohol and does not use drugs.  Current Outpatient Medications   Medication Instructions    albuterol (Ventolin HFA) 90 mcg/act inhaler 2 puffs, Inhalation, Every 4 hours PRN    ferrous sulfate 324 mg, Oral, Daily before breakfast    fluticasone (FLOVENT HFA) 110 MCG/ACT inhaler 2 puffs, Inhalation, 2 times daily, Rinse mouth after use.    sodium picosulfate, magnesium oxide, citric acid (Clenpiq) oral solution Take 175 mL (1 bottle) the evening before the colonoscopy, between 5 PM and 9 PM, followed by a second 175 mL bottle 5 hours before the colonoscopy.     Allergies   Allergen Reactions    Amoxicillin Hives    Penicillins Hives     ? Amoxicillian      Current Outpatient Medications on File Prior to Visit   Medication Sig Dispense Refill    albuterol (Ventolin HFA) 90 mcg/act inhaler Inhale 2 puffs every 4 (four) hours as needed for wheezing 18 g 5    ferrous sulfate 324 (65 Fe) mg Take 1 tablet (324 mg total) by mouth daily before breakfast 90 tablet 0    fluticasone (FLOVENT HFA) 110 MCG/ACT inhaler Inhale 2 puffs 2 (two) times a day Rinse mouth after use. 12 g 5     No current facility-administered medications on file prior to visit.      Social History     Tobacco Use    Smoking status: Never    Smokeless tobacco: Never   Vaping Use    Vaping status: Never Used   Substance and Sexual Activity    Alcohol use: No    Drug use: No    Sexual activity: Not Currently     Partners: Female        Objective   /72 (BP Location: Left arm, Patient Position: Sitting, Cuff Size: Standard)   Pulse 71   Ht 6' 2\" (1.88 m)   Wt 87.5 kg (192 lb 12.8 oz)   SpO2 98%   BMI 24.75 kg/m²     Physical Exam  Constitutional:       Appearance: He is well-developed.   HENT:      Head: Normocephalic and atraumatic.   Eyes:      General: No " scleral icterus.        Right eye: No discharge.         Left eye: No discharge.      Conjunctiva/sclera: Conjunctivae normal.      Pupils: Pupils are equal, round, and reactive to light.   Neck:      Thyroid: No thyromegaly.      Vascular: No JVD.      Trachea: No tracheal deviation.   Cardiovascular:      Rate and Rhythm: Normal rate and regular rhythm.      Heart sounds: Normal heart sounds. No murmur heard.     No friction rub. No gallop.   Pulmonary:      Effort: Pulmonary effort is normal. No respiratory distress.      Breath sounds: Normal breath sounds. No wheezing or rales.   Chest:      Chest wall: No tenderness.   Abdominal:      General: Bowel sounds are normal. There is no distension.      Palpations: Abdomen is soft. There is no mass.      Tenderness: There is no abdominal tenderness. There is no guarding or rebound.      Hernia: No hernia is present.   Musculoskeletal:      Cervical back: Neck supple.   Lymphadenopathy:      Cervical: No cervical adenopathy.   Skin:     General: Skin is warm and dry.      Findings: No erythema or rash.   Neurological:      Mental Status: He is alert and oriented to person, place, and time.   Psychiatric:         Behavior: Behavior normal.         Thought Content: Thought content normal.          Lab Results: I personally reviewed relevant lab results.

## 2025-04-16 ENCOUNTER — ANESTHESIA EVENT (OUTPATIENT)
Dept: ANESTHESIOLOGY | Facility: HOSPITAL | Age: 65
End: 2025-04-16

## 2025-04-16 ENCOUNTER — TELEPHONE (OUTPATIENT)
Dept: INFUSION CENTER | Facility: CLINIC | Age: 65
End: 2025-04-16

## 2025-04-16 ENCOUNTER — ANESTHESIA (OUTPATIENT)
Dept: ANESTHESIOLOGY | Facility: HOSPITAL | Age: 65
End: 2025-04-16

## 2025-04-16 NOTE — TELEPHONE ENCOUNTER
Placed call to the patient to review New Patient information for the [facility]. Aware of our location, policies, and what to expect during his time here. All questions answered.

## 2025-04-18 ENCOUNTER — HOSPITAL ENCOUNTER (OUTPATIENT)
Dept: INFUSION CENTER | Facility: CLINIC | Age: 65
End: 2025-04-18
Attending: FAMILY MEDICINE
Payer: COMMERCIAL

## 2025-04-18 VITALS
OXYGEN SATURATION: 98 % | DIASTOLIC BLOOD PRESSURE: 64 MMHG | SYSTOLIC BLOOD PRESSURE: 104 MMHG | TEMPERATURE: 97.7 F | HEART RATE: 61 BPM

## 2025-04-18 DIAGNOSIS — R53.83 OTHER FATIGUE: Primary | ICD-10-CM

## 2025-04-18 DIAGNOSIS — E61.1 LOW SERUM IRON: ICD-10-CM

## 2025-04-18 DIAGNOSIS — D64.9 LOW HEMOGLOBIN: ICD-10-CM

## 2025-04-18 DIAGNOSIS — R23.1 PALLOR: ICD-10-CM

## 2025-04-18 DIAGNOSIS — D50.9 IRON DEFICIENCY ANEMIA, UNSPECIFIED IRON DEFICIENCY ANEMIA TYPE: ICD-10-CM

## 2025-04-18 PROCEDURE — 96365 THER/PROPH/DIAG IV INF INIT: CPT

## 2025-04-18 RX ORDER — SODIUM CHLORIDE 9 MG/ML
20 INJECTION, SOLUTION INTRAVENOUS ONCE
Status: COMPLETED | OUTPATIENT
Start: 2025-04-18 | End: 2025-04-18

## 2025-04-18 RX ORDER — SODIUM CHLORIDE 9 MG/ML
20 INJECTION, SOLUTION INTRAVENOUS ONCE
Status: CANCELLED | OUTPATIENT
Start: 2025-04-25

## 2025-04-18 RX ADMIN — IRON SUCROSE 200 MG: 20 INJECTION, SOLUTION INTRAVENOUS at 15:11

## 2025-04-18 RX ADMIN — SODIUM CHLORIDE 20 ML/HR: 0.9 INJECTION, SOLUTION INTRAVENOUS at 15:09

## 2025-04-18 NOTE — PROGRESS NOTES
Patient tolerated venofer infusion without incident. Peripheral IV removed. Next appointment confirmed for 4/25/2025 at 1300.

## 2025-04-22 ENCOUNTER — ANESTHESIA (OUTPATIENT)
Dept: GASTROENTEROLOGY | Facility: AMBULARY SURGERY CENTER | Age: 65
End: 2025-04-22
Payer: COMMERCIAL

## 2025-04-22 ENCOUNTER — HOSPITAL ENCOUNTER (OUTPATIENT)
Dept: GASTROENTEROLOGY | Facility: AMBULARY SURGERY CENTER | Age: 65
Setting detail: OUTPATIENT SURGERY
Discharge: HOME | End: 2025-04-22
Attending: INTERNAL MEDICINE
Payer: COMMERCIAL

## 2025-04-22 ENCOUNTER — TELEPHONE (OUTPATIENT)
Age: 65
End: 2025-04-22

## 2025-04-22 VITALS
HEART RATE: 70 BPM | TEMPERATURE: 98.4 F | OXYGEN SATURATION: 95 % | SYSTOLIC BLOOD PRESSURE: 110 MMHG | DIASTOLIC BLOOD PRESSURE: 68 MMHG | RESPIRATION RATE: 16 BRPM

## 2025-04-22 DIAGNOSIS — D50.8 OTHER IRON DEFICIENCY ANEMIA: ICD-10-CM

## 2025-04-22 DIAGNOSIS — R19.5 POSITIVE OCCULT STOOL BLOOD TEST: ICD-10-CM

## 2025-04-22 DIAGNOSIS — K63.89 COLONIC MASS: Primary | ICD-10-CM

## 2025-04-22 PROCEDURE — 43239 EGD BIOPSY SINGLE/MULTIPLE: CPT | Performed by: INTERNAL MEDICINE

## 2025-04-22 PROCEDURE — 88341 IMHCHEM/IMCYTCHM EA ADD ANTB: CPT | Performed by: PATHOLOGY

## 2025-04-22 PROCEDURE — 45380 COLONOSCOPY AND BIOPSY: CPT | Performed by: INTERNAL MEDICINE

## 2025-04-22 PROCEDURE — 88342 IMHCHEM/IMCYTCHM 1ST ANTB: CPT | Performed by: PATHOLOGY

## 2025-04-22 PROCEDURE — 43251 EGD REMOVE LESION SNARE: CPT | Performed by: INTERNAL MEDICINE

## 2025-04-22 PROCEDURE — 88305 TISSUE EXAM BY PATHOLOGIST: CPT | Performed by: PATHOLOGY

## 2025-04-22 RX ORDER — LIDOCAINE HYDROCHLORIDE 10 MG/ML
INJECTION, SOLUTION EPIDURAL; INFILTRATION; INTRACAUDAL; PERINEURAL AS NEEDED
Status: DISCONTINUED | OUTPATIENT
Start: 2025-04-22 | End: 2025-04-22

## 2025-04-22 RX ORDER — PROPOFOL 10 MG/ML
INJECTION, EMULSION INTRAVENOUS AS NEEDED
Status: DISCONTINUED | OUTPATIENT
Start: 2025-04-22 | End: 2025-04-22

## 2025-04-22 RX ORDER — SODIUM CHLORIDE, SODIUM LACTATE, POTASSIUM CHLORIDE, CALCIUM CHLORIDE 600; 310; 30; 20 MG/100ML; MG/100ML; MG/100ML; MG/100ML
INJECTION, SOLUTION INTRAVENOUS CONTINUOUS PRN
Status: DISCONTINUED | OUTPATIENT
Start: 2025-04-22 | End: 2025-04-22

## 2025-04-22 RX ORDER — SODIUM CHLORIDE, SODIUM LACTATE, POTASSIUM CHLORIDE, CALCIUM CHLORIDE 600; 310; 30; 20 MG/100ML; MG/100ML; MG/100ML; MG/100ML
125 INJECTION, SOLUTION INTRAVENOUS CONTINUOUS
Status: CANCELLED | OUTPATIENT
Start: 2025-04-22

## 2025-04-22 RX ADMIN — SODIUM CHLORIDE, SODIUM LACTATE, POTASSIUM CHLORIDE, AND CALCIUM CHLORIDE: .6; .31; .03; .02 INJECTION, SOLUTION INTRAVENOUS at 08:54

## 2025-04-22 RX ADMIN — PROPOFOL 150 MG: 10 INJECTION, EMULSION INTRAVENOUS at 08:57

## 2025-04-22 RX ADMIN — LIDOCAINE HYDROCHLORIDE 100 MG: 10 INJECTION, SOLUTION EPIDURAL; INFILTRATION; INTRACAUDAL at 08:57

## 2025-04-22 RX ADMIN — PROPOFOL 50 MG: 10 INJECTION, EMULSION INTRAVENOUS at 09:02

## 2025-04-22 RX ADMIN — PROPOFOL 50 MG: 10 INJECTION, EMULSION INTRAVENOUS at 09:11

## 2025-04-22 RX ADMIN — PROPOFOL 50 MG: 10 INJECTION, EMULSION INTRAVENOUS at 09:08

## 2025-04-22 RX ADMIN — PROPOFOL 50 MG: 10 INJECTION, EMULSION INTRAVENOUS at 08:59

## 2025-04-22 RX ADMIN — PROPOFOL 50 MG: 10 INJECTION, EMULSION INTRAVENOUS at 09:05

## 2025-04-22 RX ADMIN — PROPOFOL 50 MG: 10 INJECTION, EMULSION INTRAVENOUS at 09:14

## 2025-04-22 NOTE — ANESTHESIA PREPROCEDURE EVALUATION
Procedure:  COLONOSCOPY  EGD    Relevant Problems   HEMATOLOGY   (+) Iron deficiency anemia      PULMONARY   (+) Intermittent asthma        Physical Exam    Airway    Mallampati score: II  TM Distance: >3 FB  Neck ROM: full     Dental   No notable dental hx     Cardiovascular  Cardiovascular exam normal    Pulmonary  Pulmonary exam normal     Other Findings        Anesthesia Plan  ASA Score- 2     Anesthesia Type- IV sedation with anesthesia with ASA Monitors.         Additional Monitors:     Airway Plan:            Plan Factors-Exercise tolerance (METS): >4 METS.    Chart reviewed.   Existing labs reviewed. Patient summary reviewed.    Patient is not a current smoker.              Induction-     Postoperative Plan-     Perioperative Resuscitation Plan - Level 1 - Full Code.       Informed Consent- Anesthetic plan and risks discussed with patient.  I personally reviewed this patient with the CRNA. Discussed and agreed on the Anesthesia Plan with the CRNA..      NPO Status:  Vitals Value Taken Time   Date of last liquid 04/22/25 04/22/25 0732   Time of last liquid 0500 04/22/25 0732   Date of last solid 04/20/25 04/22/25 0732   Time of last solid 2000 04/22/25 0732

## 2025-04-22 NOTE — TELEPHONE ENCOUNTER
Patients GI provider:       Number to return call: [phone]     Reason for call: Pt wife  calling to schedule with Dr Robles per decision tree transferred to the office( donn) to assist in scheduling.  Referral in the system.    Scheduled procedure/appointment date if applicable:

## 2025-04-22 NOTE — ANESTHESIA POSTPROCEDURE EVALUATION
Post-Op Assessment Note    CV Status:  Stable  Pain Score: 0    Pain management: adequate       Mental Status:  Sleepy and arousable   Hydration Status:  Stable   PONV Controlled:  Controlled   Airway Patency:  Patent     Post Op Vitals Reviewed: Yes    No anethesia notable event occurred.    Staff: CRNA           Last Filed PACU Vitals:  Vitals Value Taken Time   Temp     Pulse 70    BP 96/50    Resp 11    SpO2 98

## 2025-04-22 NOTE — INTERVAL H&P NOTE
H&P reviewed. After examining the patient I find no changes in the patients condition since the H&P had been written.    Vitals:    04/22/25 0735   BP: 133/66   Pulse: 98   Resp: 18   Temp: 98.4 °F (36.9 °C)   SpO2: 98%

## 2025-04-23 ENCOUNTER — RESULTS FOLLOW-UP (OUTPATIENT)
Age: 65
End: 2025-04-23

## 2025-04-23 LAB — CEA SERPL-MCNC: 1.8 NG/ML (ref 0–4.7)

## 2025-04-23 PROCEDURE — 88341 IMHCHEM/IMCYTCHM EA ADD ANTB: CPT | Performed by: PATHOLOGY

## 2025-04-23 PROCEDURE — 88342 IMHCHEM/IMCYTCHM 1ST ANTB: CPT | Performed by: PATHOLOGY

## 2025-04-23 PROCEDURE — 88305 TISSUE EXAM BY PATHOLOGIST: CPT | Performed by: PATHOLOGY

## 2025-04-24 ENCOUNTER — RESULTS FOLLOW-UP (OUTPATIENT)
Dept: FAMILY MEDICINE CLINIC | Facility: CLINIC | Age: 65
End: 2025-04-24

## 2025-04-25 ENCOUNTER — TELEPHONE (OUTPATIENT)
Age: 65
End: 2025-04-25

## 2025-04-25 ENCOUNTER — HOSPITAL ENCOUNTER (OUTPATIENT)
Dept: INFUSION CENTER | Facility: CLINIC | Age: 65
End: 2025-04-25
Attending: FAMILY MEDICINE
Payer: COMMERCIAL

## 2025-04-25 VITALS
TEMPERATURE: 97.8 F | DIASTOLIC BLOOD PRESSURE: 77 MMHG | OXYGEN SATURATION: 99 % | SYSTOLIC BLOOD PRESSURE: 127 MMHG | RESPIRATION RATE: 16 BRPM | HEART RATE: 65 BPM

## 2025-04-25 DIAGNOSIS — E61.1 LOW SERUM IRON: ICD-10-CM

## 2025-04-25 DIAGNOSIS — D64.9 LOW HEMOGLOBIN: ICD-10-CM

## 2025-04-25 DIAGNOSIS — R23.1 PALLOR: ICD-10-CM

## 2025-04-25 DIAGNOSIS — R53.83 OTHER FATIGUE: Primary | ICD-10-CM

## 2025-04-25 DIAGNOSIS — D50.9 IRON DEFICIENCY ANEMIA, UNSPECIFIED IRON DEFICIENCY ANEMIA TYPE: ICD-10-CM

## 2025-04-25 PROCEDURE — 96365 THER/PROPH/DIAG IV INF INIT: CPT

## 2025-04-25 RX ORDER — SODIUM CHLORIDE 9 MG/ML
20 INJECTION, SOLUTION INTRAVENOUS ONCE
Status: COMPLETED | OUTPATIENT
Start: 2025-04-25 | End: 2025-04-25

## 2025-04-25 RX ORDER — SODIUM CHLORIDE 9 MG/ML
20 INJECTION, SOLUTION INTRAVENOUS ONCE
Status: CANCELLED | OUTPATIENT
Start: 2025-05-01

## 2025-04-25 RX ADMIN — IRON SUCROSE 200 MG: 20 INJECTION, SOLUTION INTRAVENOUS at 13:04

## 2025-04-25 RX ADMIN — SODIUM CHLORIDE 20 ML/HR: 9 INJECTION, SOLUTION INTRAVENOUS at 13:04

## 2025-04-25 NOTE — TELEPHONE ENCOUNTER
Patient had colonoscopy with Dr. Ross that showed cancer.  Referred to Dr. Robles for surgery.  Scheduled with Dr. Robles but scheduling called and switched appt with to Travis to an appt with Dr. Alicea since he has a more open schedule for surgery.  Wants to make sure that Dr. Ross is alright with this change.  Please advise.

## 2025-04-25 NOTE — PROGRESS NOTES
Patient arrives to infusion center for Venofer today. PIV placed by CD RN. Patient resting on recliner chair, call bell within reach.

## 2025-04-25 NOTE — PROGRESS NOTES
Patient tolerated Venofer without issue. PIV removed intact, coban wrap in place. Patient aware of next appt at [facility] infusion 5/1 at 1430, AVS printed and provided.

## 2025-04-26 ENCOUNTER — HOSPITAL ENCOUNTER (OUTPATIENT)
Dept: CT IMAGING | Facility: HOSPITAL | Age: 65
Discharge: HOME | End: 2025-04-26
Attending: INTERNAL MEDICINE
Payer: COMMERCIAL

## 2025-04-26 DIAGNOSIS — K63.89 COLONIC MASS: ICD-10-CM

## 2025-04-26 PROCEDURE — 74177 CT ABD & PELVIS W/CONTRAST: CPT

## 2025-04-26 PROCEDURE — 71260 CT THORAX DX C+: CPT

## 2025-04-26 RX ADMIN — IOHEXOL 75 ML: 350 INJECTION, SOLUTION INTRAVENOUS at 16:19

## 2025-04-28 NOTE — H&P (VIEW-ONLY)
Name: Ramsey Rodgers      : 1960      MRN: [id number]  Encounter Provider: Brock Alicea MD  Encounter Date: 2025   Encounter department: [facility] [place]  :  Assessment & Plan  Cancer of hepatic flexure (HCC)  Ramsey is a 64-year-old male, no prior colonoscopy, had weakness/fatigue and hemoglobin 8.2 on CBC check 1 month prior.  In the interval he has undergone EGD/colonoscopy.  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  CT scan has just been done this week, chest/abdomen/pelvis, preliminary review shows hepatic flexure tumor, official reads are pending.    He has been undergoing iron infusions, already has increases in energy with this and pain attention to his diet, iron and protein intake.    He has not had prior abdominal surgery, abdominal exam is normal without tenderness.    Today we discussed with him and his wife a robotic, possible laparoscopic versus open right hemicolectomy,in a face-to-face, personal, informed consent process, the benefits, alternatives,   risks including not limited to bleeding, infection, risks of anesthesia, open surgery, DVT/PE, heart attack, stroke, death, damage to local structures(e.g. ureter, duodenum),anastomotic leak requiring reoperation, temporary versus permanent stoma. They understood these risks, signed informed consent, and wish to proceed.    He is undergoing iron infusions concurrently, discussed with Dr. Gorman and these will be accelerated, we planned 5/15/2025 for operative date, surgical optimization referral has been placed as well as completion preoperative labs, type and screen, A1c, EKG.    Plan 5/15/25 robotic, possible laparoscopic versus open right hemicolectomy    Orders:    Fingerstick Glucose (POCT); Standing    Nursing Communication Warming Interventions; Standing    Nursing Communication Chlorhexidine Cloths; Standing    Void on call to OR; Standing    Insert  peripheral IV; Standing    Shower; Standing    lactated ringers infusion    Place sequential compression device; Standing    Case request operating room: RESECTION COLON RIGHT LAPAROSCOPIC W/ ROBOTICS; Standing    neomycin (MYCIFRADIN) 500 mg tablet; Take 2 tablets (1,000 mg total) by mouth 3 (three) times a day for 3 doses Take at 1 PM, 2 PM  and 10 PM the day before the procedure.    metroNIDAZOLE (FLAGYL) 500 mg tablet; Take 1 tablet (500 mg total) by mouth 3 (three) times a day for 3 doses Take at 1 PM, 2 PM  and 10 PM the day before the procedure.    Ambulatory referral to surgical optimization; Future    HEMOGLOBIN A1C W/ EAG ESTIMATION; Future    Type and screen; Future    EKG 12 lead; Future    Nursing communication Please give pre-op Carbohydrate drink to patient 2-4 hours prior to surgery ([facility]ink [facility]); Standing    heparin (porcine) subcutaneous injection 5,000 Units    ceFAZolin (ANCEF) 2,000 mg in sodium chloride 0.9 % 50 mL IVPB    metroNIDAZOLE (FLAGYL) (HIGH DOSE) IVPB 500 mg    bisacodyl (DULCOLAX) EC tablet 10 mg    polyethylene glycol (GLYCOLAX) bowel prep 255 g    acetaminophen (TYLENOL) tablet 975 mg    Colonic mass    Orders:    Ambulatory referral to Colorectal Surgery           History of Present Illness   HPI    Ramsey Rodgers is here today for an evaluation of colonic mass.     His most recent EGD/colonoscopy was on 4/22/25 with Dr. Ross which revealed Circumferential obstructing mass at the hepatic flexure area status post multiple biopsies.     Pathology:   RESULTS OF IMMUNOHISTOCHEMICAL ANALYSIS FOR MISMATCH REPAIR PROTEIN LOSS  INTERPRETATION: No loss of nuclear expression of MMR proteins: low probability of MSI-H     RESULTS:  Antibody            Clone                 Description                                 Results  MLH1                 G168                  Mismatch repair protein  Intact nuclear expression  MSH2                [id number]         "Mismatch repair protein  Intact nuclear expression  MSH6                SP93                  Mismatch repair protein  Intact nuclear expression  PMS2                 EP51                  Mismatch repair protein  Intact nuclear expression    C. Large Intestine, Hepatic Flexure, hepatic flexure mass bx:  - Adenocarcinoma arising in a tubular adenoma with high grade dysplasia.    CT on 4/26/25- pending read    Lab Results   Component Value Date    CEA 1.8 04/22/2025      Lab Results   Component Value Date    WBC 6.3 03/25/2025    HGB 8.2 (LL) 03/25/2025    HCT 30.5 (L) 03/25/2025    MCV 67 (L) 03/25/2025     03/25/2025      Lab Results   Component Value Date     10/20/2017    SODIUM 144 03/25/2025    K 4.4 03/25/2025     (H) 03/25/2025    CO2 23 03/25/2025    BUN 22 03/25/2025    CREATININE 1.17 03/25/2025    GLUC 95 03/25/2025    CALCIUM 9.0 10/12/2017    AST 15 03/25/2025    ALT 8 03/25/2025    ALKPHOS 56 10/12/2017    PROT 6.7 10/12/2017    TP 6.4 03/25/2025    BILITOT 1.2 10/12/2017    TBILI 0.7 03/25/2025    EGFR 70 03/25/2025      Review of Systems    Objective   Ht 6' 1.5\" (1.867 m)   Wt 85.7 kg (189 lb)   BMI 24.60 kg/m²      Physical Exam  Eyes:      Pupils: Pupils are equal, round, and reactive to light.   Cardiovascular:      Rate and Rhythm: Normal rate.   Pulmonary:      Effort: Pulmonary effort is normal.   Abdominal:      Palpations: Abdomen is soft.      Tenderness: There is no abdominal tenderness.   Musculoskeletal:      Right lower leg: No edema.      Left lower leg: No edema.   Neurological:      Mental Status: He is alert.   Psychiatric:         Mood and Affect: Mood normal.         "

## 2025-04-28 NOTE — PROGRESS NOTES
Name: Ramsey Rodgers      : 1960      MRN: [id number]  Encounter Provider: Brock Alicea MD  Encounter Date: 2025   Encounter department: [facility] [place]  :  Assessment & Plan  Cancer of hepatic flexure (HCC)  Ramsey is a 64-year-old male, no prior colonoscopy, had weakness/fatigue and hemoglobin 8.2 on CBC check 1 month prior.  In the interval he has undergone EGD/colonoscopy.  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  CT scan has just been done this week, chest/abdomen/pelvis, preliminary review shows hepatic flexure tumor, official reads are pending.    He has been undergoing iron infusions, already has increases in energy with this and pain attention to his diet, iron and protein intake.    He has not had prior abdominal surgery, abdominal exam is normal without tenderness.    Today we discussed with him and his wife a robotic, possible laparoscopic versus open right hemicolectomy,in a face-to-face, personal, informed consent process, the benefits, alternatives,   risks including not limited to bleeding, infection, risks of anesthesia, open surgery, DVT/PE, heart attack, stroke, death, damage to local structures(e.g. ureter, duodenum),anastomotic leak requiring reoperation, temporary versus permanent stoma. They understood these risks, signed informed consent, and wish to proceed.    He is undergoing iron infusions concurrently, discussed with Dr. Gorman and these will be accelerated, we planned 5/15/2025 for operative date, surgical optimization referral has been placed as well as completion preoperative labs, type and screen, A1c, EKG.    Plan 5/15/25 robotic, possible laparoscopic versus open right hemicolectomy    Orders:    Fingerstick Glucose (POCT); Standing    Nursing Communication Warming Interventions; Standing    Nursing Communication Chlorhexidine Cloths; Standing    Void on call to OR; Standing    Insert  peripheral IV; Standing    Shower; Standing    lactated ringers infusion    Place sequential compression device; Standing    Case request operating room: RESECTION COLON RIGHT LAPAROSCOPIC W/ ROBOTICS; Standing    neomycin (MYCIFRADIN) 500 mg tablet; Take 2 tablets (1,000 mg total) by mouth 3 (three) times a day for 3 doses Take at 1 PM, 2 PM  and 10 PM the day before the procedure.    metroNIDAZOLE (FLAGYL) 500 mg tablet; Take 1 tablet (500 mg total) by mouth 3 (three) times a day for 3 doses Take at 1 PM, 2 PM  and 10 PM the day before the procedure.    Ambulatory referral to surgical optimization; Future    HEMOGLOBIN A1C W/ EAG ESTIMATION; Future    Type and screen; Future    EKG 12 lead; Future    Nursing communication Please give pre-op Carbohydrate drink to patient 2-4 hours prior to surgery (Drink is [facility] by [facility]); Standing    heparin (porcine) subcutaneous injection 5,000 Units    ceFAZolin (ANCEF) 2,000 mg in sodium chloride 0.9 % 50 mL IVPB    metroNIDAZOLE (FLAGYL) (HIGH DOSE) IVPB 500 mg    bisacodyl (DULCOLAX) EC tablet 10 mg    polyethylene glycol (GLYCOLAX) bowel prep 255 g    acetaminophen (TYLENOL) tablet 975 mg    Colonic mass    Orders:    Ambulatory referral to Colorectal Surgery           History of Present Illness   HPI    Ramsey Rodgers is here today for an evaluation of colonic mass.     His most recent EGD/colonoscopy was on 4/22/25 with Dr. Ross which revealed Circumferential obstructing mass at the hepatic flexure area status post multiple biopsies.     Pathology:   RESULTS OF IMMUNOHISTOCHEMICAL ANALYSIS FOR MISMATCH REPAIR PROTEIN LOSS  INTERPRETATION: No loss of nuclear expression of MMR proteins: low probability of MSI-H     RESULTS:  Antibody            Clone                 Description                                 Results  MLH1                 G168                  Mismatch repair protein  Intact nuclear expression  MSH2                [id number]         "Mismatch repair protein  Intact nuclear expression  MSH6                SP93                  Mismatch repair protein  Intact nuclear expression  PMS2                 EP51                  Mismatch repair protein  Intact nuclear expression    C. Large Intestine, Hepatic Flexure, hepatic flexure mass bx:  - Adenocarcinoma arising in a tubular adenoma with high grade dysplasia.    CT on 4/26/25- pending read    Lab Results   Component Value Date    CEA 1.8 04/22/2025      Lab Results   Component Value Date    WBC 6.3 03/25/2025    HGB 8.2 (LL) 03/25/2025    HCT 30.5 (L) 03/25/2025    MCV 67 (L) 03/25/2025     03/25/2025      Lab Results   Component Value Date     10/20/2017    SODIUM 144 03/25/2025    K 4.4 03/25/2025     (H) 03/25/2025    CO2 23 03/25/2025    BUN 22 03/25/2025    CREATININE 1.17 03/25/2025    GLUC 95 03/25/2025    CALCIUM 9.0 10/12/2017    AST 15 03/25/2025    ALT 8 03/25/2025    ALKPHOS 56 10/12/2017    PROT 6.7 10/12/2017    TP 6.4 03/25/2025    BILITOT 1.2 10/12/2017    TBILI 0.7 03/25/2025    EGFR 70 03/25/2025      Review of Systems    Objective   Ht 6' 1.5\" (1.867 m)   Wt 85.7 kg (189 lb)   BMI 24.60 kg/m²      Physical Exam  Eyes:      Pupils: Pupils are equal, round, and reactive to light.   Cardiovascular:      Rate and Rhythm: Normal rate.   Pulmonary:      Effort: Pulmonary effort is normal.   Abdominal:      Palpations: Abdomen is soft.      Tenderness: There is no abdominal tenderness.   Musculoskeletal:      Right lower leg: No edema.      Left lower leg: No edema.   Neurological:      Mental Status: He is alert.   Psychiatric:         Mood and Affect: Mood normal.         "

## 2025-04-29 ENCOUNTER — OFFICE VISIT (OUTPATIENT)
Age: 65
End: 2025-04-29
Payer: COMMERCIAL

## 2025-04-29 ENCOUNTER — TELEPHONE (OUTPATIENT)
Age: 65
End: 2025-04-29

## 2025-04-29 VITALS — HEIGHT: 74 IN | WEIGHT: 189 LBS | BODY MASS INDEX: 24.26 KG/M2

## 2025-04-29 DIAGNOSIS — C18.3 CANCER OF HEPATIC FLEXURE (HCC): Primary | ICD-10-CM

## 2025-04-29 DIAGNOSIS — K63.89 COLONIC MASS: ICD-10-CM

## 2025-04-29 DIAGNOSIS — D50.0 IRON DEFICIENCY ANEMIA DUE TO CHRONIC BLOOD LOSS: Primary | ICD-10-CM

## 2025-04-29 PROCEDURE — 99205 OFFICE O/P NEW HI 60 MIN: CPT | Performed by: COLON & RECTAL SURGERY

## 2025-04-29 RX ORDER — METRONIDAZOLE 500 MG/1
500 TABLET ORAL 3 TIMES DAILY
Qty: 3 TABLET | Refills: 0 | Status: SHIPPED | OUTPATIENT
Start: 2025-04-29 | End: 2025-04-30

## 2025-04-29 RX ORDER — SODIUM CHLORIDE, SODIUM LACTATE, POTASSIUM CHLORIDE, CALCIUM CHLORIDE 600; 310; 30; 20 MG/100ML; MG/100ML; MG/100ML; MG/100ML
20 INJECTION, SOLUTION INTRAVENOUS CONTINUOUS
OUTPATIENT
Start: 2025-04-29

## 2025-04-29 RX ORDER — HEPARIN SODIUM 5000 [USP'U]/ML
5000 INJECTION, SOLUTION INTRAVENOUS; SUBCUTANEOUS ONCE
OUTPATIENT
Start: 2025-04-29 | End: 2025-04-29

## 2025-04-29 RX ORDER — ACETAMINOPHEN 325 MG/1
975 TABLET ORAL ONCE
OUTPATIENT
Start: 2025-04-29 | End: 2025-04-29

## 2025-04-29 RX ORDER — BISACODYL 5 MG/1
10 TABLET, DELAYED RELEASE ORAL 2 TIMES DAILY
OUTPATIENT
Start: 2025-04-29 | End: 2025-04-30

## 2025-04-29 RX ORDER — NEOMYCIN SULFATE 500 MG/1
1000 TABLET ORAL 3 TIMES DAILY
Qty: 6 TABLET | Refills: 0 | Status: SHIPPED | OUTPATIENT
Start: 2025-04-29 | End: 2025-04-30

## 2025-04-29 RX ORDER — POLYETHYLENE GLYCOL 3350 17 G/17G
255 POWDER, FOR SOLUTION ORAL ONCE
OUTPATIENT
Start: 2025-04-29 | End: 2025-04-29

## 2025-04-29 NOTE — ASSESSMENT & PLAN NOTE
Ramsey is a 64-year-old male, no prior colonoscopy, had weakness/fatigue and hemoglobin 8.2 on CBC check 1 month prior.  In the interval he has undergone EGD/colonoscopy.  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  CT scan has just been done this week, chest/abdomen/pelvis, preliminary review shows hepatic flexure tumor, official reads are pending.    He has been undergoing iron infusions, already has increases in energy with this and pain attention to his diet, iron and protein intake.    He has not had prior abdominal surgery, abdominal exam is normal without tenderness.    Today we discussed with him and his wife a robotic, possible laparoscopic versus open right hemicolectomy,in a face-to-face, personal, informed consent process, the benefits, alternatives,   risks including not limited to bleeding, infection, risks of anesthesia, open surgery, DVT/PE, heart attack, stroke, death, damage to local structures(e.g. ureter, duodenum),anastomotic leak requiring reoperation, temporary versus permanent stoma. They understood these risks, signed informed consent, and wish to proceed.    He is undergoing iron infusions concurrently, discussed with Dr. Gorman and these will be accelerated, we planned 5/15/2025 for operative date, surgical optimization referral has been placed as well as completion preoperative labs, type and screen, A1c, EKG.    Plan 5/15/25 robotic, possible laparoscopic versus open right hemicolectomy    Orders:    Fingerstick Glucose (POCT); Standing    Nursing Communication Warming Interventions; Standing    Nursing Communication Chlorhexidine Cloths; Standing    Void on call to OR; Standing    Insert peripheral IV; Standing    Shower; Standing    lactated ringers infusion    Place sequential compression device; Standing    Case request operating room: RESECTION COLON RIGHT LAPAROSCOPIC W/ ROBOTICS; Standing    neomycin (MYCIFRADIN) 500 mg tablet; Take 2  tablets (1,000 mg total) by mouth 3 (three) times a day for 3 doses Take at 1 PM, 2 PM  and 10 PM the day before the procedure.    metroNIDAZOLE (FLAGYL) 500 mg tablet; Take 1 tablet (500 mg total) by mouth 3 (three) times a day for 3 doses Take at 1 PM, 2 PM  and 10 PM the day before the procedure.    Ambulatory referral to surgical optimization; Future    HEMOGLOBIN A1C W/ EAG ESTIMATION; Future    Type and screen; Future    EKG 12 lead; Future    Nursing communication Please give pre-op Carbohydrate drink to patient 2-4 hours prior to surgery (Drink is [facility]); Standing    heparin (porcine) subcutaneous injection 5,000 Units    ceFAZolin (ANCEF) 2,000 mg in sodium chloride 0.9 % 50 mL IVPB    metroNIDAZOLE (FLAGYL) (HIGH DOSE) IVPB 500 mg    bisacodyl (DULCOLAX) EC tablet 10 mg    polyethylene glycol (GLYCOLAX) bowel prep 255 g    acetaminophen (TYLENOL) tablet 975 mg

## 2025-04-29 NOTE — LETTER
Ramsey Rodgers  [address]  [facility] [address]        4/29/2025    Dear Ramsey Rodgers,    Your surgery is scheduled for: May 15, 2025   The hospital will call the evening prior to your surgery with your expected arrival time.   Location:[address] [address]    CHECK LIST PRIOR TO INPATIENT SURGERY  It is your responsibility to obtain any/all referrals needed for your surgery if required by your insurance. Our office will contact you to discuss your insurance coverage for this procedure.    Special instructions required if you are taking any blood thinners. Please verify with the prescribing physician. Examples include Coumadin, Plavix, Xarelto, Eliquis, Pradaxa, etc.    Please check with your family physician if you are taking the following medications: Aspirin or any Aspirin containing medication, Gingko biloba, Ginseng, Feverfew, and/or Melly’s Wort. We suggest stopping these for 3 days.     The night before and the day of your surgery, wash from your neck to groin with chlorhexidine soap.  This soap is available at most retail pharmacies under such brand names as Hibiclens, Endure or Aplicare.    Pre-admission testing Required: YES X     If Yes, what type:  BLOOD-WORK  X     BOWEL PREPARATION REQUIRED: YES X NO   If yes, start date: 5/14/25. Please see attached bowel preparation instructions.    NOTHING TO EAT OR DRINK AFTER MIDNIGHT PRIOR TO SURGERY.    Please do not hesitate to call our office with any questions regarding your surgery.                       MIRALAX/GATORADE SURGERY PREPARATION INSTRUCTIONS  DAY BEFORE SURGERY:  Have a normal breakfast and clear liquids thereafter. It is important that you drink plenty of clear liquids throughout the day to prevent dehydration. Nothing red, orange or purple    You MAY have:  Soda  Water  Broth Gatorade  Jell-O  Popsicles Coffee/Tea without milk/creamer     YOU MAY NOT HAVE:  Solid Foods  Milk and  milk products  Juice with pulp    BOWEL PREPARATION: Includes: One (1) 238-gram container of Miralax (polyethylene glycol 3350), four (4) 5 mg Dulcolax (bisacodyl) tablets, and 64-ounces of Gatorade (sports drink). Preparation may be refrigerated. Entire bowel prep should be completed.    **REMEMBER** A prescription for antibiotics has been called into your pharmacy for  prior to your surgery.    At 1:00 pm, take (4) Dulcolax Tablets with 8 oz. of water.  Swallow the tablets whole with a full glass of water.  The package may direct you not to exceed (2) tablets at any time but for the purpose of this examination, you should take (4).    At 1:00 pm, Mix the 238g bottle of MiraLax in 64 oz. of Gatorade and shake the solution until the MiraLax is dissolved.  Drink 8 oz. glassfuls at your own pace.  It may take 3-4 hours to drink all the solution.    At 1:00 pm, take your first dose of antibiotic as prescribed.    At 2:00 pm, take your second dose of antibiotic as prescribed.    At 10:00 pm, take your last dose of antibiotic as prescribed.  REMEMBER TO STAY CLOSE TO TOILET FACILITIES.    DAY OF SURGERY  NOTHING TO EAT OR DRINK (INCLUDING CHEWING GUM) AFTER 12 MIDNIGHT PRIOR TO YOUR SURGERY EXCEPT YOUR PRE-SURGERY ENSURE DRINK (IF APPLICABLE).  For any questions or concerns related to your bowel preparation or pre-procedure instructions, please contact our office. Thank you for choosing [facility]!                            Post-Operative Care Information  Abdominal Surgery    What are my post-operative instructions?   The following information and instructions are for your continued care after discharge from the hospital. Please read the information (including the After Visit Summary provided to you at the time of discharge) carefully. If you have any questions or concerns, please contact the clinical staff at [phone]    How do I take care of my incision?  Your incision(s) may have  surgical glue, dissolvable sutures with white pieces of tape called steri strips, or staples.   If you have steri strips or surgical glue, do not remove them. Steri strips will fall off naturally in 7-10 days. Surgical glue (Exofin) will fall off over a period of up to 2-3 weeks.   Do not put any topical ointments or lotions on the incisions.   Avoid tight clothing around your incision(s) or fabric that may irritate your skin such as wool, hooks and kassy.     Should I continue taking my prescribed medications?   Take medications as prescribed. Please review the After Visit Summary provided to you at the time of discharge for details.     How will I manage my pain at home?  For best pain control take your pain medication at regular intervals for the first couple of days, about every 4-6 hours. This will prevent pain build-up, which occurs when medication is taken on an as needed basis.   Use only as much prescription pain medication as you need (i.e. 1 tablet instead of 2).  Taper off the prescription pain medication as pain decreases, stopping narcotics first.   Use over-the-counter acetaminophen (Tylenolâ) if your doctor allows. When using over-the-counter medication, never use more than what is prescribed on the package directions. Do not take more than 3000mg of Tylenolâ in a 24 hour period. Do not take more than 2400mg of Ibuprofen (such as Motrinâ or Advilâ) in a 24 hour period.   While taking prescription pain medication you may not drive, work, or drink alcohol.     Are there any diet restrictions?   Continue low fiber diet up to 1 week post op.  (This allows the bowel to rest)    It is normal for bowel movements to be loose and occur more frequently post-operatively. This should improve over time.  During this time pay attention to hydration  1.5 weeks post op you may slowly begin to add fiber back into your diet.    By 2 weeks post op you may resume a normal high fiber diet.     What activity  restrictions will I have?   Walk as much as tolerated.  Do not lift, pull, or push objects greater than 10 pounds for 6 weeks. This includes vacuuming, lifting children or groceries, walking the dog, mowing lawns, snow shoveling, etc. Please discuss other specific physical activities with the doctor at your post op appointment.   Do not drive while taking pain medications. You may drive when you have no pain - usually in 1-2 weeks following surgery.   You may climb stairs in moderation but do not overtire.  Resume sexual activity after you are cleared by your doctor.               When will I receive follow-up care?   You will be scheduled to return to see your physician in the office typically in 3-4 weeks after surgery.    If you do not have a scheduled appointment at the time of discharge, please call the office at [phone].    When should I call my doctor?   Notify your doctor for any of the following signs and symptoms of possible infection:   Temperature above 101 degrees.   Increase in pain or discomfort.   Redness, swelling, drainage at incision site(s). A small amount of clear yellow or pinkish-yellow drainage is normal  If incision begins to open.     Call if you have any changes in your overall health such as having:   Nausea   Vomiting   Chills   profuse (excessive) sweating   inability to urinate or completely empty bladder   diarrhea   constipation

## 2025-04-30 ENCOUNTER — APPOINTMENT (OUTPATIENT)
Dept: LAB | Facility: CLINIC | Age: 65
DRG: 329 | End: 2025-04-30
Payer: COMMERCIAL

## 2025-04-30 ENCOUNTER — OFFICE VISIT (OUTPATIENT)
Dept: LAB | Facility: CLINIC | Age: 65
DRG: 329 | End: 2025-04-30
Payer: COMMERCIAL

## 2025-04-30 ENCOUNTER — TELEPHONE (OUTPATIENT)
Dept: ANESTHESIOLOGY | Facility: CLINIC | Age: 65
End: 2025-04-30

## 2025-04-30 ENCOUNTER — DOCUMENTATION (OUTPATIENT)
Dept: HEMATOLOGY ONCOLOGY | Facility: CLINIC | Age: 65
End: 2025-04-30

## 2025-04-30 DIAGNOSIS — D50.0 IRON DEFICIENCY ANEMIA SECONDARY TO BLOOD LOSS (CHRONIC): ICD-10-CM

## 2025-04-30 DIAGNOSIS — D50.0 IRON DEFICIENCY ANEMIA SECONDARY TO BLOOD LOSS (CHRONIC): Primary | ICD-10-CM

## 2025-04-30 LAB
ERYTHROCYTE [DISTWIDTH] IN BLOOD BY AUTOMATED COUNT: 24.5 % (ref 11.6–15.1)
EST. AVERAGE GLUCOSE BLD GHB EST-MCNC: 97 MG/DL
HBA1C MFR BLD: 5 %
HCT VFR BLD AUTO: 34.1 % (ref 36.5–49.3)
HGB BLD-MCNC: 9.3 G/DL (ref 12–17)
MCH RBC QN AUTO: 19.5 PG (ref 26.8–34.3)
MCHC RBC AUTO-ENTMCNC: 27.3 G/DL (ref 31.4–37.4)
MCV RBC AUTO: 71 FL (ref 82–98)
PLATELET # BLD AUTO: 374 THOUSANDS/UL (ref 149–390)
PMV BLD AUTO: 10.7 FL (ref 8.9–12.7)
RBC # BLD AUTO: 4.78 MILLION/UL (ref 3.88–5.62)
WBC # BLD AUTO: 5.29 THOUSAND/UL (ref 4.31–10.16)

## 2025-04-30 PROCEDURE — 83036 HEMOGLOBIN GLYCOSYLATED A1C: CPT

## 2025-04-30 PROCEDURE — 85027 COMPLETE CBC AUTOMATED: CPT

## 2025-04-30 PROCEDURE — 36415 COLL VENOUS BLD VENIPUNCTURE: CPT

## 2025-04-30 NOTE — PROGRESS NOTES
In-basket message received from Dr. Alicea to add patient to the [facility] after his surgery on 5/29/2025. Chart reviewed and prep started, pending surgical pathology results.

## 2025-05-01 ENCOUNTER — HOSPITAL ENCOUNTER (OUTPATIENT)
Dept: INFUSION CENTER | Facility: CLINIC | Age: 65
Discharge: HOME | End: 2025-05-01
Attending: FAMILY MEDICINE
Payer: COMMERCIAL

## 2025-05-01 VITALS
RESPIRATION RATE: 18 BRPM | TEMPERATURE: 97.1 F | OXYGEN SATURATION: 98 % | DIASTOLIC BLOOD PRESSURE: 83 MMHG | SYSTOLIC BLOOD PRESSURE: 143 MMHG | HEART RATE: 79 BPM

## 2025-05-01 DIAGNOSIS — D64.9 LOW HEMOGLOBIN: ICD-10-CM

## 2025-05-01 DIAGNOSIS — E61.1 LOW SERUM IRON: ICD-10-CM

## 2025-05-01 DIAGNOSIS — R53.83 OTHER FATIGUE: Primary | ICD-10-CM

## 2025-05-01 DIAGNOSIS — D50.9 IRON DEFICIENCY ANEMIA, UNSPECIFIED IRON DEFICIENCY ANEMIA TYPE: ICD-10-CM

## 2025-05-01 DIAGNOSIS — R23.1 PALLOR: ICD-10-CM

## 2025-05-01 PROCEDURE — 96365 THER/PROPH/DIAG IV INF INIT: CPT

## 2025-05-01 RX ORDER — SODIUM CHLORIDE 9 MG/ML
20 INJECTION, SOLUTION INTRAVENOUS ONCE
Status: CANCELLED | OUTPATIENT
Start: 2025-05-07

## 2025-05-01 RX ORDER — SODIUM CHLORIDE 9 MG/ML
20 INJECTION, SOLUTION INTRAVENOUS ONCE
Status: COMPLETED | OUTPATIENT
Start: 2025-05-01 | End: 2025-05-01

## 2025-05-01 RX ADMIN — IRON SUCROSE 200 MG: 20 INJECTION, SOLUTION INTRAVENOUS at 14:27

## 2025-05-01 RX ADMIN — SODIUM CHLORIDE 20 ML/HR: 0.9 INJECTION, SOLUTION INTRAVENOUS at 14:23

## 2025-05-01 NOTE — PROGRESS NOTES
Received for venofer. No complaints offered. Tolerated infusion without issue. Pt to return 5/7 for next infusion . AVS declined.

## 2025-05-02 ENCOUNTER — TELEPHONE (OUTPATIENT)
Age: 65
End: 2025-05-02

## 2025-05-02 NOTE — TELEPHONE ENCOUNTER
Pt calling. States he has surgery with CRS in 2 wks. CRS provider mentioned that GI may want to increase iron infusions to 2x/wk until surgery. Hgb on 4/30/25 9.3(8.2 one month ago).    Please advise.

## 2025-05-05 ENCOUNTER — TELEMEDICINE (OUTPATIENT)
Dept: ANESTHESIOLOGY | Facility: CLINIC | Age: 65
End: 2025-05-05
Payer: COMMERCIAL

## 2025-05-05 DIAGNOSIS — C18.3 CANCER OF HEPATIC FLEXURE (HCC): ICD-10-CM

## 2025-05-05 DIAGNOSIS — D50.8 OTHER IRON DEFICIENCY ANEMIA: ICD-10-CM

## 2025-05-05 DIAGNOSIS — Z00.8 NUTRITIONAL ASSESSMENT: Primary | ICD-10-CM

## 2025-05-05 PROCEDURE — 99214 OFFICE O/P EST MOD 30 MIN: CPT | Performed by: NURSE PRACTITIONER

## 2025-05-05 NOTE — ASSESSMENT & PLAN NOTE
Undergoing multiple infusions  Will receive two moire doses pre surgery   Encounter Information   Provider Department Encounter # [facility]   5/7/2025 1:00 PM [facility] BED 18 [facility] [phone] [facility]     Appointment Information    Encounter Information   Provider Department Encounter # [facility]   5/14/2025 12:00 PM AN INF BED 1 AN INFUSION [id number] [facility]

## 2025-05-05 NOTE — ASSESSMENT & PLAN NOTE
Seen for SO   Seen for BEST    PMH reviewed as stable  METS 9   Denies CP/denies SOB   Await EKG    Orders:    Ambulatory referral to surgical optimization

## 2025-05-05 NOTE — PROGRESS NOTES
SOC CONSULT:   STARTED BEST   Await EKG     Virtual Regular Visit  Name: Ramsey Rodgers      : 1960      MRN: [id number]  Encounter Provider: ROSANNA Harding  Encounter Date: 2025   Encounter department: [facility]    Case: [id number] Date/Time: 05/15/25 1250   Procedure: RESECTION COLON RIGHT LAPAROSCOPIC W/ ROBOTICS (Abdomen)   Anesthesia type: General   Diagnosis: Cancer of hepatic flexure (HCC) [C18.3]   Pre-op diagnosis: Cancer of hepatic flexure (HCC) [C18.3]   Location:  OR ROOM 14 / [facility]   Surgeons: Brock Alicea MD     LAST ANESTHESIA   Patient reports no past concerns with anesthesia  Assessment & Plan  Cancer of hepatic flexure (HCC)  Seen for SO   Seen for [facility]    PMH reviewed as stable  METS 9   Denies CP/denies SOB   Await EKG    Orders:    Ambulatory referral to surgical optimization    Other iron deficiency anemia  Undergoing multiple infusions  Will receive two moire doses pre surgery   Encounter Information   Provider Department Encounter # [facility]   2025 1:00 PM [facility] IN[facility] BED 18 [facility] INF[facility]ION [phone] [facility]     Appointment Information    Encounter Information   Provider Department Encounter # [facility]   2025 12:00 PM [facility] INF BED 1 [facility] [phone] [facility]           Nutritional assessment  Encouraged to continue to drink 2-03 bottles ensure daily   Encouraged high protein diet   Encouraged to eat an extra 30 grams protein daily   Non smoker   BMI 24      SSI RISK - LOW   NON SMOKER  NON DIABETIC   BMI <40@ 24          History of Present Illness     Ramsey is a 64-year-old male who was referred to SOC for presurgery optimization.  He explains that it was through wellness blood work he was found to be anemic.  Further investigation led to the diagnosis of colon CA.  He is electing surgery.    I met with Ramsey and his wife over video through [employer].  Picture was clear and audio was good.   Patient admits to being well today.  Denies any new developments in his health.  Denies chest pain.  Denies shortness of breath.  METS is 9. Self care patient.      Blood work reviewed and is stable.  He is anemic.  He is receiving multiple infusions.  He has 2 more iron infusions before surgery.    Needs to complete his EKG yet- patient is aware     As always we discussed having your BEST surgery, and BEST recovery.  Surgery goals reviewed today.      Breathing exercises   Patient was encouraged to begin lung exercises today.  This could be accomplished through deep breathing and cough exercises.    Eating/nutrition   Encouraged patient to increase oral protein intake prior to surgery.    This can be accomplished by consuming chicken, fish, tuna fish, cottage cheese, cheese, eggs, Greek yogurt, and protein shakes as needed.  I encouraged use of protein shakes such ENLIVE.  I also recommended making your own protein shakes with protein powder.   Sleep/Stress management  Patient was encouraged to rest their body prior to surgery.  Encouraged attempting to get 8 hours of sleep at night.  Avoid stress.  Avoid sick contacts.  Encouraged to find a relaxing hobby such as reading, meditation, listening to music.  Training exercises  Patient was encouraged to remain active as possible.  Today bilateral lower extremity generic exercises were taught for muscle strengthening and balance.  All exercises to be done sitting down.       HPI  Review of Systems   Constitutional:  Negative for chills and fever.   HENT:  Negative for postnasal drip and sinus pain.    Respiratory:  Negative for cough, choking and wheezing.    Cardiovascular:  Negative for chest pain, palpitations and leg swelling.   Gastrointestinal:  Negative for diarrhea, nausea and rectal pain.   Genitourinary:  Negative for difficulty urinating.   Skin:  Negative for color change, pallor, rash and wound.   Neurological:  Positive for light-headedness. Negative for  dizziness, facial asymmetry and headaches.        ANEMIA RELATED    Psychiatric/Behavioral:  Negative for agitation, behavioral problems, confusion and decreased concentration.        Objective   There were no vitals taken for this visit.    Physical Exam  HENT:      Head: Normocephalic.      Nose: Nose normal.   Pulmonary:      Effort: Pulmonary effort is normal.   Neurological:      General: No focal deficit present.      Mental Status: He is alert and oriented to person, place, and time. Mental status is at baseline.   Psychiatric:         Mood and Affect: Mood normal.         Behavior: Behavior normal.         Thought Content: Thought content normal.         Judgment: Judgment normal.         Administrative Statements   Encounter provider ROSANNA Harding    The Patient is located at Home and in the following state in which I hold an active license [place].    The patient was identified by name and date of birth. Ramsey Rodgers was informed that this is a telemedicine visit and that the visit is being conducted through the Epic Embedded platform. He agrees to proceed..  My office door was closed. No one else was in the room.  He acknowledged consent and understanding of privacy and security of the video platform. The patient has agreed to participate and understands they can discontinue the visit at any time.    I have spent a total time of 50 minutes in caring for this patient on the day of the visit/encounter including Risks and benefits of tx options, Instructions for management, Patient and family education, Importance of tx compliance, Risk factor reductions, and Impressions, not including the time spent for establishing the audio/video connection.

## 2025-05-06 NOTE — PRE-PROCEDURE INSTRUCTIONS
Pre-Surgery Instructions:   Medication Instructions    albuterol (Ventolin HFA) 90 mcg/act inhaler Uses PRN- OK to take day of surgery    ferrous sulfate 324 (65 Fe) mg Hold day of surgery.    fluticasone (FLOVENT HFA) 110 MCG/ACT inhaler Uses PRN- OK to take day of surgery    Medication instructions for day of surgery reviewed. Please take all instructed medications with only a sip of water.       You will receive a call one business day prior to surgery with an arrival time and hospital directions. If your surgery is scheduled on a Monday, the hospital will be calling you on the Friday prior to your surgery. If you have not heard from anyone by 8pm, please call the hospital supervisor through the hospital  at [phone].     Do not eat or drink anything after midnight the night before your surgery, including candy, mints, lifesavers, or chewing gum. Do not drink alcohol 24hrs before your surgery. Try not to smoke at least 24hrs before your surgery.       Follow the pre surgery showering instructions as listed in the “My Surgical Experience Booklet” or otherwise provided by your surgeon's office. Do not use a blade to shave the surgical area 1 week before surgery. It is okay to use a clean electric clippers up to 24 hours before surgery. Do not apply any lotions, creams, including makeup, cologne, deodorant, or perfumes after showering on the day of your surgery. Do not use dry shampoo, hair spray, hair gel, or any type of hair products.     Reviewed Carb Drink process, pt states has 3 from office, verb understanding.     No contact lenses, eye make-up, or artificial eyelashes. Remove nail polish, including gel polish, and any artificial, gel, or acrylic nails if possible. Remove all jewelry including rings and body piercing jewelry.     Wear causal clothing that is easy to take on and off. Consider your type of surgery.    Bowel Prep process reviewed, verb understanding.     Keep any valuables, jewelry,  piercings at home. Please bring any specially ordered equipment (sling, braces) if indicated.    Arrange for a responsible person to drive you to and from the hospital on the day of your surgery. Please confirm the visitor policy for the day of your procedure when you receive your phone call with an arrival time.     Call the surgeon's office with any new illnesses, exposures, or additional questions prior to surgery.    Please reference your “My Surgical Experience Booklet” for additional information to prepare for your upcoming surgery.

## 2025-05-07 ENCOUNTER — HOSPITAL ENCOUNTER (OUTPATIENT)
Dept: INFUSION CENTER | Facility: CLINIC | Age: 65
Discharge: HOME | End: 2025-05-07
Attending: FAMILY MEDICINE
Payer: COMMERCIAL

## 2025-05-07 ENCOUNTER — LAB REQUISITION (OUTPATIENT)
Dept: LAB | Facility: HOSPITAL | Age: 65
End: 2025-05-07
Payer: COMMERCIAL

## 2025-05-07 ENCOUNTER — TELEPHONE (OUTPATIENT)
Age: 65
End: 2025-05-07

## 2025-05-07 ENCOUNTER — APPOINTMENT (OUTPATIENT)
Dept: LAB | Facility: AMBULARY SURGERY CENTER | Age: 65
End: 2025-05-07
Attending: COLON & RECTAL SURGERY
Payer: COMMERCIAL

## 2025-05-07 ENCOUNTER — APPOINTMENT (OUTPATIENT)
Dept: LAB | Facility: AMBULARY SURGERY CENTER | Age: 65
DRG: 329 | End: 2025-05-07
Payer: COMMERCIAL

## 2025-05-07 VITALS
RESPIRATION RATE: 18 BRPM | DIASTOLIC BLOOD PRESSURE: 78 MMHG | SYSTOLIC BLOOD PRESSURE: 127 MMHG | OXYGEN SATURATION: 98 % | HEART RATE: 68 BPM | TEMPERATURE: 97.9 F

## 2025-05-07 DIAGNOSIS — C18.3 CANCER OF HEPATIC FLEXURE (HCC): ICD-10-CM

## 2025-05-07 DIAGNOSIS — C18.3 MALIGNANT NEOPLASM OF HEPATIC FLEXURE (HCC): ICD-10-CM

## 2025-05-07 DIAGNOSIS — R23.1 PALLOR: ICD-10-CM

## 2025-05-07 DIAGNOSIS — D50.9 IRON DEFICIENCY ANEMIA, UNSPECIFIED IRON DEFICIENCY ANEMIA TYPE: ICD-10-CM

## 2025-05-07 DIAGNOSIS — R53.83 OTHER FATIGUE: Primary | ICD-10-CM

## 2025-05-07 DIAGNOSIS — E61.1 LOW SERUM IRON: ICD-10-CM

## 2025-05-07 DIAGNOSIS — D64.9 LOW HEMOGLOBIN: ICD-10-CM

## 2025-05-07 LAB
ABO GROUP BLD: NORMAL
ATRIAL RATE: 62 BPM
BLD GP AB SCN SERPL QL: NEGATIVE
P AXIS: 64 DEGREES
PR INTERVAL: 132 MS
QRS AXIS: 43 DEGREES
QRSD INTERVAL: 98 MS
QT INTERVAL: 388 MS
QTC INTERVAL: 394 MS
RH BLD: POSITIVE
SPECIMEN EXPIRATION DATE: NORMAL
T WAVE AXIS: 37 DEGREES
VENTRICULAR RATE: 62 BPM

## 2025-05-07 PROCEDURE — 96365 THER/PROPH/DIAG IV INF INIT: CPT

## 2025-05-07 PROCEDURE — 86901 BLOOD TYPING SEROLOGIC RH(D): CPT | Performed by: COLON & RECTAL SURGERY

## 2025-05-07 PROCEDURE — 93010 ELECTROCARDIOGRAM REPORT: CPT | Performed by: STUDENT IN AN ORGANIZED HEALTH CARE EDUCATION/TRAINING PROGRAM

## 2025-05-07 PROCEDURE — 36415 COLL VENOUS BLD VENIPUNCTURE: CPT

## 2025-05-07 PROCEDURE — 93005 ELECTROCARDIOGRAM TRACING: CPT

## 2025-05-07 PROCEDURE — 86850 RBC ANTIBODY SCREEN: CPT | Performed by: COLON & RECTAL SURGERY

## 2025-05-07 PROCEDURE — 86900 BLOOD TYPING SEROLOGIC ABO: CPT | Performed by: COLON & RECTAL SURGERY

## 2025-05-07 RX ORDER — SODIUM CHLORIDE 9 MG/ML
20 INJECTION, SOLUTION INTRAVENOUS ONCE
Status: CANCELLED | OUTPATIENT
Start: 2025-05-14

## 2025-05-07 RX ORDER — SODIUM CHLORIDE 9 MG/ML
20 INJECTION, SOLUTION INTRAVENOUS ONCE
Status: COMPLETED | OUTPATIENT
Start: 2025-05-07 | End: 2025-05-07

## 2025-05-07 RX ADMIN — SODIUM CHLORIDE 20 ML/HR: 9 INJECTION, SOLUTION INTRAVENOUS at 13:11

## 2025-05-07 RX ADMIN — IRON SUCROSE 200 MG: 20 INJECTION, SOLUTION INTRAVENOUS at 13:11

## 2025-05-07 NOTE — TELEPHONE ENCOUNTER
Pts wife Shweta calling in, on consent form, pt went to the lab and was told there was no active orders for him to have done prior to surgery but wife reports she was told her has to do EKG and blood work prior.     I did explain the blood work was done already except the type and screen she understood and will let the lab know this. If there are any issues wife will call back.    NO further questions.

## 2025-05-07 NOTE — PROGRESS NOTES
Patient tolerated Venofer without issue. PIV removed intact, coban wrap in place. Patient confirms next appt at An infusion on 5/14 at 12PM, declines AVS.

## 2025-05-07 NOTE — PROGRESS NOTES
Patient arrives to infusion center for Venofer today. PIV placed by FELICIA RN, patient tolerated well. Patient resting on recliner chair, call bell within reach.

## 2025-05-10 ENCOUNTER — NURSE TRIAGE (OUTPATIENT)
Dept: OTHER | Facility: OTHER | Age: 65
End: 2025-05-10

## 2025-05-11 NOTE — TELEPHONE ENCOUNTER
"FOLLOW UP: Yes, please follow up with patient, patient wants to make sure that the loose tooth will not interfere with his surgery on 5/15/25.     REASON FOR CONVERSATION: Dental Pain    SYMPTOMS: Patient awoke from his nap with a loose tooth and some pain. Patient wants to know if this will interfere with his surgery on 5/15/25. Denies any fever or chills. Patient is also going to call his dentist for next steps.     OTHER: N/A    DISPOSITION Home Care    Reason for Disposition   Toothache present < 24 hours    Answer Assessment - Initial Assessment Questions  1. LOCATION: \"Which tooth is hurting?\"  (e.g., right-side/left-side, upper/lower, front/back)      Left, upper back    2. ONSET: \"When did the toothache start?\"  (e.g., hours, days)       Tonight    3. SEVERITY: \"How bad is the toothache?\"  (Scale 1-10; mild, moderate or severe)      Mild    4. SWELLING: \"Is there any visible swelling of your face?\"      Denies    5. OTHER SYMPTOMS: \"Do you have any other symptoms?\" (e.g., fever)      Denies    Protocols used: Toothache-Adult-AH    "

## 2025-05-11 NOTE — TELEPHONE ENCOUNTER
"Regarding: Pre op concerns/loose tooth  ----- Message from Alejandra F sent at 5/10/2025  8:48 PM EDT -----  \"I am calling because my  is scheduled for colorectal surgery on 5/15 and he just woke up from a nap and told me he has a loose tooth. I am concerned that because of this he may not be able to get his surgery. I am not sure if I should take him to the ER or what I should do.\"    "

## 2025-05-12 NOTE — TELEPHONE ENCOUNTER
Called patient, was an implant that fell out since initial call. He has an appointment with his dentist today.

## 2025-05-14 ENCOUNTER — HOSPITAL ENCOUNTER (OUTPATIENT)
Dept: INFUSION CENTER | Facility: CLINIC | Age: 65
Discharge: HOME | End: 2025-05-14
Attending: FAMILY MEDICINE
Payer: COMMERCIAL

## 2025-05-14 ENCOUNTER — ANESTHESIA EVENT (OUTPATIENT)
Dept: PERIOP | Facility: HOSPITAL | Age: 65
DRG: 329 | End: 2025-05-14
Payer: COMMERCIAL

## 2025-05-14 VITALS
DIASTOLIC BLOOD PRESSURE: 61 MMHG | HEART RATE: 66 BPM | SYSTOLIC BLOOD PRESSURE: 99 MMHG | TEMPERATURE: 97.7 F | OXYGEN SATURATION: 98 %

## 2025-05-14 DIAGNOSIS — D64.9 LOW HEMOGLOBIN: ICD-10-CM

## 2025-05-14 DIAGNOSIS — D50.9 IRON DEFICIENCY ANEMIA, UNSPECIFIED IRON DEFICIENCY ANEMIA TYPE: ICD-10-CM

## 2025-05-14 DIAGNOSIS — R53.83 OTHER FATIGUE: Primary | ICD-10-CM

## 2025-05-14 DIAGNOSIS — E61.1 LOW SERUM IRON: ICD-10-CM

## 2025-05-14 DIAGNOSIS — R23.1 PALLOR: ICD-10-CM

## 2025-05-14 PROCEDURE — 96365 THER/PROPH/DIAG IV INF INIT: CPT

## 2025-05-14 RX ORDER — SODIUM CHLORIDE 9 MG/ML
20 INJECTION, SOLUTION INTRAVENOUS ONCE
Status: COMPLETED | OUTPATIENT
Start: 2025-05-14 | End: 2025-05-14

## 2025-05-14 RX ORDER — SODIUM CHLORIDE 9 MG/ML
20 INJECTION, SOLUTION INTRAVENOUS ONCE
Status: CANCELLED | OUTPATIENT
Start: 2025-05-14

## 2025-05-14 RX ADMIN — SODIUM CHLORIDE 20 ML/HR: 0.9 INJECTION, SOLUTION INTRAVENOUS at 12:05

## 2025-05-14 RX ADMIN — IRON SUCROSE 200 MG: 20 INJECTION, SOLUTION INTRAVENOUS at 12:09

## 2025-05-14 NOTE — PROGRESS NOTES
Patient to infusion for venofer.  He tolerated treatment today.  He has no more ordered infusions.  He declined AVS

## 2025-05-15 ENCOUNTER — HOSPITAL ENCOUNTER (INPATIENT)
Facility: HOSPITAL | Age: 65
LOS: 4 days | Discharge: HOME | DRG: 329 | End: 2025-05-19
Attending: COLON & RECTAL SURGERY | Admitting: COLON & RECTAL SURGERY
Payer: COMMERCIAL

## 2025-05-15 ENCOUNTER — ANESTHESIA (OUTPATIENT)
Dept: PERIOP | Facility: HOSPITAL | Age: 65
DRG: 329 | End: 2025-05-15
Payer: COMMERCIAL

## 2025-05-15 DIAGNOSIS — I61.9 HEMORRHAGIC STROKE (HCC): ICD-10-CM

## 2025-05-15 DIAGNOSIS — C18.3 CANCER OF HEPATIC FLEXURE (HCC): Primary | ICD-10-CM

## 2025-05-15 DIAGNOSIS — R55 SYNCOPE, UNSPECIFIED SYNCOPE TYPE: ICD-10-CM

## 2025-05-15 LAB
ABO GROUP BLD: NORMAL
GLUCOSE SERPL-MCNC: 112 MG/DL (ref 65–140)
RH BLD: POSITIVE

## 2025-05-15 PROCEDURE — 0DBU4ZZ EXCISION OF OMENTUM, PERCUTANEOUS ENDOSCOPIC APPROACH: ICD-10-PCS | Performed by: COLON & RECTAL SURGERY

## 2025-05-15 PROCEDURE — 44205 LAP COLECTOMY PART W/ILEUM: CPT | Performed by: COLON & RECTAL SURGERY

## 2025-05-15 PROCEDURE — S2900 ROBOTIC SURGICAL SYSTEM: HCPCS | Performed by: COLON & RECTAL SURGERY

## 2025-05-15 PROCEDURE — 4A1BXSH MONITORING OF GASTROINTESTINAL VASCULAR PERFUSION USING INDOCYANINE GREEN DYE, EXTERNAL APPROACH: ICD-10-PCS | Performed by: COLON & RECTAL SURGERY

## 2025-05-15 PROCEDURE — 0DTF4ZZ RESECTION OF RIGHT LARGE INTESTINE, PERCUTANEOUS ENDOSCOPIC APPROACH: ICD-10-PCS | Performed by: COLON & RECTAL SURGERY

## 2025-05-15 PROCEDURE — 88309 TISSUE EXAM BY PATHOLOGIST: CPT | Performed by: PATHOLOGY

## 2025-05-15 PROCEDURE — 82948 REAGENT STRIP/BLOOD GLUCOSE: CPT

## 2025-05-15 PROCEDURE — 44205 LAP COLECTOMY PART W/ILEUM: CPT

## 2025-05-15 PROCEDURE — S2900 ROBOTIC SURGICAL SYSTEM: HCPCS

## 2025-05-15 PROCEDURE — 8E0W4CZ ROBOTIC ASSISTED PROCEDURE OF TRUNK REGION, PERCUTANEOUS ENDOSCOPIC APPROACH: ICD-10-PCS | Performed by: COLON & RECTAL SURGERY

## 2025-05-15 RX ORDER — ONDANSETRON 2 MG/ML
4 INJECTION INTRAMUSCULAR; INTRAVENOUS EVERY 6 HOURS PRN
Status: DISCONTINUED | OUTPATIENT
Start: 2025-05-15 | End: 2025-05-19 | Stop reason: HOSPADM

## 2025-05-15 RX ORDER — DIPHENHYDRAMINE HYDROCHLORIDE 50 MG/ML
12.5 INJECTION, SOLUTION INTRAMUSCULAR; INTRAVENOUS ONCE AS NEEDED
Status: DISCONTINUED | OUTPATIENT
Start: 2025-05-15 | End: 2025-05-15 | Stop reason: HOSPADM

## 2025-05-15 RX ORDER — ALBUTEROL SULFATE 90 UG/1
2 INHALANT RESPIRATORY (INHALATION) EVERY 4 HOURS PRN
Status: DISCONTINUED | OUTPATIENT
Start: 2025-05-15 | End: 2025-05-19 | Stop reason: HOSPADM

## 2025-05-15 RX ORDER — ONDANSETRON 2 MG/ML
4 INJECTION INTRAMUSCULAR; INTRAVENOUS ONCE AS NEEDED
Status: DISCONTINUED | OUTPATIENT
Start: 2025-05-15 | End: 2025-05-15 | Stop reason: HOSPADM

## 2025-05-15 RX ORDER — HYDROMORPHONE HCL IN WATER/PF 6 MG/30 ML
0.2 PATIENT CONTROLLED ANALGESIA SYRINGE INTRAVENOUS
Status: DISCONTINUED | OUTPATIENT
Start: 2025-05-15 | End: 2025-05-15 | Stop reason: HOSPADM

## 2025-05-15 RX ORDER — SODIUM CHLORIDE, SODIUM GLUCONATE, SODIUM ACETATE, POTASSIUM CHLORIDE, MAGNESIUM CHLORIDE, SODIUM PHOSPHATE, DIBASIC, AND POTASSIUM PHOSPHATE .53; .5; .37; .037; .03; .012; .00082 G/100ML; G/100ML; G/100ML; G/100ML; G/100ML; G/100ML; G/100ML
125 INJECTION, SOLUTION INTRAVENOUS CONTINUOUS
Status: DISCONTINUED | OUTPATIENT
Start: 2025-05-15 | End: 2025-05-16

## 2025-05-15 RX ORDER — KETAMINE HCL IN NACL, ISO-OSM 100MG/10ML
SYRINGE (ML) INJECTION AS NEEDED
Status: DISCONTINUED | OUTPATIENT
Start: 2025-05-15 | End: 2025-05-15

## 2025-05-15 RX ORDER — PROPOFOL 10 MG/ML
INJECTION, EMULSION INTRAVENOUS AS NEEDED
Status: DISCONTINUED | OUTPATIENT
Start: 2025-05-15 | End: 2025-05-15

## 2025-05-15 RX ORDER — FENTANYL CITRATE 50 UG/ML
INJECTION, SOLUTION INTRAMUSCULAR; INTRAVENOUS AS NEEDED
Status: DISCONTINUED | OUTPATIENT
Start: 2025-05-15 | End: 2025-05-15

## 2025-05-15 RX ORDER — MIDAZOLAM HYDROCHLORIDE 2 MG/2ML
INJECTION, SOLUTION INTRAMUSCULAR; INTRAVENOUS AS NEEDED
Status: DISCONTINUED | OUTPATIENT
Start: 2025-05-15 | End: 2025-05-15

## 2025-05-15 RX ORDER — GABAPENTIN 100 MG/1
100 CAPSULE ORAL 3 TIMES DAILY
Status: DISCONTINUED | OUTPATIENT
Start: 2025-05-15 | End: 2025-05-16

## 2025-05-15 RX ORDER — ACETAMINOPHEN 325 MG/1
975 TABLET ORAL ONCE
Status: COMPLETED | OUTPATIENT
Start: 2025-05-15 | End: 2025-05-15

## 2025-05-15 RX ORDER — ROCURONIUM BROMIDE 10 MG/ML
INJECTION, SOLUTION INTRAVENOUS AS NEEDED
Status: DISCONTINUED | OUTPATIENT
Start: 2025-05-15 | End: 2025-05-15

## 2025-05-15 RX ORDER — HEPARIN SODIUM 5000 [USP'U]/ML
5000 INJECTION, SOLUTION INTRAVENOUS; SUBCUTANEOUS ONCE
Status: COMPLETED | OUTPATIENT
Start: 2025-05-15 | End: 2025-05-15

## 2025-05-15 RX ORDER — LIDOCAINE HYDROCHLORIDE 10 MG/ML
INJECTION, SOLUTION EPIDURAL; INFILTRATION; INTRACAUDAL; PERINEURAL AS NEEDED
Status: DISCONTINUED | OUTPATIENT
Start: 2025-05-15 | End: 2025-05-15 | Stop reason: HOSPADM

## 2025-05-15 RX ORDER — BISACODYL 5 MG/1
10 TABLET, DELAYED RELEASE ORAL 2 TIMES DAILY
Status: DISCONTINUED | OUTPATIENT
Start: 2025-05-15 | End: 2025-05-15 | Stop reason: ALTCHOICE

## 2025-05-15 RX ORDER — CEFAZOLIN SODIUM 2 G/50ML
2000 SOLUTION INTRAVENOUS ONCE
Status: COMPLETED | OUTPATIENT
Start: 2025-05-15 | End: 2025-05-15

## 2025-05-15 RX ORDER — HYDROMORPHONE HCL/PF 1 MG/ML
SYRINGE (ML) INJECTION AS NEEDED
Status: DISCONTINUED | OUTPATIENT
Start: 2025-05-15 | End: 2025-05-15

## 2025-05-15 RX ORDER — INDOCYANINE GREEN AND WATER 25 MG
KIT INJECTION AS NEEDED
Status: DISCONTINUED | OUTPATIENT
Start: 2025-05-15 | End: 2025-05-15

## 2025-05-15 RX ORDER — ONDANSETRON 2 MG/ML
INJECTION INTRAMUSCULAR; INTRAVENOUS AS NEEDED
Status: DISCONTINUED | OUTPATIENT
Start: 2025-05-15 | End: 2025-05-15

## 2025-05-15 RX ORDER — DEXAMETHASONE SODIUM PHOSPHATE 10 MG/ML
INJECTION, SOLUTION INTRAMUSCULAR; INTRAVENOUS AS NEEDED
Status: DISCONTINUED | OUTPATIENT
Start: 2025-05-15 | End: 2025-05-15

## 2025-05-15 RX ORDER — METRONIDAZOLE 500 MG/100ML
500 INJECTION, SOLUTION INTRAVENOUS ONCE
Status: COMPLETED | OUTPATIENT
Start: 2025-05-15 | End: 2025-05-15

## 2025-05-15 RX ORDER — MAGNESIUM HYDROXIDE 1200 MG/15ML
LIQUID ORAL AS NEEDED
Status: DISCONTINUED | OUTPATIENT
Start: 2025-05-15 | End: 2025-05-15 | Stop reason: HOSPADM

## 2025-05-15 RX ORDER — FENTANYL CITRATE/PF 50 MCG/ML
25 SYRINGE (ML) INJECTION
Refills: 0 | Status: DISCONTINUED | OUTPATIENT
Start: 2025-05-15 | End: 2025-05-15 | Stop reason: HOSPADM

## 2025-05-15 RX ORDER — METHOCARBAMOL 500 MG/1
500 TABLET, FILM COATED ORAL EVERY 6 HOURS SCHEDULED
Status: DISCONTINUED | OUTPATIENT
Start: 2025-05-15 | End: 2025-05-19 | Stop reason: HOSPADM

## 2025-05-15 RX ORDER — POLYETHYLENE GLYCOL 3350 17 G/17G
255 POWDER, FOR SOLUTION ORAL ONCE
Status: DISCONTINUED | OUTPATIENT
Start: 2025-05-15 | End: 2025-05-15 | Stop reason: ALTCHOICE

## 2025-05-15 RX ORDER — GLYCOPYRROLATE 0.2 MG/ML
INJECTION INTRAMUSCULAR; INTRAVENOUS AS NEEDED
Status: DISCONTINUED | OUTPATIENT
Start: 2025-05-15 | End: 2025-05-15

## 2025-05-15 RX ORDER — HEPARIN SODIUM 5000 [USP'U]/ML
5000 INJECTION, SOLUTION INTRAVENOUS; SUBCUTANEOUS EVERY 8 HOURS SCHEDULED
Status: DISCONTINUED | OUTPATIENT
Start: 2025-05-15 | End: 2025-05-18

## 2025-05-15 RX ORDER — SODIUM CHLORIDE, SODIUM LACTATE, POTASSIUM CHLORIDE, CALCIUM CHLORIDE 600; 310; 30; 20 MG/100ML; MG/100ML; MG/100ML; MG/100ML
20 INJECTION, SOLUTION INTRAVENOUS CONTINUOUS
Status: DISCONTINUED | OUTPATIENT
Start: 2025-05-15 | End: 2025-05-16

## 2025-05-15 RX ORDER — LIDOCAINE HYDROCHLORIDE 10 MG/ML
INJECTION, SOLUTION EPIDURAL; INFILTRATION; INTRACAUDAL; PERINEURAL AS NEEDED
Status: DISCONTINUED | OUTPATIENT
Start: 2025-05-15 | End: 2025-05-15

## 2025-05-15 RX ADMIN — ROCURONIUM BROMIDE 20 MG: 10 INJECTION, SOLUTION INTRAVENOUS at 12:14

## 2025-05-15 RX ADMIN — HYDROMORPHONE HYDROCHLORIDE 0.5 MG: 1 INJECTION, SOLUTION INTRAMUSCULAR; INTRAVENOUS; SUBCUTANEOUS at 13:55

## 2025-05-15 RX ADMIN — PROPOFOL 150 MG: 10 INJECTION, EMULSION INTRAVENOUS at 11:35

## 2025-05-15 RX ADMIN — GLYCOPYRROLATE 0.1 MG: 0.2 INJECTION, SOLUTION INTRAMUSCULAR; INTRAVENOUS at 14:04

## 2025-05-15 RX ADMIN — METRONIDAZOLE: 500 SOLUTION INTRAVENOUS at 12:00

## 2025-05-15 RX ADMIN — Medication: at 15:12

## 2025-05-15 RX ADMIN — SODIUM CHLORIDE, SODIUM LACTATE, POTASSIUM CHLORIDE, AND CALCIUM CHLORIDE 20 ML/HR: .6; .31; .03; .02 INJECTION, SOLUTION INTRAVENOUS at 11:16

## 2025-05-15 RX ADMIN — METHOCARBAMOL 500 MG: 500 TABLET ORAL at 23:27

## 2025-05-15 RX ADMIN — HEPARIN SODIUM 5000 UNITS: 5000 INJECTION INTRAVENOUS; SUBCUTANEOUS at 22:30

## 2025-05-15 RX ADMIN — GABAPENTIN 100 MG: 100 CAPSULE ORAL at 18:24

## 2025-05-15 RX ADMIN — ROCURONIUM BROMIDE 50 MG: 10 INJECTION, SOLUTION INTRAVENOUS at 11:35

## 2025-05-15 RX ADMIN — DEXAMETHASONE SODIUM PHOSPHATE 10 MG: 10 INJECTION, SOLUTION INTRAMUSCULAR; INTRAVENOUS at 11:35

## 2025-05-15 RX ADMIN — METHOCARBAMOL 500 MG: 500 TABLET ORAL at 18:24

## 2025-05-15 RX ADMIN — Medication 30 MG: at 11:47

## 2025-05-15 RX ADMIN — SODIUM CHLORIDE, SODIUM LACTATE, POTASSIUM CHLORIDE, AND CALCIUM CHLORIDE: .6; .31; .03; .02 INJECTION, SOLUTION INTRAVENOUS at 11:31

## 2025-05-15 RX ADMIN — ONDANSETRON 4 MG: 2 INJECTION INTRAMUSCULAR; INTRAVENOUS at 11:35

## 2025-05-15 RX ADMIN — HYDROMORPHONE HYDROCHLORIDE 0.5 MG: 1 INJECTION, SOLUTION INTRAMUSCULAR; INTRAVENOUS; SUBCUTANEOUS at 14:39

## 2025-05-15 RX ADMIN — SODIUM CHLORIDE, SODIUM GLUCONATE, SODIUM ACETATE, POTASSIUM CHLORIDE, MAGNESIUM CHLORIDE, SODIUM PHOSPHATE, DIBASIC, AND POTASSIUM PHOSPHATE 125 ML/HR: .53; .5; .37; .037; .03; .012; .00082 INJECTION, SOLUTION INTRAVENOUS at 22:59

## 2025-05-15 RX ADMIN — ROCURONIUM BROMIDE 20 MG: 10 INJECTION, SOLUTION INTRAVENOUS at 13:56

## 2025-05-15 RX ADMIN — HYDROMORPHONE HYDROCHLORIDE 0.5 MG: 1 INJECTION, SOLUTION INTRAMUSCULAR; INTRAVENOUS; SUBCUTANEOUS at 12:41

## 2025-05-15 RX ADMIN — LIDOCAINE HYDROCHLORIDE 50 MG: 10 INJECTION, SOLUTION EPIDURAL; INFILTRATION; INTRACAUDAL; PERINEURAL at 11:35

## 2025-05-15 RX ADMIN — HEPARIN SODIUM 5000 UNITS: 5000 INJECTION INTRAVENOUS; SUBCUTANEOUS at 11:26

## 2025-05-15 RX ADMIN — SUGAMMADEX 200 MG: 100 INJECTION, SOLUTION INTRAVENOUS at 14:43

## 2025-05-15 RX ADMIN — ACETAMINOPHEN 975 MG: 325 TABLET ORAL at 11:09

## 2025-05-15 RX ADMIN — INDOCYANINE GREEN AND WATER 5 MG: KIT at 13:40

## 2025-05-15 RX ADMIN — SODIUM CHLORIDE, SODIUM GLUCONATE, SODIUM ACETATE, POTASSIUM CHLORIDE, MAGNESIUM CHLORIDE, SODIUM PHOSPHATE, DIBASIC, AND POTASSIUM PHOSPHATE 125 ML/HR: .53; .5; .37; .037; .03; .012; .00082 INJECTION, SOLUTION INTRAVENOUS at 15:19

## 2025-05-15 RX ADMIN — ROCURONIUM BROMIDE 20 MG: 10 INJECTION, SOLUTION INTRAVENOUS at 13:32

## 2025-05-15 RX ADMIN — FENTANYL CITRATE 50 MCG: 50 INJECTION INTRAMUSCULAR; INTRAVENOUS at 11:31

## 2025-05-15 RX ADMIN — GABAPENTIN 100 MG: 100 CAPSULE ORAL at 22:30

## 2025-05-15 RX ADMIN — ROCURONIUM BROMIDE 20 MG: 10 INJECTION, SOLUTION INTRAVENOUS at 13:07

## 2025-05-15 RX ADMIN — MIDAZOLAM 2 MG: 1 INJECTION INTRAMUSCULAR; INTRAVENOUS at 11:31

## 2025-05-15 RX ADMIN — CEFAZOLIN SODIUM 2000 MG: 2 SOLUTION INTRAVENOUS at 11:31

## 2025-05-15 RX ADMIN — FENTANYL CITRATE 50 MCG: 50 INJECTION INTRAMUSCULAR; INTRAVENOUS at 11:35

## 2025-05-15 RX ADMIN — ROCURONIUM BROMIDE 10 MG: 10 INJECTION, SOLUTION INTRAVENOUS at 12:40

## 2025-05-15 NOTE — PLAN OF CARE
Problem: PAIN - ADULT  Goal: Verbalizes/displays adequate comfort level or baseline comfort level  Description: Interventions:  - Encourage patient to monitor pain and request assistance  - Assess pain using appropriate pain scale  - Administer analgesics as ordered based on type and severity of pain and evaluate response  - Implement non-pharmacological measures as appropriate and evaluate response  - Consider cultural and social influences on pain and pain management  - Notify physician/advanced practitioner if interventions unsuccessful or patient reports new pain  - Educate patient/family on pain management process including their role and importance of  reporting pain   - Provide non-pharmacologic/complimentary pain relief interventions  Outcome: Progressing

## 2025-05-15 NOTE — ANESTHESIA POSTPROCEDURE EVALUATION
Post-Op Assessment Note    CV Status:  Stable  Pain Score: 0    Pain management: adequate    Multimodal analgesia used between 6 hours prior to anesthesia start to PACU discharge    Mental Status:  Alert and awake   Hydration Status:  Euvolemic and stable   PONV Controlled:  Controlled   Airway Patency:  Patent  Two or more mitigation strategies used for obstructive sleep apnea   Post Op Vitals Reviewed: Yes    No anethesia notable event occurred.    Staff: CRNA           Last Filed PACU Vitals:  Vitals Value Taken Time   Temp 97.7    Pulse 92    /72    Resp 12    SpO2 99

## 2025-05-15 NOTE — INTERVAL H&P NOTE
robotic, possible laparoscopic versus open right hemicolectomy   H&P reviewed. After examining the patient I find no changes in the patients condition since the H&P had been written.  Gen:no distress  HEENT:Perrla/eomi  CV:sinus  Lung:clear bilateral  Abd:soft,nontender  Ext: no edema      Vitals:    05/15/25 1055   BP: 119/75   Pulse: 89   Resp: 18   Temp: (!) 97.1 °F (36.2 °C)   SpO2: 99%

## 2025-05-15 NOTE — OP NOTE
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]    Operative Findings:  -tattoo located hepatic flexure,ileum to transverse colon, side-to-side functional end-to-end stapled ileocolic anastomosis,good fluorescence Firefly     -Arm 1 Suprapubic off midline to Right,8mm port  Fenestrated Bipolar  -Arm 2 Supraumbilical 12mm port camera, eventually enlarged to extraction incision  -Arm 3-left midepigastric vessel sealer, 8mm port  -Arm4-LUQ TipUp 8mm  -Assist LUQ 5mm port    Complications:   None    Procedure and Technique:  Ramsey is a 64-year-old male, no prior colonoscopy, had weakness/fatigue and hemoglobin 8.2 on CBC check 1 month  prior.  In the interval he has undergone EGD/colonoscopy.  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  CT scan without evidence of metastatic disease, interval hemoglobin 9.3 on repeat since undergoing iron infusions, already has increases in energy with this and paying attention to his diet, iron and protein intake.     He has not had prior abdominal surgery, abdominal exam is normal without tenderness.     Today we discussed with him and his wife a robotic, possible laparoscopic versus open right hemicolectomy,in a face-to-face, personal, informed consent process, the benefits, alternatives,   risks including not limited to bleeding, infection, risks of anesthesia, open surgery, DVT/PE, heart attack, stroke, death, damage to local structures(e.g. ureter, duodenum),anastomotic leak requiring reoperation, temporary versus permanent stoma. They understood these risks, signed informed consent, and wish to proceed.    He was brought to the operating room where general endotracheal anesthesia was induced without event.  He was placed in supine position pink pad positioner, attention to joint/bony extremities.  Venodynes were on and running throughout the procedure.  He was given cefazolin/Flagyl prior to skin incision, heparin subcutaneous was given preoperatively.    He was ChloraPrep abdomen and sterile draped/Ioban after appropriate drying time.  After time-out was taken per protocol procedure began with supraumbilical Marie fashion incision for 12 mm port, incising through subcutaneous tissues until fascia was observed, Kocher clamp was used to raise the umbilical stalk, and entering the peritoneum without event, 12 mm robotic trocar was placed, 15 mm CO2 pneumoperitoneum established, diagnostic laparoscopy showed hepatic flexure and retroperitoneal tattoo, no other obvious peritoneal or hepatic lesion.    Left upper quadrant 5 mm assist port and 3 additional robotic ports  were then placed as listed in the findings,remote centers were placed at appropriate level, camera was placed and aimed at the right lower quadrant after bringing the robot in and docking over the right hip.  Targeting was accomplished with appropriate hand support at the ports which had all tension released after instrument placement and targeting was completed.    Once docking was completed I moved to the console where the instruments were oriented in consistent view in the field to avoid injury.    The ileocolic fat pad was grasped and raised cephalad. The ileocolic pedicle was easily visualized and skeletonized sweeping down the retroperitoneal structures including visualized left ureter. Pedicle was ligated high between triple burns of the vessel sealer.The medial lateral dissection was then continued in this plane, noting extensive tattoo in the retroperitoneum fusing multiple planes including mesenteric to the retroperitoneum, duodenum and omentum, sweeping all retroperitoneal structures down and away until hepatic flexure was encountered and the duodenum was swept out of the field.  The mesentery to this area was also taken between double burns of the Enseal energy device, extending it to the terminal ileum,the lateral dissection was then undertaken on the white line of Toldt and medializing the entire right colon until it was ready for anastomosis, noting that the right sided omentum was taken along with the right colon given the adherence, additionally the transverse mesocolon was raised to help establish the retroperitoneal plane, middle colic was taken in a high/central ligation to the mid transverse colon, ICG injected per anesthesia for fluorescence angiography under robotic views, prompt fluorescence showing good blood flow and the demarcation line was marked with vessel sealer on the serosa.      At this point a grasper was placed on the appendix to bring it into the eventual extraction, robotic  instruments were removed under vision and robot was undocked without event and pulled away from the field.  I scrubbed back into the  field at this time.    A 6 cm extraction incision was made, (note this was extended to 8 cm given the bulk of the tumor would not easily egress the wound protector), extending from the umbilical trocar. An Thang wound retractor was immediately placed to protect the wound and the previously grasped and marked small bowel was brought into the extraction incision.    The then grasped appendix was brought into the incision and inspected. The ileum and transverse colon were lined up on the antimesenteric side of the small bowel and on the antimesenteric taenia of the large bowel. With a 3-0 Vicryl suture. Enterotomies were made and DEANGELO blue load 100 was placed and after orientation and appropriate compression time fired. This was opened showing good common channel and no hemorrhage. The Allis clamps were then used to close this open end and bring it through a TA 60 blue load, then fired after appropriate compression time. This was then cut with tissue scissors,removed passed off as specimen the corners and crossing staple line were imbricated in similar fashion with 3-0 Vicryl suture on this anastomosis.    Irrigation was undertaken and ran clean. The colon bundle closing tray was used to change all  gloves and provide a clean field for closure which was undertaken with fascial #1 PDS. Skin was closed after irrigation with 4-0 Monocryl and glue at the incision and remaining port sites.    All sponge needle instrument/RF Wand counts were correct I was present and scrubbed for the entirety of the procedure, patient was extubated and brought to the recovery room in stable condition.     I was present for the entire procedure.    Patient Disposition:  PACU     Colon Resection  Operation performed with curative intent Yes   Tumor Location (select all that apply) Hepatic flexure    Extent of colon and vascular resection (select all that apply) Extended right hemicolectomy - ileocolic, right colic (if present), middle colic              SIGNATURE: Brock Alicea MD  DATE: May 15, 2025  TIME: 2:36 PM

## 2025-05-15 NOTE — ANESTHESIA POSTPROCEDURE EVALUATION
Post-Op Assessment Note    Last Filed PACU Vitals:  Vitals Value Taken Time   Temp 97.9 °F (36.6 °C) 05/15/25 15:15   Pulse 73 05/15/25 15:46   /65 05/15/25 15:45   Resp 13 05/15/25 15:46   SpO2 97 % 05/15/25 15:46   Vitals shown include unfiled device data.    Modified Zelda:     Vitals Value Taken Time   Activity 2 05/15/25 15:45   Respiration 2 05/15/25 15:45   Circulation 2 05/15/25 15:45   Consciousness 1 05/15/25 15:45   Oxygen Saturation 1 05/15/25 15:45     Modified Zelda Score: 8

## 2025-05-15 NOTE — ANESTHESIA PREPROCEDURE EVALUATION
Medical History    History Comments   Community acquired pneumonia of left lower lobe of lung    Influenza    Bronchospasm with bronchitis, acute    Impacted cerumen of left ear    Anemia iron deficiency   Allergic rhinitis    Snoring wears mouthpiece   Procedure:  RESECTION COLON RIGHT LAPAROSCOPIC W/ ROBOTICS (Abdomen)    Relevant Problems   ANESTHESIA (within normal limits)      GI/HEPATIC   (+) Cancer of hepatic flexure (HCC)      HEMATOLOGY   (+) Iron deficiency anemia      PULMONARY   (+) Intermittent asthma        Physical Exam    Airway     Mallampati score: II  TM Distance: >3 FB  Neck ROM: full      Cardiovascular  Rhythm: regular    Dental       Pulmonary  Pulmonary exam normal     Neurological    He appears awake and alert.      Other Findings  Per pt denies anything remaining that is loose or removeablePer pt denies anything remaining that is loose or removeable        Anesthesia Plan  ASA Score- 2     Anesthesia Type- general with ASA Monitors.         Additional Monitors:     Airway Plan: oral.           Plan Factors-Exercise tolerance (METS): >4 METS.    Chart reviewed.    Patient summary reviewed.    Patient is not a current smoker.              Induction- intravenous.    Postoperative Plan- Plan for postoperative opioid use.   Monitoring Plan - Monitoring plan - standard ASA monitoring, train of four monitoring and processed EEG monitor  Post Operative Pain Plan - non-opiod analgesics, plan for postoperative opioid use and multimodal analgesia        Informed Consent- Anesthetic plan and risks discussed with patient.  I personally reviewed this patient with the CRNA. Discussed and agreed on the Anesthesia Plan with the CRNA..      NPO Status:  No vitals data found for the desired time range.

## 2025-05-16 LAB
ANION GAP SERPL CALCULATED.3IONS-SCNC: 8 MMOL/L (ref 4–13)
BASOPHILS # BLD AUTO: 0.02 THOUSANDS/ÂΜL (ref 0–0.1)
BASOPHILS NFR BLD AUTO: 0 % (ref 0–1)
BUN SERPL-MCNC: 11 MG/DL (ref 5–25)
CALCIUM SERPL-MCNC: 8.4 MG/DL (ref 8.4–10.2)
CHLORIDE SERPL-SCNC: 102 MMOL/L (ref 96–108)
CO2 SERPL-SCNC: 27 MMOL/L (ref 21–32)
CREAT SERPL-MCNC: 0.97 MG/DL (ref 0.6–1.3)
EOSINOPHIL # BLD AUTO: 0 THOUSAND/ÂΜL (ref 0–0.61)
EOSINOPHIL NFR BLD AUTO: 0 % (ref 0–6)
ERYTHROCYTE [DISTWIDTH] IN BLOOD BY AUTOMATED COUNT: 27.5 % (ref 11.6–15.1)
GFR SERPL CREATININE-BSD FRML MDRD: 82 ML/MIN/1.73SQ M
GLUCOSE SERPL-MCNC: 103 MG/DL (ref 65–140)
HCT VFR BLD AUTO: 32.3 % (ref 36.5–49.3)
HGB BLD-MCNC: 9.4 G/DL (ref 12–17)
IMM GRANULOCYTES # BLD AUTO: 0.08 THOUSAND/UL (ref 0–0.2)
IMM GRANULOCYTES NFR BLD AUTO: 1 % (ref 0–2)
LYMPHOCYTES # BLD AUTO: 0.68 THOUSANDS/ÂΜL (ref 0.6–4.47)
LYMPHOCYTES NFR BLD AUTO: 5 % (ref 14–44)
MAGNESIUM SERPL-MCNC: 1.8 MG/DL (ref 1.9–2.7)
MCH RBC QN AUTO: 22.8 PG (ref 26.8–34.3)
MCHC RBC AUTO-ENTMCNC: 29.1 G/DL (ref 31.4–37.4)
MCV RBC AUTO: 78 FL (ref 82–98)
MONOCYTES # BLD AUTO: 0.83 THOUSAND/ÂΜL (ref 0.17–1.22)
MONOCYTES NFR BLD AUTO: 6 % (ref 4–12)
NEUTROPHILS # BLD AUTO: 11.4 THOUSANDS/ÂΜL (ref 1.85–7.62)
NEUTS SEG NFR BLD AUTO: 88 % (ref 43–75)
NRBC BLD AUTO-RTO: 0 /100 WBCS
PLATELET # BLD AUTO: 294 THOUSANDS/UL (ref 149–390)
PMV BLD AUTO: 10.7 FL (ref 8.9–12.7)
POTASSIUM SERPL-SCNC: 4.2 MMOL/L (ref 3.5–5.3)
RBC # BLD AUTO: 4.12 MILLION/UL (ref 3.88–5.62)
SODIUM SERPL-SCNC: 137 MMOL/L (ref 135–147)
WBC # BLD AUTO: 13.01 THOUSAND/UL (ref 4.31–10.16)

## 2025-05-16 PROCEDURE — 80048 BASIC METABOLIC PNL TOTAL CA: CPT

## 2025-05-16 PROCEDURE — 99024 POSTOP FOLLOW-UP VISIT: CPT | Performed by: COLON & RECTAL SURGERY

## 2025-05-16 PROCEDURE — 85025 COMPLETE CBC W/AUTO DIFF WBC: CPT

## 2025-05-16 PROCEDURE — 83735 ASSAY OF MAGNESIUM: CPT

## 2025-05-16 PROCEDURE — 97166 OT EVAL MOD COMPLEX 45 MIN: CPT

## 2025-05-16 PROCEDURE — 97162 PT EVAL MOD COMPLEX 30 MIN: CPT

## 2025-05-16 RX ORDER — OXYCODONE HYDROCHLORIDE 5 MG/1
5 TABLET ORAL EVERY 4 HOURS PRN
Refills: 0 | Status: DISCONTINUED | OUTPATIENT
Start: 2025-05-16 | End: 2025-05-19 | Stop reason: HOSPADM

## 2025-05-16 RX ORDER — MAGNESIUM SULFATE HEPTAHYDRATE 40 MG/ML
2 INJECTION, SOLUTION INTRAVENOUS ONCE
Status: COMPLETED | OUTPATIENT
Start: 2025-05-16 | End: 2025-05-16

## 2025-05-16 RX ORDER — GABAPENTIN 100 MG/1
100 CAPSULE ORAL EVERY 8 HOURS
Status: DISCONTINUED | OUTPATIENT
Start: 2025-05-16 | End: 2025-05-19 | Stop reason: HOSPADM

## 2025-05-16 RX ORDER — DEXTROSE MONOHYDRATE, SODIUM CHLORIDE, AND POTASSIUM CHLORIDE 50; 1.49; 4.5 G/1000ML; G/1000ML; G/1000ML
84 INJECTION, SOLUTION INTRAVENOUS CONTINUOUS
Status: DISCONTINUED | OUTPATIENT
Start: 2025-05-16 | End: 2025-05-18

## 2025-05-16 RX ORDER — OXYCODONE HYDROCHLORIDE 10 MG/1
10 TABLET ORAL EVERY 4 HOURS PRN
Refills: 0 | Status: DISCONTINUED | OUTPATIENT
Start: 2025-05-16 | End: 2025-05-19 | Stop reason: HOSPADM

## 2025-05-16 RX ORDER — ACETAMINOPHEN 325 MG/1
975 TABLET ORAL EVERY 8 HOURS SCHEDULED
Status: DISCONTINUED | OUTPATIENT
Start: 2025-05-16 | End: 2025-05-19 | Stop reason: HOSPADM

## 2025-05-16 RX ORDER — HYDROMORPHONE HCL IN WATER/PF 6 MG/30 ML
0.2 PATIENT CONTROLLED ANALGESIA SYRINGE INTRAVENOUS
Refills: 0 | Status: DISCONTINUED | OUTPATIENT
Start: 2025-05-16 | End: 2025-05-19 | Stop reason: HOSPADM

## 2025-05-16 RX ADMIN — DEXTROSE, SODIUM CHLORIDE, AND POTASSIUM CHLORIDE 84 ML/HR: 5; .45; .15 INJECTION INTRAVENOUS at 20:19

## 2025-05-16 RX ADMIN — HEPARIN SODIUM 5000 UNITS: 5000 INJECTION INTRAVENOUS; SUBCUTANEOUS at 06:14

## 2025-05-16 RX ADMIN — METHOCARBAMOL 500 MG: 500 TABLET ORAL at 06:14

## 2025-05-16 RX ADMIN — HEPARIN SODIUM 5000 UNITS: 5000 INJECTION INTRAVENOUS; SUBCUTANEOUS at 21:48

## 2025-05-16 RX ADMIN — IRON SUCROSE 300 MG: 20 INJECTION, SOLUTION INTRAVENOUS at 14:02

## 2025-05-16 RX ADMIN — MAGNESIUM SULFATE HEPTAHYDRATE 2 G: 40 INJECTION, SOLUTION INTRAVENOUS at 10:00

## 2025-05-16 RX ADMIN — ACETAMINOPHEN 975 MG: 325 TABLET ORAL at 09:59

## 2025-05-16 RX ADMIN — DEXTROSE, SODIUM CHLORIDE, AND POTASSIUM CHLORIDE 84 ML/HR: 5; .45; .15 INJECTION INTRAVENOUS at 09:57

## 2025-05-16 RX ADMIN — GABAPENTIN 100 MG: 100 CAPSULE ORAL at 08:21

## 2025-05-16 RX ADMIN — GABAPENTIN 100 MG: 100 CAPSULE ORAL at 17:57

## 2025-05-16 RX ADMIN — HEPARIN SODIUM 5000 UNITS: 5000 INJECTION INTRAVENOUS; SUBCUTANEOUS at 14:02

## 2025-05-16 RX ADMIN — METHOCARBAMOL 500 MG: 500 TABLET ORAL at 12:11

## 2025-05-16 RX ADMIN — ACETAMINOPHEN 975 MG: 325 TABLET ORAL at 21:48

## 2025-05-16 RX ADMIN — SODIUM CHLORIDE, SODIUM GLUCONATE, SODIUM ACETATE, POTASSIUM CHLORIDE, MAGNESIUM CHLORIDE, SODIUM PHOSPHATE, DIBASIC, AND POTASSIUM PHOSPHATE 125 ML/HR: .53; .5; .37; .037; .03; .012; .00082 INJECTION, SOLUTION INTRAVENOUS at 06:30

## 2025-05-16 RX ADMIN — METHOCARBAMOL 500 MG: 500 TABLET ORAL at 17:57

## 2025-05-16 RX ADMIN — ACETAMINOPHEN 975 MG: 325 TABLET ORAL at 14:02

## 2025-05-16 NOTE — PHYSICAL THERAPY NOTE
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]    Problem List  Problem List[1]    Past Medical History  Past Medical History:   Diagnosis Date    Allergic rhinitis     Anemia     iron deficiency    Bronchospasm with bronchitis, acute 07/02/2018    Community acquired pneumonia of left lower lobe of lung 02/12/2018    Impacted cerumen of left ear 01/20/2020    Influenza 01/31/2018    Snoring     wears mouthpiece       Past Surgical History  Past Surgical History:   Procedure Laterality Date    COLONOSCOPY      DENTAL SURGERY      EGD      HERNIA REPAIR      childhood    IL LAPAROSCOPY COLECTOMY PARTIAL W/ANASTOMOSIS N/A 5/15/2025    Procedure: RESECTION COLON RIGHT LAPAROSCOPIC W/ ROBOTICS;  Surgeon: Brock Alicea MD;  Location: [facility];  Service: Colorectal    SKIN LESION EXCISION      benign        05/16/25 0938   PT Last Visit   PT Visit Date 05/16/25   Note Type   Note type Evaluation   Pain Assessment   Pain Assessment Tool 0-10   Pain Score 2   Pain Location/Orientation Location: Abdomen   Patient's Stated Pain Goal No pain   Hospital Pain Intervention(s) Repositioned;Ambulation/increased activity;Emotional support   Restrictions/Precautions   Weight Bearing Precautions Per Order No   Other Precautions Multiple lines;Pain  (PCA pump, graf)   Home Living   Type of Home House   Home Layout Two level;Performs ADLs on one level;Able to live on main level with bedroom/bathroom  (work space on lower level with FFSTE)   Bathroom Shower/Tub Walk-in shower   Bathroom Toilet Standard   Bathroom Equipment   (denies)   Bathroom Accessibility Accessible   Home Equipment   (denies)   Prior Function   Level of [place] Independent with ADLs;Independent with functional mobility;Independent with IADLS   Lives With Spouse   Receives Help From Family   IADLs Independent with driving;Independent with meal prep;Independent with medication  management   Falls in the last 6 months 0   Vocational Full time employment   General   Family/Caregiver Present No   Cognition   Overall Cognitive Status WFL   Arousal/Participation Cooperative   Orientation Level Oriented X4   Memory Within functional limits   Following Commands Follows all commands and directions without difficulty   Comments pleasant and cooperative   RLE Assessment   RLE Assessment WFL   LLE Assessment   LLE Assessment WFL   Bed Mobility   Supine to Sit 6  Modified independent   Additional items Bedrails;HOB elevated   Sit to Supine Unable to assess   Additional Comments pt supine in bed upon arrival. pt left sitting OOB in recliner with all needs within reach   Transfers   Sit to Stand 6  Modified independent   Additional items Armrests;Increased time required   Stand to Sit 6  Modified independent   Additional items Armrests;Increased time required   Additional Comments no AD   Ambulation/Elevation   Gait pattern Short stride;Decreased foot clearance   Gait Assistance 5  Supervision   Assistive Device None   Distance 2 x 120'   Stair Management Assistance 5  Supervision   Additional items Verbal cues   Stair Management Technique One rail R;Alternating pattern;Foreward   Number of Stairs 3   Balance   Static Sitting Fair +   Dynamic Sitting Fair +   Static Standing Fair   Dynamic Standing Fair   Ambulatory Fair   Activity Tolerance   Activity Tolerance Patient tolerated treatment well   Medical Staff Made Aware HEATHER Austin; co-session completed this date 2* increased medial complexity and multiple co-morbidities   Nurse Made Aware RN cleared   Assessment   Prognosis Good   Assessment Pt seen for moderate complexity PT evaluation. Pt with active PT eval/treat and OOB to chair orders. Pt is a 65 y.o. male who was admitted to [facility] on 5/15 with cancer or hepatic flexure s/p robotic R hemicolectomy on 5/15. Pt  has a past medical history of Allergic rhinitis, Anemia, Bronchospasm with  bronchitis, acute (07/02/2018), Community acquired pneumonia of left lower lobe of lung (02/12/2018), Impacted cerumen of left ear (01/20/2020), Influenza (01/31/2018), and Snoring. Pt resides with spouse in 2  with 0 SALINA and was independent prior to hospital admission. Currently upon evaluation pt performing bed mobility tasks at mod I level, funational transfers at mod I level and ambulation at S level without AD. Pt was left sitting OOB in recliner at the end of PT session with all needs within reach. Pt with no questions or concerns regarding d/c home; appears to be functioning at/ near baseline mobility levels. Pt with no further acute inpatient PT needs at this time- please re-consult if needed. PT to discharge pt and recommends home with family support. Encourage pt to ambulate at least 3-4x/day with restorative/ nursing staff while remaining in hospital. The patient's AM-PAC Basic Mobility Inpatient Short Form Raw Score is 22. A Raw score of greater than 16 suggests the patient may benefit from discharge to home. Please also refer to the recommendation of the Physical Therapist for safe discharge planning.   Goals   Patient Goals to go home   Plan   PT Frequency   (eval only- d/c IPPT)   Discharge Recommendation   Rehab Resource Intensity Level, PT No post-acute rehabilitation needs   AM-PAC Basic Mobility Inpatient   Turning in Flat Bed Without Bedrails 4   Lying on Back to Sitting on Edge of Flat Bed Without Bedrails 4   Moving Bed to Chair 4   Standing Up From Chair Using Arms 4   Walk in Room 3   Climb 3-5 Stairs With Railing 3   Basic Mobility Inpatient Raw Score 22   Basic Mobility Standardized Score 47.4   [facility] Highest Level Of Mobility   -HLM Goal 7: Walk 25 feet or more   -HLM Achieved 7: Walk 25 feet or more   Modified Carlos Scale   Modified Cut Off Scale 3           Cindy Cao, PT DPT          [1]   Patient Active Problem List  Diagnosis    Allergic rhinitis    Changing pigmented  skin lesion    Intermittent asthma    Family history of prostate carcinoma    Iron deficiency anemia    Low hemoglobin    Low serum iron    Other fatigue    Pallor    Cancer of hepatic flexure (HCC)

## 2025-05-16 NOTE — PROGRESS NOTES
Progress Note - Colorectal   Name: Ramsey Rodgers 65 y.o. male I MRN: [id number]  [facility] Date of Admission: 5/15/[facility]   Date of Service: 5/16/2025 I Hospital Day: 1    Assessment & Plan  Cancer of hepatic flexure (HCC)  S/p robotic right hemicolectomy (extended) on 5/15 for hepatic flexure adenocarcinoma.    AFVSS  UOP: 4 L    A.m. labs pending    -Advance to clear/toast/crackers  - Decrease to maintenance IV fluids  - Gonzalez in place; removed today  - Accurate I's and O's  - Dc PCA Dilaudid pump f transition to multimodal regimen  - As needed antiemetics  - Encourage out of bed and ambulation starting   - Encourage incentive spirometer use  -DVT prophylaxis with subcu heparin  - PT and OT consult for early postoperative ambulation and evaluation        Subjective   No acute events overnight.  Patient reports pain is well-controlled, has not used the PCA.  Denies nausea and vomiting.  Denies bowel function.  Tolerating clear liquids without issue.  Ambulating.    Objective :  Temp:  [97.1 °F (36.2 °C)-99.1 °F (37.3 °C)] 99.1 °F (37.3 °C)  HR:  [64-89] 64  BP: (108-131)/(65-75) 130/69  Resp:  [14-18] 17  SpO2:  [92 %-100 %] 100 %  O2 Device: Nasal cannula    I/O         05/14 0701  05/15 0700 05/15 0701  05/16 0700    P.O.  1500    I.V. (mL/kg)  4335.4 (51.7)    IV Piggyback  150    Total Intake(mL/kg)  5985.4 (71.3)    Urine (mL/kg/hr)  4100    Blood  50    Total Output  4150    Net  +1835.4                Lines/Drains/Airways       Active Status       Name Placement date Placement time Site Days    Urethral Catheter Latex;Straight-tip 16 Fr. 05/15/25  1145  Latex;Straight-tip  less than 1                  Physical Exam  General: NAD  Skin: Warm, dry, anicteric  HEENT: Normocephalic, atraumatic  CV: RRR  Pulm:  Nonlabored breathing on room air  Abd: Soft, appropriately tender, nondistended; send clean dry and intact  MSK: Symmetric, no edema, no tenderness, no deformity  Neuro: AOx3, GCS 15     Lab  "Results: I have reviewed the following results:  No results for input(s): \"WBC\", \"HGB\", \"HCT\", \"PLT\", \"BANDSPCT\", \"SODIUM\", \"K\", \"CL\", \"CO2\", \"BUN\", \"CREATININE\", \"GLUC\", \"CAIONIZED\", \"MG\", \"PHOS\", \"AST\", \"ALT\", \"ALB\", \"TBILI\", \"DBILI\", \"ALKPHOS\", \"PTT\", \"INR\", \"HSTNI0\", \"HSTNI2\", \"BNP\", \"LACTICACID\" in the last 72 hours.      "

## 2025-05-16 NOTE — UTILIZATION REVIEW
NOTIFICATION OF INPATIENT ADMISSION   AUTHORIZATION REQUEST   SERVICING FACILITY:   [facility]  Address: [address] [place] [address]  Tax ID: [id number]  NPI: [id number] ATTENDING PROVIDER:  Attending Name and NPI#: Brock Alicea Md [[id number]]  Address: [address] [place] [address]  Phone: [phone]   ADMISSION INFORMATION:  Place of Service: Inpatient [facility]  Place of Service Code: 21  Inpatient Admission Date/Time: 5/15/25  3:00 PM  Discharge Date/Time: No discharge date for patient encounter.  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     PHYSICIAN ADVISORY SERVICES:  Medical Necessity Denial & Uarn-qe-Mqro Review  Phone: [phone]  Fax: [phone]  Email: [email or web address]     DISCHARGE SUPPORT TEAM:  For Patients Discharge Needs & Updates  Phone: [phone] opt. 2 Fax: [phone]  Email: [email or web address]

## 2025-05-16 NOTE — ASSESSMENT & PLAN NOTE
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    AFVSS  UOP: 4 L    A.m. labs pending    -Advance to clear/toast/crackers  - Decrease to maintenance IV fluids  - Gonzalez in place; removed today  - Accurate I's and O's  - Dc PCA Dilaudid pump f transition to multimodal regimen  - As needed antiemetics  - Encourage out of bed and ambulation starting   - Encourage incentive spirometer use  -DVT prophylaxis with subcu heparin  - PT and OT consult for early postoperative ambulation and evaluation

## 2025-05-16 NOTE — OCCUPATIONAL THERAPY NOTE
Occupational Therapy Evaluation      Ramsey Rodgers    5/16/2025    Active Problems:    Cancer of hepatic flexure (HCC)      Past Medical History:   Diagnosis Date    Allergic rhinitis     Anemia     iron deficiency    Bronchospasm with bronchitis, acute 07/02/2018    Community acquired pneumonia of left lower lobe of lung 02/12/2018    Impacted cerumen of left ear 01/20/2020    Influenza 01/31/2018    Snoring     wears mouthpiece       Past Surgical History:   Procedure Laterality Date    COLONOSCOPY      DENTAL SURGERY      EGD      HERNIA REPAIR      childhood    GA LAPAROSCOPY COLECTOMY PARTIAL W/ANASTOMOSIS N/A 5/15/2025    Procedure: RESECTION COLON RIGHT LAPAROSCOPIC W/ ROBOTICS;  Surgeon: Brock Alicea MD;  Location: [facility];  Service: Colorectal    SKIN LESION EXCISION      benign        05/16/25 0936   OT Last Visit   OT Visit Date 05/16/25   Note Type   Note type Evaluation   Pain Assessment   Pain Assessment Tool 0-10   Pain Score 2   Pain Location/Orientation Location: Abdomen;Location: Incision   Restrictions/Precautions   Weight Bearing Precautions Per Order No   Other Precautions Multiple lines;Pain  (IV,PCA pump, graf)   Home Living   Type of Home House   Home Layout Two level;Performs ADLs on one level;Able to live on main level with bedroom/bathroom;Work area in basement   Bathroom Shower/Tub Walk-in shower   Bathroom Toilet Standard   Additional Comments no DME   Prior Function   Level of [place] Independent with ADLs;Independent with functional mobility   Lives With Spouse   IADLs Independent with driving;Independent with meal prep;Independent with medication management   Falls in the last 6 months 0   Vocational Full time employment   Comments works full time from home. 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 likes to draw and paint   Subjective  "  Subjective \"I feel pretty good\"   ADL   Eating Assistance 7  Independent   Grooming Assistance 7  Independent   UB Bathing Assistance 7  Independent   LB Bathing Assistance 5  Supervision/Setup   UB Dressing Assistance 5  Supervision/Setup   LB Dressing Assistance 5  Supervision/Setup   Toileting Assistance  5  Supervision/Setup   Bed Mobility   Supine to Sit 6  Modified independent   Additional items HOB elevated;Bedrails   Transfers   Sit to Stand 6  Modified independent   Stand to Sit 6  Modified independent   Stand pivot 7  Independent   Functional Mobility   Functional Mobility 5  Supervision   Additional Comments IV pole   Balance   Static Sitting Good   Dynamic Sitting Fair +   Static Standing Fair   Dynamic Standing Fair   Activity Tolerance   Activity Tolerance Patient tolerated treatment well   Medical Staff Made Aware co-south w PT Cindy   Nurse Made Aware ok to see   RUE Assessment   RUE Assessment WFL   LUE Assessment   LUE Assessment WFL   Hand Function   Gross Motor Coordination Functional   Fine Motor Coordination Functional   Sensation   Light Touch No apparent deficits   Proprioception   Proprioception No apparent deficits   Psychosocial   Psychosocial (WDL) WDL   Perception   Inattention/Neglect Appears intact   Cognition   Overall Cognitive Status WFL   Arousal/Participation Alert;Cooperative   Attention Within functional limits   Orientation Level Oriented X4   Memory Within functional limits   Following Commands Follows all commands and directions without difficulty   Assessment   Assessment Pt is a 65 y.o. male seen for OT evaluation s/p admit to [facility] on 5/15/2025 w/ cancer of hepatic fixture. He is now s/p R hemicolectomy. Pt has graf, IV and pca pump. He states he is doing pretty well. He has a past medical history of Allergic rhinitis, Anemia, Bronchospasm with bronchitis, acute (07/02/2018), Community acquired pneumonia of left lower lobe of lung (02/12/2018), Impacted " cerumen of left ear (01/20/2020), Influenza (01/31/2018), and Snoring.  Personal factors affecting pt at time of IE include:steps to enter environment. Prior to admission, pt was fully independent . He lives w spouse in a one story home + basement where he works from. Upon evaluation: Pt requires min assist for LB self care due to post op pain, multiple lines. He has no other OT needs. Pt educated on importance of daily activities, mobility. He verbalized good understdanding.  Based on findings from OT evaluation and functional performance deficits, pt has been identified as a  moderate complexity evaluation. The patient's raw score on the AM-PAC Daily Activity Inpatient Short Form is 23. A raw score of greater than or equal to 19 suggests the patient may benefit from discharge to home. Please refer to the recommendation of the Occupational Therapist for safe discharge planning From OT standpoint, recommendation at time of d/c would be home w no ongoing skilled OT needs. DC OT   Goals   Patient Goals TO GO HOME   Plan   OT Frequency Eval only   Discharge Recommendation   Rehab Resource Intensity Level, OT No post-acute rehabilitation needs   AM-PAC Daily Activity Inpatient   Lower Body Dressing 3   Bathing 4   Toileting 4   Upper Body Dressing 4   Grooming 4   Eating 4   Daily Activity Raw Score 23   Daily Activity Standardized Score (Calc for Raw Score >=11) 51.12

## 2025-05-16 NOTE — CASE MANAGEMENT
Case Management Assessment & Discharge Planning Note    Patient name Ramsey Rodgers  Location [facility] 911/[facility] 911-01 MRN [id number]  : 1960 Date 2025       Current Admission Date: 5/15/2025  Current Admission Diagnosis:Cancer of hepatic flexure (HCC)  Patient Active Problem List    Diagnosis Date Noted    Cancer of hepatic flexure (HCC) 2025    Iron deficiency anemia 2025    Low hemoglobin 2025    Low serum iron 2025    Other fatigue 2025    Pallor 2025    Family history of prostate carcinoma 2022    Intermittent asthma 2018    Changing pigmented skin lesion 2017    Allergic rhinitis 2012      LOS (days): 1  Geometric Mean LOS (GMLOS) (days):   Days to GMLOS:     OBJECTIVE:    Risk of Unplanned Readmission Score: 11.07         Current admission status: Inpatient       Preferred Pharmacy:   [id number]  RONAK Mohamud - [address]  [address] [place] [address] [place] [id number]  Phone: [phone] Fax: [phone]    Primary Care Provider: Mark Anthony Man DO    Primary Insurance: [employer]  Secondary Insurance:     ASSESSMENT:  Active Health Care Proxies    There are no active Health Care Proxies on file.                 Readmission Root Cause  30 Day Readmission: No    Patient Information  Admitted from:: Home  Mental Status: Alert  During Assessment patient was accompanied by: Not accompanied during assessment  Assessment information provided by:: Patient  Primary Caregiver: Self  Support Systems: Spouse/significant other  Home entry access options. Select all that apply.: Stairs  Number of steps to enter home.: 2  Type of Current Residence: 2 story home (Enter home ranch style & has a lower level. Works on lower level)  Upon entering residence, is there a bedroom on the main floor (no further steps)?: Yes  Upon entering residence, is there a bathroom on the main floor (no further steps)?: Yes  Living  Arrangements: Lives w/ Spouse/significant other    Activities of Daily Living Prior to Admission  Functional Status: Independent  Completes ADLs independently?: Yes  Ambulates independently?: Yes  Does patient use assisted devices?: No  Does patient currently own DME?: No  Does patient have a history of Outpatient Therapy (PT/OT)?: No  Does the patient have a history of Short-Term Rehab?: No  Does patient have a history of HHC?: No  Does patient currently have HHC?: No         Patient Information Continued  Income Source: Employed  Does patient have prescription coverage?: Yes  Does patient have a history of substance abuse?: No  Does patient have a history of Mental Health Diagnosis?: No         Means of Transportation  Means [facility] Transport to [facility]:: Drives Self          DISCHARGE DETAILS:    Discharge planning discussed with:: patient  Freedom of Choice: Yes     CM contacted family/caregiver?: No- see comments (pt alert & oriented)  Were Treatment Team discharge recommendations reviewed with patient/caregiver?: Yes  Did patient/caregiver verbalize understanding of patient care needs?: Yes  Were patient/caregiver advised of the risks associated with not following Treatment Team discharge recommendations?: Yes    Contacts  Patient Contacts: wife Shweta  Relationship to Patient:: Family  Contact Method: Phone  Phone Number: [phone]  Reason/Outcome: Emergency Contact             Transport at Discharge : Family      Met with pt & explained CM role. Pt lives with wife in ranch style home from street with lower level. Works in lower level. IPTA. No dme. Drives. Wife will transport home.Therapy evals pending. Cm to follow for care coordination needs.                1405 S/w white surgery this am for care coordination & anticipate no cm needs. Therapy evals pending. Cm to follow.      1408 S/w therapy & updated no needs.

## 2025-05-16 NOTE — UTILIZATION REVIEW
Initial Clinical Review    Elective IP surgical procedure  Age/Sex: 65 y.o. male  Surgery Date: 5/15/2025  Procedure:   -Diagnostic laparoscopy  -Robotic assisted extended right hemicolectomy, Including partial omentectomy  -Intraoperative fluorescence angiography with ICG  -Ileum to mid transverse colon, side to side functional end to end stapled anastomosis  Anesthesia: General  Operative Findings:   -Diagnostic laparoscopy  -Robotic assisted extended right hemicolectomy, Including partial omentectomy  -Intraoperative fluorescence angiography with ICG  -Ileum to mid transverse colon, side to side functional end to end stapled anastomosis    POD#1 Progress Note: Pt reports pain is well-controlled, has not used the PCA.  Denies n/v.  Denies bowel function.  Tolerating clear liquids without issue.  Ambulating. Abdomen soft, appropriately tender, nondistended; incision c/d/I. VSS. AM labs pending.  Advance to clear/toast/crackers. Decrease to mIVFs. DC Gonzalez. DC PCA Dilaudid pump f transition to multimodal regimen. PRN antiemetics. Encourage OOB and ambulation, incentive spirometer use. DVT pp with sq hep, SCDs. PT/OT evals for early postop ambulation       Admission Orders: Date/Time/Statement:   Admission Orders (From admission, onward)       Ordered        05/15/25 1500  Inpatient Admission  Once                          Orders Placed This Encounter   Procedures    Inpatient Admission     Standing Status:   Standing     Number of Occurrences:   1     Level of Care:   Med Surg [16]     Estimated length of stay:   More than 2 Midnights     Certification:   I certify that inpatient services are medically necessary for this patient for a duration of greater than two midnights. See H&P and MD Progress Notes for additional information about the patient's course of treatment.     Scheduled Medications:  gabapentin, 100 mg, Oral, TID  heparin (porcine), 5,000 Units, Subcutaneous, Q8H TRACEE  methocarbamol, 500 mg, Oral, Q6H  [facility]    Continuous IV Infusions:  HYDROmorphone, , Intravenous, Continuous  lactated ringers, 20 mL/hr, Intravenous, Continuous  multi-electrolyte, 125 mL/hr, Intravenous, Continuous    PRN Meds:  albuterol, 2 puff, Inhalation, Q4H PRN  lactated ringers, 1,000 mL, Intravenous, Once PRN  ondansetron, 4 mg, Intravenous, Q6H PRN  sodium chloride, 1,000 mL, Intravenous, Once PRN        Vital Signs (last 3 days)       Date/Time Temp Pulse Resp BP MAP (mmHg) SpO2 O2 Flow Rate (L/min) O2 Device Cardiac (WDL) [place] Coma Scale Score Pain    05/16/25 05:51:36 99.1 °F (37.3 °C) 64 17 130/69 89 100 % -- -- -- -- --    05/16/25 02:45:56 98.7 °F (37.1 °C) 76 -- 118/70 86 98 % -- -- -- -- --    05/15/25 22:35:39 -- 65 16 112/69 83 98 % -- -- -- -- 1    05/15/25 20:54:49 98.1 °F (36.7 °C) 73 -- 121/66 84 98 % -- -- -- -- --    05/15/25 2000 -- -- -- -- -- 100 % 2 L/min Nasal cannula -- 14 1    05/15/25 18:27:31 -- 75 16 109/71 84 97 % -- -- -- -- --    05/15/25 16:31:36 -- 76 -- 118/72 87 96 % -- -- -- -- --    05/15/25 16:23:03 -- 71 16 118/75 89 98 % -- -- -- -- --    05/15/25 16:12:33 -- 77 -- 118/75 89 97 % -- -- -- -- --    05/15/25 16:09:52 -- 80 -- 108/75 86 92 % -- -- -- -- --    05/15/25 1600 -- -- -- -- -- -- -- -- -- 14 No Pain    05/15/25 1545 -- 73 16 116/65 65 97 % 3 L/min Nasal cannula -- 14 No Pain    05/15/25 1530 -- 78 18 122/71 89 98 % 3 L/min Nasal cannula -- -- No Pain    05/15/25 1515 97.9 °F (36.6 °C) 81 15 126/70 91 95 % 3 L/min Nasal cannula WDL 14 No Pain    05/15/25 1512 -- 85 14 -- -- 97 % -- -- -- -- --    05/15/25 1500 -- 83 16 118/70 89 98 % -- None (Room air) -- 14 No Pain    05/15/25 1454 97.7 °F (36.5 °C) 87 -- 131/72 -- 98 % 4 L/min Simple mask WDL 14 No Pain    05/15/25 1109 -- -- -- -- -- -- -- -- -- -- Med Not Given for Pain - for MAR use only    05/15/25 1055 97.1 °F (36.2 °C) 89 18 119/75 -- 99 % -- None (Room air) -- -- No Pain          Weight (last 2 days)       Date/Time Weight     05/15/25 1055 83.9 (185)            Pertinent Labs/Diagnostic Test Results:     Results from last 7 days   Lab Units 05/16/25  0544   WBC Thousand/uL 13.01*   HEMOGLOBIN g/dL 9.4*   HEMATOCRIT % 32.3*   PLATELETS Thousands/uL 294   TOTAL NEUT ABS Thousands/µL 11.40*       Results from last 7 days   Lab Units 05/16/25  0544   SODIUM mmol/L 137   POTASSIUM mmol/L 4.2   CHLORIDE mmol/L 102   CO2 mmol/L 27   ANION GAP mmol/L 8   BUN mg/dL 11   CREATININE mg/dL 0.97   EGFR ml/min/1.73sq m 82   CALCIUM mg/dL 8.4   MAGNESIUM mg/dL 1.8*       Results from last 7 days   Lab Units 05/15/25  1459   POC GLUCOSE mg/dl 112     Results from last 7 days   Lab Units 05/16/25  0544   GLUCOSE RANDOM mg/dL 103         Network Utilization Review Department  ATTENTION: Please call with any questions or concerns to [phone] and carefully listen to the prompts so that you are directed to the right person. All voicemails are confidential.   For Discharge needs, contact Care Management DC Support Team at [phone] opt. 2  Send all requests for admission clinical reviews, approved or denied determinations and any other requests to dedicated fax number below belonging to the campus where the patient is receiving treatment. List of dedicated fax numbers for the Facilities:  FACILITY NAME UR FAX NUMBER   ADMISSION DENIALS (Administrative/Medical Necessity) [phone]   DISCHARGE SUPPORT TEAM (NETWORK) [phone]   PARENT CHILD HEALTH (Maternity/NICU/Pediatrics) [phone]   [facility] [phone]   [facility] [phone]   [facility] [phone]   [facility] [phone]   [facility] [phone]   [facility] [phone]   [facility] [phone]   [facility] [phone]   [facility]  [place] [phone]   [facility] [phone]   [facility] [phone]   [facility] [phone]

## 2025-05-16 NOTE — PLAN OF CARE
Problem: PAIN - ADULT  Goal: Verbalizes/displays adequate comfort level or baseline comfort level  Description: Interventions:  - Encourage patient to monitor pain and request assistance  - Assess pain using appropriate pain scale  - Administer analgesics as ordered based on type and severity of pain and evaluate response  - Implement non-pharmacological measures as appropriate and evaluate response  - Consider cultural and social influences on pain and pain management  - Notify physician/advanced practitioner if interventions unsuccessful or patient reports new pain  - Educate patient/family on pain management process including their role and importance of  reporting pain   - Provide non-pharmacologic/complimentary pain relief interventions  Outcome: Progressing     Problem: INFECTION - ADULT  Goal: Absence or prevention of progression during hospitalization  Description: INTERVENTIONS:  - Assess and monitor for signs and symptoms of infection  - Monitor lab/diagnostic results  - Monitor all insertion sites, i.e. indwelling lines, tubes, and drains  - Monitor endotracheal if appropriate and nasal secretions for changes in amount and color  - [place] appropriate cooling/warming therapies per order  - Administer medications as ordered  - Instruct and encourage patient and family to use good hand hygiene technique  - Identify and instruct in appropriate isolation precautions for identified infection/condition  Outcome: Progressing     Problem: SAFETY ADULT  Goal: Maintain or return to baseline ADL function  Description: INTERVENTIONS:  -  Assess patient's ability to carry out ADLs; assess patient's baseline for ADL function and identify physical deficits which impact ability to perform ADLs (bathing, care of mouth/teeth, toileting, grooming, dressing, etc.)  - Assess/evaluate cause of self-care deficits   - Assess range of motion  - Assess patient's mobility; develop plan if impaired  - Assess patient's need for  assistive devices and provide as appropriate  - Encourage maximum independence but intervene and supervise when necessary  - Involve family in performance of ADLs  - Assess for home care needs following discharge   - Consider OT consult to assist with ADL evaluation and planning for discharge  - Provide patient education as appropriate  - Monitor functional capacity and physical performance, use of AM PAC & JH-HLM   - Monitor gait, balance and fatigue with ambulation    Outcome: Progressing

## 2025-05-17 ENCOUNTER — APPOINTMENT (INPATIENT)
Dept: RADIOLOGY | Facility: HOSPITAL | Age: 65
DRG: 329 | End: 2025-05-17
Payer: COMMERCIAL

## 2025-05-17 PROBLEM — I61.9 HEMORRHAGE IN THE BRAIN (HCC): Status: ACTIVE | Noted: 2025-05-17

## 2025-05-17 LAB
ANION GAP SERPL CALCULATED.3IONS-SCNC: 4 MMOL/L (ref 4–13)
ANION GAP SERPL CALCULATED.3IONS-SCNC: 6 MMOL/L (ref 4–13)
BASOPHILS # BLD AUTO: 0.05 THOUSANDS/ÂΜL (ref 0–0.1)
BASOPHILS NFR BLD AUTO: 1 % (ref 0–1)
BUN SERPL-MCNC: 10 MG/DL (ref 5–25)
BUN SERPL-MCNC: 8 MG/DL (ref 5–25)
CALCIUM SERPL-MCNC: 8 MG/DL (ref 8.4–10.2)
CALCIUM SERPL-MCNC: 8.6 MG/DL (ref 8.4–10.2)
CHLORIDE SERPL-SCNC: 105 MMOL/L (ref 96–108)
CHLORIDE SERPL-SCNC: 105 MMOL/L (ref 96–108)
CO2 SERPL-SCNC: 26 MMOL/L (ref 21–32)
CO2 SERPL-SCNC: 28 MMOL/L (ref 21–32)
CREAT SERPL-MCNC: 0.86 MG/DL (ref 0.6–1.3)
CREAT SERPL-MCNC: 0.92 MG/DL (ref 0.6–1.3)
EOSINOPHIL # BLD AUTO: 0 THOUSAND/ÂΜL (ref 0–0.61)
EOSINOPHIL NFR BLD AUTO: 0 % (ref 0–6)
ERYTHROCYTE [DISTWIDTH] IN BLOOD BY AUTOMATED COUNT: 27.4 % (ref 11.6–15.1)
GFR SERPL CREATININE-BSD FRML MDRD: 88 ML/MIN/1.73SQ M
GFR SERPL CREATININE-BSD FRML MDRD: 92 ML/MIN/1.73SQ M
GLUCOSE SERPL-MCNC: 100 MG/DL (ref 65–140)
GLUCOSE SERPL-MCNC: 121 MG/DL (ref 65–140)
GLUCOSE SERPL-MCNC: 84 MG/DL (ref 65–140)
HCT VFR BLD AUTO: 28.3 % (ref 36.5–49.3)
HCT VFR BLD AUTO: 33.3 % (ref 36.5–49.3)
HGB BLD-MCNC: 8.3 G/DL (ref 12–17)
HGB BLD-MCNC: 9.7 G/DL (ref 12–17)
IMM GRANULOCYTES # BLD AUTO: 0.04 THOUSAND/UL (ref 0–0.2)
IMM GRANULOCYTES NFR BLD AUTO: 0 % (ref 0–2)
LYMPHOCYTES # BLD AUTO: 1.35 THOUSANDS/ÂΜL (ref 0.6–4.47)
LYMPHOCYTES NFR BLD AUTO: 12 % (ref 14–44)
MAGNESIUM SERPL-MCNC: 1.7 MG/DL (ref 1.9–2.7)
MAGNESIUM SERPL-MCNC: 1.9 MG/DL (ref 1.9–2.7)
MCH RBC QN AUTO: 23 PG (ref 26.8–34.3)
MCHC RBC AUTO-ENTMCNC: 29.1 G/DL (ref 31.4–37.4)
MCV RBC AUTO: 79 FL (ref 82–98)
MONOCYTES # BLD AUTO: 0.74 THOUSAND/ÂΜL (ref 0.17–1.22)
MONOCYTES NFR BLD AUTO: 7 % (ref 4–12)
NEUTROPHILS # BLD AUTO: 8.85 THOUSANDS/ÂΜL (ref 1.85–7.62)
NEUTS SEG NFR BLD AUTO: 80 % (ref 43–75)
NRBC BLD AUTO-RTO: 0 /100 WBCS
PHOSPHATE SERPL-MCNC: 1.3 MG/DL (ref 2.3–4.1)
PLATELET # BLD AUTO: 300 THOUSANDS/UL (ref 149–390)
PMV BLD AUTO: 10.5 FL (ref 8.9–12.7)
POTASSIUM SERPL-SCNC: 3.5 MMOL/L (ref 3.5–5.3)
POTASSIUM SERPL-SCNC: 3.8 MMOL/L (ref 3.5–5.3)
PROLACTIN SERPL-MCNC: 76.54 NG/ML (ref 2.64–13.13)
RBC # BLD AUTO: 4.22 MILLION/UL (ref 3.88–5.62)
SODIUM SERPL-SCNC: 135 MMOL/L (ref 135–147)
SODIUM SERPL-SCNC: 139 MMOL/L (ref 135–147)
WBC # BLD AUTO: 11.03 THOUSAND/UL (ref 4.31–10.16)

## 2025-05-17 PROCEDURE — 85018 HEMOGLOBIN: CPT

## 2025-05-17 PROCEDURE — 80048 BASIC METABOLIC PNL TOTAL CA: CPT

## 2025-05-17 PROCEDURE — 85014 HEMATOCRIT: CPT

## 2025-05-17 PROCEDURE — 83735 ASSAY OF MAGNESIUM: CPT | Performed by: PHYSICIAN ASSISTANT

## 2025-05-17 PROCEDURE — 84146 ASSAY OF PROLACTIN: CPT

## 2025-05-17 PROCEDURE — 99223 1ST HOSP IP/OBS HIGH 75: CPT | Performed by: STUDENT IN AN ORGANIZED HEALTH CARE EDUCATION/TRAINING PROGRAM

## 2025-05-17 PROCEDURE — A9585 GADOBUTROL INJECTION: HCPCS | Performed by: RADIOLOGY

## 2025-05-17 PROCEDURE — NC001 PR NO CHARGE: Performed by: STUDENT IN AN ORGANIZED HEALTH CARE EDUCATION/TRAINING PROGRAM

## 2025-05-17 PROCEDURE — 70553 MRI BRAIN STEM W/O & W/DYE: CPT

## 2025-05-17 PROCEDURE — 99024 POSTOP FOLLOW-UP VISIT: CPT | Performed by: COLON & RECTAL SURGERY

## 2025-05-17 PROCEDURE — 70496 CT ANGIOGRAPHY HEAD: CPT

## 2025-05-17 PROCEDURE — 70498 CT ANGIOGRAPHY NECK: CPT

## 2025-05-17 PROCEDURE — 83735 ASSAY OF MAGNESIUM: CPT

## 2025-05-17 PROCEDURE — 80048 BASIC METABOLIC PNL TOTAL CA: CPT | Performed by: PHYSICIAN ASSISTANT

## 2025-05-17 PROCEDURE — 82948 REAGENT STRIP/BLOOD GLUCOSE: CPT

## 2025-05-17 PROCEDURE — 70450 CT HEAD/BRAIN W/O DYE: CPT

## 2025-05-17 PROCEDURE — 84100 ASSAY OF PHOSPHORUS: CPT

## 2025-05-17 PROCEDURE — 85025 COMPLETE CBC W/AUTO DIFF WBC: CPT | Performed by: PHYSICIAN ASSISTANT

## 2025-05-17 RX ORDER — SODIUM CHLORIDE, SODIUM GLUCONATE, SODIUM ACETATE, POTASSIUM CHLORIDE, MAGNESIUM CHLORIDE, SODIUM PHOSPHATE, DIBASIC, AND POTASSIUM PHOSPHATE .53; .5; .37; .037; .03; .012; .00082 G/100ML; G/100ML; G/100ML; G/100ML; G/100ML; G/100ML; G/100ML
1000 INJECTION, SOLUTION INTRAVENOUS ONCE
Status: COMPLETED | OUTPATIENT
Start: 2025-05-17 | End: 2025-05-17

## 2025-05-17 RX ORDER — MAGNESIUM SULFATE HEPTAHYDRATE 40 MG/ML
2 INJECTION, SOLUTION INTRAVENOUS ONCE
Status: COMPLETED | OUTPATIENT
Start: 2025-05-17 | End: 2025-05-18

## 2025-05-17 RX ORDER — GADOBUTROL 604.72 MG/ML
8 INJECTION INTRAVENOUS
Status: COMPLETED | OUTPATIENT
Start: 2025-05-17 | End: 2025-05-17

## 2025-05-17 RX ADMIN — GABAPENTIN 100 MG: 100 CAPSULE ORAL at 17:42

## 2025-05-17 RX ADMIN — GABAPENTIN 100 MG: 100 CAPSULE ORAL at 08:53

## 2025-05-17 RX ADMIN — ACETAMINOPHEN 975 MG: 325 TABLET ORAL at 21:37

## 2025-05-17 RX ADMIN — IOHEXOL 85 ML: 350 INJECTION, SOLUTION INTRAVENOUS at 10:02

## 2025-05-17 RX ADMIN — ACETAMINOPHEN 975 MG: 325 TABLET ORAL at 06:02

## 2025-05-17 RX ADMIN — IRON SUCROSE 300 MG: 20 INJECTION, SOLUTION INTRAVENOUS at 08:53

## 2025-05-17 RX ADMIN — POTASSIUM PHOSPHATE, MONOBASIC POTASSIUM PHOSPHATE, DIBASIC 30 MMOL: 224; 236 INJECTION, SOLUTION, CONCENTRATE INTRAVENOUS at 20:29

## 2025-05-17 RX ADMIN — ACETAMINOPHEN 975 MG: 325 TABLET ORAL at 13:01

## 2025-05-17 RX ADMIN — METHOCARBAMOL 500 MG: 500 TABLET ORAL at 00:45

## 2025-05-17 RX ADMIN — METHOCARBAMOL 500 MG: 500 TABLET ORAL at 17:42

## 2025-05-17 RX ADMIN — GADOBUTROL 8 ML: 604.72 INJECTION INTRAVENOUS at 23:08

## 2025-05-17 RX ADMIN — METHOCARBAMOL 500 MG: 500 TABLET ORAL at 06:02

## 2025-05-17 RX ADMIN — MAGNESIUM SULFATE HEPTAHYDRATE 2 G: 40 INJECTION, SOLUTION INTRAVENOUS at 19:22

## 2025-05-17 RX ADMIN — SODIUM CHLORIDE, SODIUM GLUCONATE, SODIUM ACETATE, POTASSIUM CHLORIDE, MAGNESIUM CHLORIDE, SODIUM PHOSPHATE, DIBASIC, AND POTASSIUM PHOSPHATE 1000 ML: .53; .5; .37; .037; .03; .012; .00082 INJECTION, SOLUTION INTRAVENOUS at 10:27

## 2025-05-17 RX ADMIN — DEXTROSE, SODIUM CHLORIDE, AND POTASSIUM CHLORIDE 84 ML/HR: 5; .45; .15 INJECTION INTRAVENOUS at 13:02

## 2025-05-17 RX ADMIN — METHOCARBAMOL 500 MG: 500 TABLET ORAL at 13:01

## 2025-05-17 RX ADMIN — GABAPENTIN 100 MG: 100 CAPSULE ORAL at 00:46

## 2025-05-17 RX ADMIN — DEXTROSE, SODIUM CHLORIDE, AND POTASSIUM CHLORIDE 84 ML/HR: 5; .45; .15 INJECTION INTRAVENOUS at 07:38

## 2025-05-17 RX ADMIN — HEPARIN SODIUM 5000 UNITS: 5000 INJECTION INTRAVENOUS; SUBCUTANEOUS at 06:02

## 2025-05-17 RX ADMIN — SODIUM PHOSPHATE, MONOBASIC, MONOHYDRATE AND SODIUM PHOSPHATE, DIBASIC, ANHYDROUS 30 MMOL: 142; 276 INJECTION, SOLUTION INTRAVENOUS at 21:37

## 2025-05-17 NOTE — ASSESSMENT & PLAN NOTE
65 y.o. 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 LKW 9:00AM. Initial presenting deficits: transient word finding difficulty, generalized weakness-unable to stand up. Sxs lasted for 2 mins and resolved by the time of neuro evaluation.     Workup:  NIHSS-0.   BP: 75/50.   NCCTH: R thalamocapsular bleed, ICH 0.   Not a TNK Candidate due to IPH.   CTA h/n: unremarkable.  Neuro exam w/out focal deficits.  MRIB w w/o: stable R thalamic blood    Impression:   Episode of transient word finding difficulty and generalized weakness in setting of low BP likely due to hypoperfusion; the NCCTH, however, reveals a R thalamocapsular IPH which is unlikely to be related to pt sxs for which neuro was consulted. While this is a typical location for hypertensive bleeds, pt has not had elevated BP readings, instead hypotensive episode which likely triggered the stroke alert. No evidence of metastatic dx in the brain nor of obvious underlying infarction or vascular malformation. Expect source of small bleed to be better determined by o/p MRIB in several wks.    Plan:  - Discussed w/ attending physician Dr. Joe Hogan  - Obtain MRI brain w/ w/o repeat in 3-4 wks as ordered  - F/u in 6 wks after completing the MRIB  - From neuro perspective should remain off AP/AC  - AP/AC can be considered per NSGY or o/p neuro  - NSGY aware, no intervention indicated  - -140, MAP>65, avoid hypotension  - Stat CTA h/n if new neuro deficits or worsening neuro exam  - PT/OT/PMR/ST evals per primary team  - Hold medical DVT ppx for now, SCDs only  - Glycemic control, goal 140-180

## 2025-05-17 NOTE — CONSULTS
Stroke Alert- Neurology Consult     Name: Ramsey Rodgers   Age & Sex: 65 y.o. male   MRN: [id number]  Unit/Bed#: [facility] 911-01   Encounter: [id number]  Length of Stay: 2    Assessment plan:    R thalamocapsular IPH  Assessment & Plan  65 y.o. 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 LKW 9:00AM. Initial presenting deficits: transient word finding difficulty, generalized weakness-unable to stand up. Syx lasted for 2 mins and resolved by the time of neuro evaluation.   NIHSS-0. BP: 75/50.   NCCTH: R thalamocapsular bleed, ICH 0. Not a TNK Candidate due to IPH.   CTA h/n: unremarkable.  Neuro exam w/out focal deficits.    IMP: Episode of transient word finding difficulty and generalized weakness in setting of low BP likely due to hypoperfusion; the NCCTH, however, reveals a R thalamocapsular IPH. While this is a typical location for hypertensive bleeds, pt has not had elevated BP readings. There does not seem to be evidence of metastatic dx elsewhere in the brain either, but this lesion may represent a met that bled. Another possibility is an ischemic stroke (due to underlying hypercoag state of malignancy) that underwent hemorrhagic transformation. MRI brain+Lewis should help with determining the chronicity of the bleed and help elucidate the etiology.     Plan:  - Discussed w/ attending physician: Dr. Joe Hogan.  - Stroke pathway  - -140, MAP>65. Avoid hypotension.  - Repeat CTH 6hrs after the initial one  - MRI brain + Lewis  - Recommend NSGY consult   - Stat CTA h/n in case of new neuro deficits or worsening neuro exam.  - Monitor for signs of ICP increase (bradycardia, HTN, changes in neuro exam, increased tone, pupillary changes); monitor for vasospasm and delayed cerebral ischemia  - PT/OT/PMR/ST    - No anti-thrombotics.   - Hold DVT ppx for now. SCDs only.   - Tight glycemic control,  goal 140-180. Monitor temperature, tylenol PRN.      Recommendations for outpatient neurological follow up have yet to be determined.   Pending for discharge: Stroke work-up    Subjective:   Reason for Consult / Principal Problem:  stroke alert  Stroke alert called: 05/17/25 at 9:26AM   Neurology stroke alert response: Immediate  Hx and PE limited by:  none  Time last known well: 9:00AM    Review of previous medical records was completed as available.    HPI: Ramsey Rodgers is a 65 y.o. 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  LKW 9:00AM. Initial presenting deficits: transient word finding difficulty, generalized weakness-unable to stand up.      Patient reported being in normal state of health until 9:00AM. Was getting venofer infusion when syx were noted. Deficits resolved within 2 mins and patient was back to baseline.   No antecedent trauma, infections, med changes earlier in the morning. BP during the episode: 75/50. Pulse 58. Glucose 121.  NIHSS 0. His deficits had entirely resolved by the time of my evaluation as confirmed by wife.   CTH wo contrast showed R thalamocapsular bleed.   CTA h/n unremarkable.    Not a TNK candidate due to: IPH on imaging.     Neurology consulted for workup, management of stroke.     Consults    Historical Information   Past Medical History:   Diagnosis Date    Allergic rhinitis     Anemia     iron deficiency    Bronchospasm with bronchitis, acute 07/02/2018    Community acquired pneumonia of left lower lobe of lung 02/12/2018    Impacted cerumen of left ear 01/20/2020    Influenza 01/31/2018    Snoring     wears mouthpiece     Past Surgical History:   Procedure Laterality Date    COLONOSCOPY      DENTAL SURGERY      EGD      HERNIA REPAIR      childhood    CT LAPAROSCOPY COLECTOMY PARTIAL W/ANASTOMOSIS N/A 5/15/2025    Procedure: RESECTION COLON RIGHT LAPAROSCOPIC W/  ROBOTICS;  Surgeon: Brock Alicea MD;  Location: [facility];  Service: Colorectal    SKIN LESION EXCISION      benign     Social History   Social History     Substance and Sexual Activity   Alcohol Use Never     Social History     Substance and Sexual Activity   Drug Use Never     E-Cigarette/Vaping    E-Cigarette Use Never User      E-Cigarette/Vaping Substances    Nicotine No     THC No     CBD No     Flavoring No     Other No     Unknown No      Tobacco Use History[1]  Family History:   Family History   Problem Relation Age of Onset    Diabetes type I Mother     Prostate cancer Father     Coronary artery disease Maternal Grandfather     Lung cancer Paternal Grandmother     Cancer Paternal Grandfather     Colon cancer Neg Hx     Testicular cancer Neg Hx      Meds/Allergies   all current active meds have been reviewed, current meds:   Current Facility-Administered Medications:     acetaminophen (TYLENOL) tablet 975 mg, Q8H TRACEE    albuterol (PROVENTIL HFA,VENTOLIN HFA) inhaler 2 puff, Q4H PRN    dextrose 5 % and sodium chloride 0.45 % with KCl 20 mEq/L infusion, Continuous, Last Rate: 84 mL/hr (05/17/25 1302)    gabapentin (NEURONTIN) capsule 100 mg, Q8H    [Held by provider] heparin (porcine) subcutaneous injection 5,000 Units, Q8H TRACEE    HYDROmorphone HCl (DILAUDID) injection 0.2 mg, Q3H PRN    [Held by provider] iron sucrose (VENOFER) 300 mg in sodium chloride 0.9 % 250 mL IVPB, Daily, Last Rate: 300 mg (05/17/25 0853)    methocarbamol (ROBAXIN) tablet 500 mg, Q6H TRACEE    ondansetron (ZOFRAN) injection 4 mg, Q6H PRN    oxyCODONE (ROXICODONE) immediate release tablet 10 mg, Q4H PRN    oxyCODONE (ROXICODONE) IR tablet 5 mg, Q4H PRN, and PTA meds:   Prior to Admission Medications   Prescriptions Last Dose Informant Patient Reported? Taking?   albuterol (Ventolin HFA) 90 mcg/act inhaler Past Month  No Yes   Sig: Inhale 2 puffs every 4 (four) hours as needed for wheezing   ferrous sulfate 324 (65 Fe) mg Past Month   No Yes   Sig: Take 1 tablet (324 mg total) by mouth daily before breakfast   fluticasone (FLOVENT HFA) 110 MCG/ACT inhaler Unknown  No No   Sig: Inhale 2 puffs 2 (two) times a day Rinse mouth after use.      Facility-Administered Medications: None     Allergies[2]    Review of Systems see above.     Objective:     Patient ID: Ramsey Rodgers is a 65 y.o. male.    Vitals:   Vitals:    25 0934 25 0936 25 0937 25 1134   BP: 97/53 97/53 104/64 145/82   Pulse:  69  93   Resp:    16   Temp:    97.9 °F (36.6 °C)   TempSrc:       SpO2:  93%  98%   Weight:       Height:          Body mass index is 24.41 kg/m².     Intake/Output Summary (Last 24 hours) at 2025 1528  Last data filed at 2025 1417  Gross per 24 hour   Intake 2780 ml   Output 1650 ml   Net 1130 ml       Temperature:   Temp (24hrs), Av.9 °F (37.2 °C), Min:97.9 °F (36.6 °C), Max:99.8 °F (37.7 °C)    Temperature: 97.9 °F (36.6 °C)    Invasive Devices:   Invasive Devices       Peripheral Intravenous Line  Duration             Peripheral IV 05/15/25 Left Arm 2 days                    Physical Exam   VS reviewed   Gen: NAD.   NCAT    Neurologic Exam   MS: AAOx3. Speech fluent w/o errors. Normal naming, reading, and repetition. Follows lateralized commands.   CN: VFF. PERRL. EOMI. No nystagmus. Face symmetric. Intact LT V1-3 b/l. No dysarthria. Tongue midline.   MOT: Strength 5/5 t/o. No drift or orbiting. Normal bulk and tone. No abnormal movements.   SENS: Intact to LT t/o.  No extinction to DSS.  CEREB: No truncal ataxia. Intact FNF b/l.   GAIT: Able to stand up without experiencing any syx and take few steps.      NIH Stroke Scale    1a.Level of Consciousness 0- ALERT     1b. LOC Questions: Month, Age 0- Answers both month, age correctly     1c. LOC Commands 0- Performs both tasks     2. Best Gaze: Horizontal eye movements 0- Normal      3. Visual  0 - No visual loss     4. Facial Palsy 0- Normal, symmetric movements     5. Motor  Arm RIGHT ARM  0- No drift - holds 90 or 45deg for full 10s    LEFT ARM  0- No drift - holds 90 or 45deg for full 10s     6. Motor Leg RIGHT LEG  0- No drift, holds 30deg for 5s    LEFT LEG  0- No drift, holds 30deg for 5s     7. Limb Ataxia  0- Absent      8. Sensory 0- Normal     9. Best Language  0- No aphasia; normal.      10. Dysarthria 0- Normal.      11. Extinction and Inattention (formerly Neglect) 0- No abnormality.   1- Visual, tactile, auditory, spatial, or personal inattention or extinction to bilateral simultaneous stimulation in one of the sensory modalities.   2- Profound jose-inattention or extinction to more than one modality; does not recognize own hand or orients to only one side of space.      TOTAL SCORE: 0    Time NIHSS was completed: 9: 35AM     Labs:   Results from last 7 days   Lab Units 05/17/25  1042 05/17/25  0509 05/16/25  0544   WBC Thousand/uL  --  11.03* 13.01*   HEMOGLOBIN g/dL 8.3* 9.7* 9.4*   HEMATOCRIT % 28.3* 33.3* 32.3*   PLATELETS Thousands/uL  --  300 294   SEGS PCT %  --  80* 88*   MONO PCT %  --  7 6   EOS PCT %  --  0 0      Results from last 7 days   Lab Units 05/17/25  1042 05/17/25  0509 05/16/25  0544   POTASSIUM mmol/L 3.5 3.8 4.2   CHLORIDE mmol/L 105 105 102   CO2 mmol/L 26 28 27   BUN mg/dL 10 8 11   CREATININE mg/dL 0.86 0.92 0.97   CALCIUM mg/dL 8.0* 8.6 8.4     Results from last 7 days   Lab Units 05/17/25  1042 05/17/25  0509 05/16/25  0544   MAGNESIUM mg/dL 1.7* 1.9 1.8*     Results from last 7 days   Lab Units 05/17/25  1042   PHOSPHORUS mg/dL 1.3*                    Imaging and diagnostic studies:    Radiology Results:   CTA stroke alert (head/neck)   Final Result by Brayan Bennett DO (05/17 1020)      Unremarkable CTA neck and brain.               Findings were directly discussed with Joe Hogan at 10:10 a.m.      Workstation performed: [id number]         CT stroke alert brain   Final Result by Brayan Bennett DO (05/17 1014)       Subcentimeter acute parenchymal hemorrhage within the right thalamus see series 2 image 20.      Findings were directly discussed with Joe Hogan at approximately 10:10 a.m.      Workstation performed: [id number]         MRI inpatient order    (Results Pending)         Active medications:    Current Facility-Administered Medications:     acetaminophen (TYLENOL) tablet 975 mg, Q8H TRACEE    albuterol (PROVENTIL HFA,VENTOLIN HFA) inhaler 2 puff, Q4H PRN    dextrose 5 % and sodium chloride 0.45 % with KCl 20 mEq/L infusion, Continuous, Last Rate: 84 mL/hr (05/17/25 1302)    gabapentin (NEURONTIN) capsule 100 mg, Q8H    [Held by provider] heparin (porcine) subcutaneous injection 5,000 Units, Q8H TRACEE    HYDROmorphone HCl (DILAUDID) injection 0.2 mg, Q3H PRN    [Held by provider] iron sucrose (VENOFER) 300 mg in sodium chloride 0.9 % 250 mL IVPB, Daily, Last Rate: 300 mg (05/17/25 0853)    methocarbamol (ROBAXIN) tablet 500 mg, Q6H TRACEE    ondansetron (ZOFRAN) injection 4 mg, Q6H PRN    oxyCODONE (ROXICODONE) immediate release tablet 10 mg, Q4H PRN    oxyCODONE (ROXICODONE) IR tablet 5 mg, Q4H PRN    Prior to Admission medications    Medication Sig Start Date End Date Taking? Authorizing Provider   albuterol (Ventolin HFA) 90 mcg/act inhaler Inhale 2 puffs every 4 (four) hours as needed for wheezing 9/3/24  Yes Mark Anthony Man, DO   ferrous sulfate 324 (65 Fe) mg Take 1 tablet (324 mg total) by mouth daily before breakfast 4/2/25  Yes Mark Anthony Man, DO   fluticasone (FLOVENT HFA) 110 MCG/ACT inhaler Inhale 2 puffs 2 (two) times a day Rinse mouth after use. 9/3/24   Mark Anthony Man DO         Code status and advanced directives:  Code Status: Level 1 - Full Code      VTE Pharmacologic Prophylaxis: Heparin  VTE Mechanical Prophylaxis: sequential compression device    ==  CHRIS Zavaleta  PGY-4, Neurology            [1]   Social History  Tobacco Use   Smoking Status Never   Smokeless Tobacco Never   [2]    Allergies  Allergen Reactions    Amoxicillin Hives    Penicillins Hives     ? Amoxicillian

## 2025-05-17 NOTE — PROGRESS NOTES
Progress Note - Colorectal   Name: Ramsey Rodgers 65 y.o. male I MRN: [id number]  [facility] I Date of Admission: 5/15/2025   Date of Service: 5/17/2025 I Hospital Day: 2    Assessment & Plan  Cancer of hepatic flexure (HCC)  S/p robotic right hemicolectomy (extended) on 5/15 for hepatic flexure adenocarcinoma.      - Continue to clear/toast/crackers  - maintenance IV fluids  - Accurate I's and O's  - multimodal pain regimen  - As needed antiemetics  - Encourage out of bed and ambulation starting   - Encourage incentive spirometer use  - Continue IV iron infusion of 300 mg daily through 5/18  - DVT prophylaxis with subcu heparin  - PT and OT consult for early postoperative ambulation and evaluation    Please contact the SecureChat role for the Colorectal service with any questions/concerns.    Subjective   Patient is ambulating.  Denies any nausea or emesis.  No bowel function.  Pain is well-controlled.    Objective :  Temp:  [98 °F (36.7 °C)-99.8 °F (37.7 °C)] 98.7 °F (37.1 °C)  HR:  [59-70] 67  BP: (117-122)/(66-68) 122/68  Resp:  [14-18] 14  SpO2:  [94 %-100 %] 96 %  O2 Device: None (Room air)    I/O         05/15 0701  05/16 0700 05/16 0701  05/17 0700    P.O. 1500 720    I.V. (mL/kg) 4335.4 (51.7) 1708 (20.4)    IV Piggyback 150     Total Intake(mL/kg) 5985.4 (71.3) 2428 (28.9)    Urine (mL/kg/hr) 4100 2400 (1.2)    Blood 50     Total Output 4150 2400    Net +1835.4 +28                  Physical Exam  Vitals reviewed.   HENT:      Head: Normocephalic and atraumatic.      Mouth/Throat:      Mouth: Mucous membranes are moist.     Eyes:      Pupils: Pupils are equal, round, and reactive to light.       Cardiovascular:      Rate and Rhythm: Normal rate and regular rhythm.      Pulses: Normal pulses.   Pulmonary:      Effort: Pulmonary effort is normal. No respiratory distress.   Abdominal:      General: Abdomen is flat. There is no distension (Mild).      Palpations: Abdomen is soft.      Tenderness:  There is abdominal tenderness (Appropriately tender).     Musculoskeletal:         General: No swelling or tenderness. Normal range of motion.      Cervical back: Normal range of motion.     Skin:     General: Skin is warm.     Neurological:      General: No focal deficit present.      Mental Status: He is alert and oriented to person, place, and time. Mental status is at baseline.           Lab Results: I have reviewed the following results:  Recent Labs     05/17/25  0509   WBC 11.03*   HGB 9.7*   HCT 33.3*      SODIUM 139   K 3.8      CO2 28   BUN 8   CREATININE 0.92   GLUC 100   MG 1.9       Imaging Results Review: No pertinent imaging studies reviewed.  Other Study Results Review: No additional pertinent studies reviewed.    VTE Pharmacologic Prophylaxis: VTE covered by:  heparin (porcine), Subcutaneous, 5,000 Units at 05/17/25 0602     VTE Mechanical Prophylaxis: sequential compression device

## 2025-05-17 NOTE — RAPID RESPONSE
Rapid Response Note  Ramsey Rodgers 65 y.o. male MRN: [id number]  [facility] Encounter: [id number]    Rapid Response Notification(s):   Response called date/time:  5/17/2025 9:26 AM  Response team arrival date/time:  5/17/2025 9:26 AM  Level of care:  [facility]  Rapid response location:  [facility] unit  Primary reason for rapid response call:  Other (comment)    Rapid Response Intervention(s):   Airway:  None  Breathing:  None  Circulation:  None  Fluids administered:  Lactated Ringer's  Medications administered:  None       Assessment:   Stroke like symptoms, mildly confused, transient left sided weakness.  Patient noted to lose consciousness during neuro exam and rapidly recovered  BP 88/56    Plan:   Stat Stroke Alert CTH/ CTA  MRI  H/H, BMP, prolactin, Mg and Phos  Fingerstick glucose  1L ivf     Rapid Response Outcome:   Transfer:  Remain on floor  Primary service notified of transfer: Yes    Code Status: Level 1 (Full Code)      Family notified: Yes, Name of Family member contacted Wife updated at bedside          Background/Situation:   Ramsey Rodgers is a 65 y.o. male who was approached by patient's wife in the hallway asking for assistance because her  collapsed. Patient s/p right hemicolectomy. Myself and Dr. Chavez went to assess the patient where he seemed mildly confused, and slow in responding to questions.  During my neuroexam the pupils were reactive, sluggish and equal at 4 mm. Patient responded appropriately to questions, knew year ( after clarification), president, and location. Right side remained 5 out of 5 upper and lower extremity strength, patient's left hand was unable to squeeze my fingers and the patient leaned over to his left and had an episode of clonus like movements 5 seconds.  At this time we activated the rapid.  Vital signs revealed patient to be hypotensive, manual blood pressure performed to verify this, BP 88/56.  Blood glucose 121.  Patient's heart rate was in the  "60s and SpO2 was greater than 96%. Patient had no further episodes and was transported to CT for imaging.    Review of Systems   Constitutional: Negative.    HENT: Negative.     Eyes: Negative.    Cardiovascular:  Positive for palpitations.   Gastrointestinal: Negative.    Endocrine: Negative.    Genitourinary: Negative.    Musculoskeletal: Negative.    Skin: Negative.    Allergic/Immunologic: Negative.    Neurological: Negative.    Hematological: Negative.    Psychiatric/Behavioral: Negative.         Objective:   Vitals:    05/17/25 0933 05/17/25 0934 05/17/25 0936 05/17/25 0937   BP:  97/53 97/53 104/64   Pulse: 75  69    Resp:       Temp:       TempSrc:       SpO2: 93%  93%    Weight:       Height:         Physical Exam  Constitutional:       General: He is not in acute distress.     Appearance: He is not ill-appearing, toxic-appearing or diaphoretic.   HENT:      Head: Normocephalic and atraumatic.     Eyes:      Conjunctiva/sclera: Conjunctivae normal.      Pupils: Pupils are equal, round, and reactive to light.       Cardiovascular:      Rate and Rhythm: Normal rate and regular rhythm.      Pulses: Normal pulses.      Heart sounds: Normal heart sounds.   Pulmonary:      Effort: Pulmonary effort is normal.      Breath sounds: Normal breath sounds.   Abdominal:      General: There is no distension.      Palpations: Abdomen is soft. There is no mass.      Tenderness: There is no abdominal tenderness. There is no guarding or rebound.      Hernia: No hernia is present.     Musculoskeletal:      Right lower leg: No edema.      Left lower leg: No edema.     Skin:     General: Skin is warm and dry.      Capillary Refill: Capillary refill takes less than 2 seconds.      Coloration: Skin is jaundiced.     Neurological:      Mental Status: He is alert.      Comments: Transient weakness of left side.       Portions of the record may have been created with voice recognition software.  Occasional wrong word or \"sound a " "like\" substitutions may have occurred due to the inherent limitations of voice recognition software.  Read the chart carefully and recognize, using context, where substitutions have occurred.  ==  oYvanny Renee MD  [facility]  Internal Medicine Residency PGY-1      "

## 2025-05-17 NOTE — PLAN OF CARE
Problem: PAIN - ADULT  Goal: Verbalizes/displays adequate comfort level or baseline comfort level  Description: Interventions:  - Encourage patient to monitor pain and request assistance  - Assess pain using appropriate pain scale  - Administer analgesics as ordered based on type and severity of pain and evaluate response  - Implement non-pharmacological measures as appropriate and evaluate response  - Consider cultural and social influences on pain and pain management  - Notify physician/advanced practitioner if interventions unsuccessful or patient reports new pain  - Educate patient/family on pain management process including their role and importance of  reporting pain   - Provide non-pharmacologic/complimentary pain relief interventions  Outcome: Progressing     Problem: INFECTION - ADULT  Goal: Absence or prevention of progression during hospitalization  Description: INTERVENTIONS:  - Assess and monitor for signs and symptoms of infection  - Monitor lab/diagnostic results  - Monitor all insertion sites, i.e. indwelling lines, tubes, and drains  - Monitor endotracheal if appropriate and nasal secretions for changes in amount and color  - [place] appropriate cooling/warming therapies per order  - Administer medications as ordered  - Instruct and encourage patient and family to use good hand hygiene technique  - Identify and instruct in appropriate isolation precautions for identified infection/condition  Outcome: Progressing  Goal: Absence of fever/infection during neutropenic period  Description: INTERVENTIONS:  - Monitor WBC  - Perform strict hand hygiene  - Limit to healthy visitors only  - No plants, dried, fresh or silk flowers with kellogg in patient room  Outcome: Progressing     Problem: SAFETY ADULT  Goal: Patient will remain free of falls  Description: INTERVENTIONS:  - Educate patient/family on patient safety including physical limitations  - Instruct patient to call for assistance with activity   -  Consider consulting OT/PT to assist with strengthening/mobility based on AM PAC & JH-HLM score  - Consult OT/PT to assist with strengthening/mobility   - Keep Call bell within reach  - Keep bed low and locked with side rails adjusted as appropriate  - Keep care items and personal belongings within reach  - Initiate and maintain comfort rounds  - Make Fall Risk Sign visible to staff  - Offer Toileting every 2 Hours, in advance of need  - Initiate/Maintain bed alarm  - Obtain necessary fall risk management equipment  - Apply yellow socks and bracelet for high fall risk patients  - Consider moving patient to room near nurses station  Outcome: Progressing  Goal: Maintain or return to baseline ADL function  Description: INTERVENTIONS:  -  Assess patient's ability to carry out ADLs; assess patient's baseline for ADL function and identify physical deficits which impact ability to perform ADLs (bathing, care of mouth/teeth, toileting, grooming, dressing, etc.)  - Assess/evaluate cause of self-care deficits   - Assess range of motion  - Assess patient's mobility; develop plan if impaired  - Assess patient's need for assistive devices and provide as appropriate  - Encourage maximum independence but intervene and supervise when necessary  - Involve family in performance of ADLs  - Assess for home care needs following discharge   - Consider OT consult to assist with ADL evaluation and planning for discharge  - Provide patient education as appropriate  - Monitor functional capacity and physical performance, use of AM PAC & JH-HLM   - Monitor gait, balance and fatigue with ambulation    Outcome: Progressing  Goal: Maintains/Returns to pre admission functional level  Description: INTERVENTIONS:  - Perform AM-PAC 6 Click Basic Mobility/ Daily Activity assessment daily.  - Set and communicate daily mobility goal to care team and patient/family/caregiver.   - Collaborate with rehabilitation services on mobility goals if consulted  -  Ambulate patient 3 times a day  - Out of bed to chair 3 times a day   - Out of bed for meals 3 times a day  - Out of bed for toileting  - Record patient progress and toleration of activity level   Outcome: Progressing     Problem: DISCHARGE PLANNING  Goal: Discharge to home or other facility with appropriate resources  Description: INTERVENTIONS:  - Identify barriers to discharge w/patient and caregiver  - Arrange for needed discharge resources and transportation as appropriate  - Identify discharge learning needs (meds, wound care, etc.)  - Arrange for interpretive services to assist at discharge as needed  - Refer to Case Management Department for coordinating discharge planning if the patient needs post-hospital services based on physician/advanced practitioner order or complex needs related to functional status, cognitive ability, or social support system  Outcome: Progressing     Problem: Knowledge Deficit  Goal: Patient/family/caregiver demonstrates understanding of disease process, treatment plan, medications, and discharge instructions  Description: Complete learning assessment and assess knowledge base.  Interventions:  - Provide teaching at level of understanding  - Provide teaching via preferred learning methods  Outcome: Progressing

## 2025-05-17 NOTE — CONSULTS
Duplicate consult. Please see original consult note written by Dr. Richardson on 5/17/25.    Inpatient consult to Neurology  Consult performed by: Mykel English DO  Consult ordered by: Shay Azevedo MD

## 2025-05-17 NOTE — ASSESSMENT & PLAN NOTE
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      - Continue to clear/toast/crackers  - maintenance IV fluids  - Accurate I's and O's  - multimodal pain regimen  - As needed antiemetics  - Encourage out of bed and ambulation starting   - Encourage incentive spirometer use  - Continue IV iron infusion of 300 mg daily through 5/18  - DVT prophylaxis with subcu heparin  - PT and OT consult for early postoperative ambulation and evaluation

## 2025-05-18 LAB
ANION GAP SERPL CALCULATED.3IONS-SCNC: 6 MMOL/L (ref 4–13)
BASOPHILS # BLD AUTO: 0.04 THOUSANDS/ÂΜL (ref 0–0.1)
BASOPHILS NFR BLD AUTO: 1 % (ref 0–1)
BUN SERPL-MCNC: 5 MG/DL (ref 5–25)
CALCIUM SERPL-MCNC: 8 MG/DL (ref 8.4–10.2)
CHLORIDE SERPL-SCNC: 108 MMOL/L (ref 96–108)
CO2 SERPL-SCNC: 25 MMOL/L (ref 21–32)
CREAT SERPL-MCNC: 0.81 MG/DL (ref 0.6–1.3)
EOSINOPHIL # BLD AUTO: 0.03 THOUSAND/ÂΜL (ref 0–0.61)
EOSINOPHIL NFR BLD AUTO: 1 % (ref 0–6)
ERYTHROCYTE [DISTWIDTH] IN BLOOD BY AUTOMATED COUNT: 27.7 % (ref 11.6–15.1)
GFR SERPL CREATININE-BSD FRML MDRD: 94 ML/MIN/1.73SQ M
GLUCOSE SERPL-MCNC: 91 MG/DL (ref 65–140)
HCT VFR BLD AUTO: 28.7 % (ref 36.5–49.3)
HGB BLD-MCNC: 8.3 G/DL (ref 12–17)
IMM GRANULOCYTES # BLD AUTO: 0.02 THOUSAND/UL (ref 0–0.2)
IMM GRANULOCYTES NFR BLD AUTO: 0 % (ref 0–2)
LYMPHOCYTES # BLD AUTO: 0.8 THOUSANDS/ÂΜL (ref 0.6–4.47)
LYMPHOCYTES NFR BLD AUTO: 14 % (ref 14–44)
MAGNESIUM SERPL-MCNC: 1.9 MG/DL (ref 1.9–2.7)
MCH RBC QN AUTO: 23.1 PG (ref 26.8–34.3)
MCHC RBC AUTO-ENTMCNC: 28.9 G/DL (ref 31.4–37.4)
MCV RBC AUTO: 80 FL (ref 82–98)
MONOCYTES # BLD AUTO: 0.56 THOUSAND/ÂΜL (ref 0.17–1.22)
MONOCYTES NFR BLD AUTO: 10 % (ref 4–12)
NEUTROPHILS # BLD AUTO: 4.45 THOUSANDS/ÂΜL (ref 1.85–7.62)
NEUTS SEG NFR BLD AUTO: 74 % (ref 43–75)
NRBC BLD AUTO-RTO: 0 /100 WBCS
PHOSPHATE SERPL-MCNC: 3.8 MG/DL (ref 2.3–4.1)
PLATELET # BLD AUTO: 248 THOUSANDS/UL (ref 149–390)
PMV BLD AUTO: 10.7 FL (ref 8.9–12.7)
POTASSIUM SERPL-SCNC: 3.5 MMOL/L (ref 3.5–5.3)
RBC # BLD AUTO: 3.59 MILLION/UL (ref 3.88–5.62)
SODIUM SERPL-SCNC: 139 MMOL/L (ref 135–147)
WBC # BLD AUTO: 5.9 THOUSAND/UL (ref 4.31–10.16)

## 2025-05-18 PROCEDURE — 99024 POSTOP FOLLOW-UP VISIT: CPT | Performed by: COLON & RECTAL SURGERY

## 2025-05-18 PROCEDURE — 99233 SBSQ HOSP IP/OBS HIGH 50: CPT | Performed by: STUDENT IN AN ORGANIZED HEALTH CARE EDUCATION/TRAINING PROGRAM

## 2025-05-18 PROCEDURE — 84100 ASSAY OF PHOSPHORUS: CPT | Performed by: STUDENT IN AN ORGANIZED HEALTH CARE EDUCATION/TRAINING PROGRAM

## 2025-05-18 PROCEDURE — 83735 ASSAY OF MAGNESIUM: CPT | Performed by: STUDENT IN AN ORGANIZED HEALTH CARE EDUCATION/TRAINING PROGRAM

## 2025-05-18 PROCEDURE — 80048 BASIC METABOLIC PNL TOTAL CA: CPT | Performed by: STUDENT IN AN ORGANIZED HEALTH CARE EDUCATION/TRAINING PROGRAM

## 2025-05-18 PROCEDURE — 99222 1ST HOSP IP/OBS MODERATE 55: CPT | Performed by: PHYSICIAN ASSISTANT

## 2025-05-18 PROCEDURE — 85025 COMPLETE CBC W/AUTO DIFF WBC: CPT | Performed by: STUDENT IN AN ORGANIZED HEALTH CARE EDUCATION/TRAINING PROGRAM

## 2025-05-18 RX ORDER — POTASSIUM CHLORIDE 1500 MG/1
40 TABLET, EXTENDED RELEASE ORAL ONCE
Status: COMPLETED | OUTPATIENT
Start: 2025-05-18 | End: 2025-05-18

## 2025-05-18 RX ADMIN — GABAPENTIN 100 MG: 100 CAPSULE ORAL at 00:33

## 2025-05-18 RX ADMIN — POTASSIUM CHLORIDE 40 MEQ: 1500 TABLET, EXTENDED RELEASE ORAL at 08:54

## 2025-05-18 RX ADMIN — ACETAMINOPHEN 975 MG: 325 TABLET ORAL at 06:31

## 2025-05-18 RX ADMIN — METHOCARBAMOL 500 MG: 500 TABLET ORAL at 12:44

## 2025-05-18 RX ADMIN — METHOCARBAMOL 500 MG: 500 TABLET ORAL at 19:27

## 2025-05-18 RX ADMIN — GABAPENTIN 100 MG: 100 CAPSULE ORAL at 19:27

## 2025-05-18 RX ADMIN — METHOCARBAMOL 500 MG: 500 TABLET ORAL at 00:33

## 2025-05-18 RX ADMIN — GABAPENTIN 100 MG: 100 CAPSULE ORAL at 08:54

## 2025-05-18 RX ADMIN — METHOCARBAMOL 500 MG: 500 TABLET ORAL at 06:31

## 2025-05-18 RX ADMIN — ACETAMINOPHEN 975 MG: 325 TABLET ORAL at 21:35

## 2025-05-18 RX ADMIN — ACETAMINOPHEN 975 MG: 325 TABLET ORAL at 14:15

## 2025-05-18 NOTE — PROGRESS NOTES
Progress Note - Colorectal   Name: Ramsey Rodgers 65 y.o. 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      - Advance to low residue diet  - Accurate I's and O's  - multimodal pain regimen  - As needed antiemetics  - Encourage out of bed and ambulation starting   - Encourage incentive spirometer use  - Hold IV iron infusion   - DVT prophylaxis with subcu heparin  - PT and OT consult for early postoperative ambulation and evaluation  -Dispo pending neurologic evaluation  R thalamocapsular IPH  Patient had a rapid response stroke alert called on 5/17.  This occurred after patient became weak, and transiently flaccid in his upper extremities, with near LOC.  CT scan showed subcentimeter acute parenchymal hemorrhage in the right thalamus, with stable follow-up CT.    - MRI pending  - Follow-up neurosurgery  - Follow-up neurology      Please contact the SecureChat role for the Colorectal service with any questions/concerns.    Subjective   Patient feels good.  Denies any further neurosymptoms.    Objective :  Temp:  [97.9 °F (36.6 °C)-99.3 °F (37.4 °C)] 98.4 °F (36.9 °C)  HR:  [64-93] 67  BP: ()/(50-85) 128/70  Resp:  [14-18] 14  SpO2:  [93 %-98 %] 97 %  O2 Device: None (Room air)    I/O         05/16 0701  05/17 0700 05/17 0701  05/18 0700 05/18 0701  05/19 0700    P.O. 720 720     I.V. (mL/kg) 1708 (20.4) 112 (1.3)     IV Piggyback       Total Intake(mL/kg) 2428 (28.9) 832 (9.9)     Urine (mL/kg/hr) 2400 (1.2) 450 (0.2)     Blood       Total Output 2400 450     Net +28 +382            Unmeasured Urine Occurrence  7 x             Physical Exam  Vitals reviewed.   HENT:      Head: Normocephalic and atraumatic.      Mouth/Throat:      Mouth: Mucous membranes are moist.     Eyes:      Pupils: Pupils  are equal, round, and reactive to light.       Cardiovascular:      Rate and Rhythm: Normal rate and regular rhythm.      Pulses: Normal pulses.   Pulmonary:      Effort: Pulmonary effort is normal. No respiratory distress.   Abdominal:      General: Abdomen is flat. There is no distension.      Palpations: Abdomen is soft.      Tenderness: There is abdominal tenderness (Appropriately tender).     Musculoskeletal:         General: No swelling or tenderness. Normal range of motion.      Cervical back: Normal range of motion.     Skin:     General: Skin is warm.     Neurological:      General: No focal deficit present.      Mental Status: He is alert and oriented to person, place, and time. Mental status is at baseline.      Cranial Nerves: No cranial nerve deficit.      Sensory: No sensory deficit.      Motor: No weakness.           Lab Results: I have reviewed the following results:  Recent Labs     05/18/25  0504   WBC 5.90   HGB 8.3*   HCT 28.7*      SODIUM 139   K 3.5      CO2 25   BUN 5   CREATININE 0.81   GLUC 91   MG 1.9   PHOS 3.8       Imaging Results Review: I reviewed radiology reports from this admission including: CT head and MRI brain.  MRI brain w wo contrast  Result Date: 5/18/2025  Impression: 1. Stable blood products in the right thalamus which correspond to the findings on head CT. Given the hyperdensity on head CT, acute blood products are favored. Recommend head CT follow-up to resolution. There are no imaging findings suspicious for underlying mass or cavernous malformation. 2. Otherwise unremarkable exam. No enhancing lesion seen. Workstation performed: [id number]     CT head wo contrast  Result Date: 5/17/2025  Impression: Stable subcentimeter parenchymal hematoma in the right thalamus. Workstation performed: [id number]     CTA stroke alert (head/neck)  Result Date: 5/17/2025  Impression: Unremarkable CTA neck and brain. Findings were directly discussed with Joe Hogan at 10:10  a.m. Workstation performed: [id number]     CT stroke alert brain  Result Date: 5/17/2025  Impression: Subcentimeter acute parenchymal hemorrhage within the right thalamus see series 2 image 20. Findings were directly discussed with Joe Hogan at approximately 10:10 a.m. Workstation performed: [id number]       Other Study Results Review: No additional pertinent studies reviewed.    VTE Pharmacologic Prophylaxis: VTE covered by:  [Held by provider] heparin (porcine), Subcutaneous, 5,000 Units at 05/17/25 0602     VTE Mechanical Prophylaxis: sequential compression device

## 2025-05-18 NOTE — ASSESSMENT & PLAN NOTE
Status post rapid response/stroke alert called on May 17 -patient developed weakness with transient flaccid upper extremities with near loss of consciousness.    Imaging:   CTA stroke alert 5/17: Reported unremarkable CTA.  No stenosis, occlusion.   CT head stroke alert 5/17: Subcentimeter acute parenchymal hemorrhage in the right thalamus.   Repeat CT head wo 5/17: Stable subcentimeter parenchymal hematoma in the right thalamus.  MRI brain with without 5/17: Stable proximal in the right thalamus corresponding finding on CT.  Given the hyperdensity on CT acute blood products favored.  There are no imaging findings suspicious for underlying mass or cavernous malformation.  No enhancing lesions.    Plan:   Continue to monitor neurological exam  Stat CT head with any neurological decline including drop in GCS of 2 points within 1 hour.  CT and MRI imaging reviewed.  No neurosurgical intervention indicated.  Ongoing medical management per primary and neurology.  Mobilize with physical and occupational Therapy.   DVT prophylaxis: Bilateral SCDs.  Can consider pharmacological DVT prophylaxis from neurosurgical standpoint given stable CT head.    Neurosurgery will sign off.  Anticipate outpatient follow-up with neurology. Call for questions or concerns.

## 2025-05-18 NOTE — PLAN OF CARE
Problem: PAIN - ADULT  Goal: Verbalizes/displays adequate comfort level or baseline comfort level  Description: Interventions:  - Encourage patient to monitor pain and request assistance  - Assess pain using appropriate pain scale  - Administer analgesics as ordered based on type and severity of pain and evaluate response  - Implement non-pharmacological measures as appropriate and evaluate response  - Consider cultural and social influences on pain and pain management  - Notify physician/advanced practitioner if interventions unsuccessful or patient reports new pain  - Educate patient/family on pain management process including their role and importance of  reporting pain   - Provide non-pharmacologic/complimentary pain relief interventions  Outcome: Progressing     Problem: INFECTION - ADULT  Goal: Absence or prevention of progression during hospitalization  Description: INTERVENTIONS:  - Assess and monitor for signs and symptoms of infection  - Monitor lab/diagnostic results  - Monitor all insertion sites, i.e. indwelling lines, tubes, and drains  - Monitor endotracheal if appropriate and nasal secretions for changes in amount and color  - [place] appropriate cooling/warming therapies per order  - Administer medications as ordered  - Instruct and encourage patient and family to use good hand hygiene technique  - Identify and instruct in appropriate isolation precautions for identified infection/condition  Outcome: Progressing  Goal: Absence of fever/infection during neutropenic period  Description: INTERVENTIONS:  - Monitor WBC  - Perform strict hand hygiene  - Limit to healthy visitors only  - No plants, dried, fresh or silk flowers with kellogg in patient room  Outcome: Progressing     Problem: SAFETY ADULT  Goal: Patient will remain free of falls  Description: INTERVENTIONS:  - Educate patient/family on patient safety including physical limitations  - Instruct patient to call for assistance with activity   -  Consider consulting OT/PT to assist with strengthening/mobility based on AM PAC & JH-HLM score  - Consult OT/PT to assist with strengthening/mobility   - Keep Call bell within reach  - Keep bed low and locked with side rails adjusted as appropriate  - Keep care items and personal belongings within reach  - Initiate and maintain comfort rounds  - Make Fall Risk Sign visible to staff  - Offer Toileting every  Hours, in advance of need  - Initiate/Maintain alarm  - Obtain necessary fall risk management equipment:   - Apply yellow socks and bracelet for high fall risk patients  - Consider moving patient to room near nurses station  Outcome: Progressing  Goal: Maintain or return to baseline ADL function  Description: INTERVENTIONS:  -  Assess patient's ability to carry out ADLs; assess patient's baseline for ADL function and identify physical deficits which impact ability to perform ADLs (bathing, care of mouth/teeth, toileting, grooming, dressing, etc.)  - Assess/evaluate cause of self-care deficits   - Assess range of motion  - Assess patient's mobility; develop plan if impaired  - Assess patient's need for assistive devices and provide as appropriate  - Encourage maximum independence but intervene and supervise when necessary  - Involve family in performance of ADLs  - Assess for home care needs following discharge   - Consider OT consult to assist with ADL evaluation and planning for discharge  - Provide patient education as appropriate  - Monitor functional capacity and physical performance, use of AM PAC & JH-HLM   - Monitor gait, balance and fatigue with ambulation    Outcome: Progressing  Goal: Maintains/Returns to pre admission functional level  Description: INTERVENTIONS:  - Perform AM-PAC 6 Click Basic Mobility/ Daily Activity assessment daily.  - Set and communicate daily mobility goal to care team and patient/family/caregiver.   - Collaborate with rehabilitation services on mobility goals if consulted  -  Perform Range of Motion  times a day.  - Reposition patient every  hours.  - Dangle patient  times a day  - Stand patient  times a day  - Ambulate patient  times a day  - Out of bed to chair  times a day   - Out of bed for meals times a day  - Out of bed for toileting  - Record patient progress and toleration of activity level   Outcome: Progressing     Problem: DISCHARGE PLANNING  Goal: Discharge to home or other facility with appropriate resources  Description: INTERVENTIONS:  - Identify barriers to discharge w/patient and caregiver  - Arrange for needed discharge resources and transportation as appropriate  - Identify discharge learning needs (meds, wound care, etc.)  - Arrange for interpretive services to assist at discharge as needed  - Refer to Case Management Department for coordinating discharge planning if the patient needs post-hospital services based on physician/advanced practitioner order or complex needs related to functional status, cognitive ability, or social support system  Outcome: Progressing     Problem: Knowledge Deficit  Goal: Patient/family/caregiver demonstrates understanding of disease process, treatment plan, medications, and discharge instructions  Description: Complete learning assessment and assess knowledge base.  Interventions:  - Provide teaching at level of understanding  - Provide teaching via preferred learning methods  Outcome: Progressing

## 2025-05-18 NOTE — CONSULTS
Consultation - Neurosurgery   Name: Ramsey Rodgers 65 y.o. male I MRN: [id number]  [facility] I Date of Admission: 5/15/2025   Date of Service: 5/18/2025 I Hospital Day: 3   Inpatient consult to Neurosurgery  Consult performed by: Joelle Garcia PA-C  Consult ordered by: Shay Azevedo MD        Physician Requesting Evaluation: Brock Alicea MD   Reason for Evaluation / Principal Problem: ICH    Assessment & Plan  R thalamocapsular IPH  Status post rapid response/stroke alert called on May 17 -patient developed weakness with transient flaccid upper extremities with near loss of consciousness.    Imaging:   CTA stroke alert 5/17: Reported unremarkable CTA.  No stenosis, occlusion.   CT head stroke alert 5/17: Subcentimeter acute parenchymal hemorrhage in the right thalamus.   Repeat CT head wo 5/17: Stable subcentimeter parenchymal hematoma in the right thalamus.  MRI brain with without 5/17: Stable proximal in the right thalamus corresponding finding on CT.  Given the hyperdensity on CT acute blood products favored.  There are no imaging findings suspicious for underlying mass or cavernous malformation.  No enhancing lesions.    Plan:   Continue to monitor neurological exam  Stat CT head with any neurological decline including drop in GCS of 2 points within 1 hour.  CT and MRI imaging reviewed.  No neurosurgical intervention indicated.  Ongoing medical management per primary and neurology.  Mobilize with physical and occupational Therapy.   DVT prophylaxis: Bilateral SCDs.  Can consider pharmacological DVT prophylaxis from neurosurgical standpoint given stable CT head.    Neurosurgery will sign off.  Anticipate outpatient follow-up with neurology. Call for questions or concerns.    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  Stroke alert was called.  Symptoms resolved within 2 minutes and patient returned to baseline.  Vitals during episode included blood pressure 75/50, pulse 58. NIHSS reported 0.  Episode further described by patient and his wife.  He was sitting in the chair receiving an iron transfusion when he started to feel lightheaded he leaned forward on his elbows and then sat back.  She noted significant left arm shaking then right shoulder greater than left shoulder shaking which lasted approximately 15 seconds he then slowly slumped forward into the side staring off with eyes open.  She went to get help.  Patient was a stroke alert rapid response.  He returned to baseline after approximately 2 minutes.  No recurrence of symptoms.  CT imaging demonstrated small right thalamic hemorrhage.  Follow-up MRI confirmed no underlying enhancement.    Review of Systems   Constitutional:  Negative for activity change, fatigue and fever.   HENT:  Negative for mouth sores, trouble swallowing and voice change.    Eyes:  Negative for photophobia and visual disturbance.   Respiratory:  Negative for cough and shortness of breath.    Cardiovascular:  Negative for chest pain and leg swelling.   Gastrointestinal:  Negative for abdominal pain, nausea and vomiting.   Genitourinary:  Negative for decreased urine volume and difficulty urinating.   Musculoskeletal:  Negative for back pain, gait problem and neck pain.   Skin:  Negative for pallor, rash and wound.   Neurological:  Positive for dizziness (Resolved) and weakness (Resolved). Negative for tremors, seizures, speech difficulty, light-headedness, numbness and headaches.   Psychiatric/Behavioral:  Negative for agitation and confusion.       Historical Information   Past Medical History:   Diagnosis Date    Allergic rhinitis     Anemia     iron deficiency    Bronchospasm with bronchitis, acute 07/02/2018    Community acquired pneumonia of left lower lobe of lung 02/12/2018    Impacted cerumen of left ear 01/20/2020    Influenza 01/31/2018    Snoring     wears mouthpiece     Past Surgical History:   Procedure Laterality Date    COLONOSCOPY      DENTAL SURGERY      EGD      HERNIA REPAIR      childhood    CA LAPAROSCOPY COLECTOMY PARTIAL W/ANASTOMOSIS N/A 5/15/2025    Procedure: RESECTION COLON RIGHT LAPAROSCOPIC W/ ROBOTICS;  Surgeon: Brock Alicea MD;  Location: [facility];  Service: Colorectal    SKIN LESION EXCISION      benign     Social History     Tobacco Use    Smoking status: Never    Smokeless tobacco: Never   Vaping Use    Vaping status: Never Used   Substance and Sexual Activity    Alcohol use: Never    Drug use: Never    Sexual activity: Never     Partners: Female     E-Cigarette/Vaping    E-Cigarette Use Never User      E-Cigarette/Vaping Substances    Nicotine No     THC No     CBD No     Flavoring No     Other No     Unknown No      Family History   Problem Relation Age of Onset    Diabetes type I Mother     Prostate cancer Father     Coronary artery disease Maternal Grandfather     Lung cancer Paternal Grandmother     Cancer Paternal Grandfather     Colon cancer Neg Hx     Testicular cancer Neg Hx        Objective :  Temp:  [98.1 °F (36.7 °C)-98.6 °F (37 °C)] 98.1 °F (36.7 °C)  HR:  [67-84] 67  BP: (128-150)/(70-93) 136/75  Resp:  [14-16] 16  SpO2:  [96 %-98 %] 98 %  O2 Device: None (Room air)    Physical Exam  Constitutional:       General: He is not in acute distress.     Appearance: Normal appearance. He is well-developed. He is not ill-appearing.   HENT:      Head: Normocephalic and atraumatic.      Right Ear: External ear normal.      Left Ear: External ear normal.      Nose: Nose normal.      Mouth/Throat:      Mouth:  Mucous membranes are moist.     Eyes:      General: No scleral icterus.        Right eye: No discharge.         Left eye: No discharge.      Extraocular Movements: Extraocular movements intact.      Conjunctiva/sclera: Conjunctivae normal.       Cardiovascular:      Rate and Rhythm: Normal rate.   Pulmonary:      Effort: Pulmonary effort is normal. No respiratory distress.     Musculoskeletal:      Cervical back: Normal range of motion and neck supple.     Skin:     General: Skin is warm and dry.     Neurological:      Mental Status: He is alert.      Motor: Motor strength is normal.    Psychiatric:         Mood and Affect: Mood normal.         Speech: Speech normal.         Behavior: Behavior normal.         Thought Content: Thought content normal.         Judgment: Judgment normal.      Neurological Exam  Mental Status  Alert. Speech is normal. Language is fluent with no aphasia. Attention and concentration are normal.    Cranial Nerves  CN III, IV, VI: Extraocular movements intact bilaterally.  CN V: Facial sensation is normal.  CN VII: Full and symmetric facial movement.  CN VIII: Hearing is normal.  CN XI: Shoulder shrug strength is normal.  CN XII: Tongue midline without atrophy or fasciculations.    Motor  Normal muscle bulk throughout. Normal muscle tone. Strength is 5/5 throughout all four extremities.    Sensory  Light touch is normal in upper and lower extremities.     Coordination  Right: Finger-to-nose normal.Left: Finger-to-nose normal.        Lab Results: I have reviewed the following results:  Recent Labs     05/18/25  0504   WBC 5.90   HGB 8.3*   HCT 28.7*      SODIUM 139   K 3.5      CO2 25   BUN 5   CREATININE 0.81   GLUC 91   MG 1.9   PHOS 3.8       Imaging Results Review: I personally reviewed the following image studies in PACS and associated radiology reports: CT head and MRI brain. My interpretation of the radiology images/reports is: As above.      VTE Pharmacologic Prophylaxis:  Sequential compression device (Venodyne)

## 2025-05-18 NOTE — PROGRESS NOTES
Progress Note - Neurology   Name: Ramsey Rodgers 65 y.o. male I MRN: [id number]  [facility] Date of Admission: 5/15/2025   Date of Service: 5/18/2025 I Hospital Day: 3    Assessment & Plan  R thalamocapsular IPH  65 y.o. 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 LKW 9:00AM. Initial presenting deficits: transient word finding difficulty, generalized weakness-unable to stand up. Sxs lasted for 2 mins and resolved by the time of neuro evaluation.     Workup:  NIHSS-0.   BP: 75/50.   NCCTH: R thalamocapsular bleed, ICH 0.   Not a TNK Candidate due to IPH.   CTA h/n: unremarkable.  Neuro exam w/out focal deficits.  MRIB w w/o: stable R thalamic blood    Impression:   Episode of transient word finding difficulty and generalized weakness in setting of low BP likely due to hypoperfusion; the NCCTH, however, reveals a R thalamocapsular IPH which is unlikely to be related to pt sxs for which neuro was consulted. While this is a typical location for hypertensive bleeds, pt has not had elevated BP readings, instead hypotensive episode which likely triggered the stroke alert. No evidence of metastatic dx in the brain nor of obvious underlying infarction or vascular malformation. Expect source of small bleed to be better determined by o/p MRIB in several wks.    Plan:  - Discussed w/ attending physician Dr. Joe Hogan  - Obtain MRI brain w/ w/o repeat in 3-4 wks as ordered  - F/u in 6 wks after completing the MRIB  - From neuro perspective should remain off AP/AC  - AP/AC can be considered per NSGY or o/p neuro  - NSGY aware, no intervention indicated  - -140, MAP>65, avoid hypotension  - Stat CTA h/n if new neuro deficits or worsening neuro exam  - PT/OT/PMR/ST evals per primary team  - Hold medical DVT ppx for now, SCDs only  - Glycemic control, goal 140-180    Ramsey Rodgers will need  follow up in in 6 weeks with neurovascular attending. He will require a repeat MRI within 3-4 weeks. Msg sent.  Neurology service signing off.  Please contact the SecureChat role for the Neurology service with any questions/concerns.    Subjective   Pt notes no repeat episode of transient stroke like sxs similar to day prior, NAD, NAEO, no new complaints, overall in great spirits & expresses interest neuro discussion.    Review of Systems   Neurological:  Negative for dizziness, tremors, seizures, syncope, facial asymmetry, speech difficulty, weakness, light-headedness, numbness and headaches.       Objective :  Temp:  [97.9 °F (36.6 °C)-98.6 °F (37 °C)] 98.5 °F (36.9 °C)  HR:  [64-93] 81  BP: ()/(50-85) 145/78  Resp:  [14-18] 14  SpO2:  [93 %-98 %] 96 %  O2 Device: None (Room air)    Physical Exam  Vitals reviewed.   Constitutional:       General: He is awake. He is not in acute distress.     Appearance: He is not ill-appearing, toxic-appearing or diaphoretic.   HENT:      Head: Normocephalic and atraumatic.      Right Ear: External ear normal.      Left Ear: External ear normal.      Nose: Nose normal.      Mouth/Throat:      Pharynx: Oropharynx is clear.     Eyes:      General: Lids are normal. No scleral icterus.        Right eye: No discharge.         Left eye: No discharge.      Extraocular Movements: Extraocular movements intact.      Conjunctiva/sclera: Conjunctivae normal.      Pupils: Pupils are equal, round, and reactive to light.       Neurological:      Mental Status: He is alert.      Cranial Nerves: No cranial nerve deficit or dysarthria.      Coordination: Coordination normal.      Gait: Gait normal.      Deep Tendon Reflexes: Reflexes normal.     Neurological Exam  Mental Status  Awake and alert. Oriented to person, place, time and situation. no dysarthria present. Language is fluent with no aphasia.    Cranial Nerves  CN I: Sense of smell is normal.  CN II: Visual acuity is normal. Visual  fields full to confrontation.  CN III, IV, VI: Extraocular movements intact bilaterally. Normal lids and orbits bilaterally. Pupils equal round and reactive to light bilaterally.  CN V: Facial sensation is normal.  CN VII: Full and symmetric facial movement.  CN VIII: Hearing is normal.  CN IX, X: Palate elevates symmetrically. Normal gag reflex.  CN XI: Shoulder shrug strength is normal.  CN XII: Tongue midline without atrophy or fasciculations.    Motor  Normal muscle bulk throughout. No fasciculations present. Normal muscle tone. No abnormal involuntary movements. Strength is 5/5 in all four extremities except as noted.    Sensory  Light touch is normal in upper and lower extremities.     Reflexes  Deep tendon reflexes are 2+ and symmetric except as noted.    Coordination  Right: Finger-to-nose normal.Left: Finger-to-nose normal.    Gait   Normal gait.Casual gait is normal including stance, stride, and arm swing.        Lab Results: I have reviewed the following results:  Imaging Results Review: I personally reviewed the following image studies in PACS and associated radiology reports: MRI brain. My interpretation of the radiology images/reports is: as noted above.  Other Study Results Review: EKG was reviewed.     VTE Pharmacologic Prophylaxis: VTE covered by:    None       Administrative Statements   I have spent a total time of 50 minutes in caring for this patient on the day of the visit/encounter including Diagnostic results, Risks and benefits of tx options, Patient and family education, Impressions, Counseling / Coordination of care, Documenting in the medical record, Reviewing/placing orders in the medical record (including tests, medications, and/or procedures), Obtaining or reviewing history  , and Communicating with other healthcare professionals .

## 2025-05-18 NOTE — ASSESSMENT & PLAN NOTE
Patient had a rapid response stroke alert called on 5/17.  This occurred after patient became weak, and transiently flaccid in his upper extremities, with near LOC.  CT scan showed subcentimeter acute parenchymal hemorrhage in the right thalamus, with stable follow-up CT.    - MRI pending  - Follow-up neurosurgery  - Follow-up neurology

## 2025-05-18 NOTE — PLAN OF CARE
Problem: PAIN - ADULT  Goal: Verbalizes/displays adequate comfort level or baseline comfort level  Description: Interventions:  - Encourage patient to monitor pain and request assistance  - Assess pain using appropriate pain scale  - Administer analgesics as ordered based on type and severity of pain and evaluate response  - Implement non-pharmacological measures as appropriate and evaluate response  - Consider cultural and social influences on pain and pain management  - Notify physician/advanced practitioner if interventions unsuccessful or patient reports new pain  - Educate patient/family on pain management process including their role and importance of  reporting pain   - Provide non-pharmacologic/complimentary pain relief interventions  Outcome: Progressing     Problem: INFECTION - ADULT  Goal: Absence or prevention of progression during hospitalization  Description: INTERVENTIONS:  - Assess and monitor for signs and symptoms of infection  - Monitor lab/diagnostic results  - Monitor all insertion sites, i.e. indwelling lines, tubes, and drains  - Monitor endotracheal if appropriate and nasal secretions for changes in amount and color  - [place] appropriate cooling/warming therapies per order  - Administer medications as ordered  - Instruct and encourage patient and family to use good hand hygiene technique  - Identify and instruct in appropriate isolation precautions for identified infection/condition  Outcome: Progressing     Problem: SAFETY ADULT  Goal: Patient will remain free of falls  Description: INTERVENTIONS:  - Educate patient/family on patient safety including physical limitations  - Instruct patient to call for assistance with activity   - Consider consulting OT/PT to assist with strengthening/mobility based on AM PAC & JH-HLM score  - Consult OT/PT to assist with strengthening/mobility   - Keep Call bell within reach  - Keep bed low and locked with side rails adjusted as appropriate  - Keep  care items and personal belongings within reach  - Initiate and maintain comfort rounds  - Make Fall Risk Sign visible to staff  - Offer Toileting every 2 Hours, in advance of need  - Initiate/Maintain alarm  - Obtain necessary fall risk management equipment:   - Apply yellow socks and bracelet for high fall risk patients  - Consider moving patient to room near nurses station  Outcome: Progressing  Goal: Maintain or return to baseline ADL function  Description: INTERVENTIONS:  -  Assess patient's ability to carry out ADLs; assess patient's baseline for ADL function and identify physical deficits which impact ability to perform ADLs (bathing, care of mouth/teeth, toileting, grooming, dressing, etc.)  - Assess/evaluate cause of self-care deficits   - Assess range of motion  - Assess patient's mobility; develop plan if impaired  - Assess patient's need for assistive devices and provide as appropriate  - Encourage maximum independence but intervene and supervise when necessary  - Involve family in performance of ADLs  - Assess for home care needs following discharge   - Consider OT consult to assist with ADL evaluation and planning for discharge  - Provide patient education as appropriate  - Monitor functional capacity and physical performance, use of AM PAC & JH-HLM   - Monitor gait, balance and fatigue with ambulation    Outcome: Progressing     Problem: DISCHARGE PLANNING  Goal: Discharge to home or other facility with appropriate resources  Description: INTERVENTIONS:  - Identify barriers to discharge w/patient and caregiver  - Arrange for needed discharge resources and transportation as appropriate  - Identify discharge learning needs (meds, wound care, etc.)  - Arrange for interpretive services to assist at discharge as needed  - Refer to Case Management Department for coordinating discharge planning if the patient needs post-hospital services based on physician/advanced practitioner order or complex needs related  to functional status, cognitive ability, or social support system  Outcome: Progressing     Problem: Knowledge Deficit  Goal: Patient/family/caregiver demonstrates understanding of disease process, treatment plan, medications, and discharge instructions  Description: Complete learning assessment and assess knowledge base.  Interventions:  - Provide teaching at level of understanding  - Provide teaching via preferred learning methods  Outcome: Progressing     Problem: NEUROSENSORY - ADULT  Goal: Achieves maximal functionality and self care  Description: INTERVENTIONS  - Monitor swallowing and airway patency with patient fatigue and changes in neurological status  - Encourage and assist patient to increase activity and self care.   - Encourage visually impaired, hearing impaired and aphasic patients to use assistive/communication devices  Outcome: Progressing     Problem: SKIN/TISSUE INTEGRITY - ADULT  Goal: Incision(s), wounds(s) or drain site(s) healing without S/S of infection  Description: INTERVENTIONS  - Assess and document dressing, incision, wound bed, drain sites and surrounding tissue  - Provide patient and family education  - Perform skin care/dressing changes every 24 hours  Outcome: Progressing

## 2025-05-18 NOTE — ASSESSMENT & PLAN NOTE
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

## 2025-05-18 NOTE — ASSESSMENT & PLAN NOTE
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      - Advance to low residue diet  - Accurate I's and O's  - multimodal pain regimen  - As needed antiemetics  - Encourage out of bed and ambulation starting   - Encourage incentive spirometer use  - Hold IV iron infusion   - DVT prophylaxis with subcu heparin  - PT and OT consult for early postoperative ambulation and evaluation  -Dispo pending neurologic evaluation

## 2025-05-19 ENCOUNTER — TELEPHONE (OUTPATIENT)
Dept: FAMILY MEDICINE CLINIC | Facility: CLINIC | Age: 65
End: 2025-05-19

## 2025-05-19 ENCOUNTER — TELEPHONE (OUTPATIENT)
Age: 65
End: 2025-05-19

## 2025-05-19 VITALS
HEIGHT: 73 IN | TEMPERATURE: 98.7 F | OXYGEN SATURATION: 98 % | BODY MASS INDEX: 24.52 KG/M2 | WEIGHT: 185 LBS | HEART RATE: 80 BPM | DIASTOLIC BLOOD PRESSURE: 67 MMHG | SYSTOLIC BLOOD PRESSURE: 110 MMHG | RESPIRATION RATE: 17 BRPM

## 2025-05-19 LAB
BASOPHILS # BLD AUTO: 0.05 THOUSANDS/ÂΜL (ref 0–0.1)
BASOPHILS NFR BLD AUTO: 1 % (ref 0–1)
EOSINOPHIL # BLD AUTO: 0.29 THOUSAND/ÂΜL (ref 0–0.61)
EOSINOPHIL NFR BLD AUTO: 4 % (ref 0–6)
ERYTHROCYTE [DISTWIDTH] IN BLOOD BY AUTOMATED COUNT: 27.7 % (ref 11.6–15.1)
HCT VFR BLD AUTO: 32.8 % (ref 36.5–49.3)
HGB BLD-MCNC: 9.3 G/DL (ref 12–17)
IMM GRANULOCYTES # BLD AUTO: 0.02 THOUSAND/UL (ref 0–0.2)
IMM GRANULOCYTES NFR BLD AUTO: 0 % (ref 0–2)
LYMPHOCYTES # BLD AUTO: 1.18 THOUSANDS/ÂΜL (ref 0.6–4.47)
LYMPHOCYTES NFR BLD AUTO: 18 % (ref 14–44)
MCH RBC QN AUTO: 23.2 PG (ref 26.8–34.3)
MCHC RBC AUTO-ENTMCNC: 28.4 G/DL (ref 31.4–37.4)
MCV RBC AUTO: 82 FL (ref 82–98)
MONOCYTES # BLD AUTO: 0.51 THOUSAND/ÂΜL (ref 0.17–1.22)
MONOCYTES NFR BLD AUTO: 8 % (ref 4–12)
NEUTROPHILS # BLD AUTO: 4.59 THOUSANDS/ÂΜL (ref 1.85–7.62)
NEUTS SEG NFR BLD AUTO: 69 % (ref 43–75)
NRBC BLD AUTO-RTO: 0 /100 WBCS
PLATELET # BLD AUTO: 297 THOUSANDS/UL (ref 149–390)
PMV BLD AUTO: 10.6 FL (ref 8.9–12.7)
RBC # BLD AUTO: 4.01 MILLION/UL (ref 3.88–5.62)
WBC # BLD AUTO: 6.64 THOUSAND/UL (ref 4.31–10.16)

## 2025-05-19 PROCEDURE — 99024 POSTOP FOLLOW-UP VISIT: CPT | Performed by: COLON & RECTAL SURGERY

## 2025-05-19 PROCEDURE — 85025 COMPLETE CBC W/AUTO DIFF WBC: CPT

## 2025-05-19 PROCEDURE — NC001 PR NO CHARGE: Performed by: COLON & RECTAL SURGERY

## 2025-05-19 RX ORDER — ACETAMINOPHEN 325 MG/1
975 TABLET ORAL EVERY 8 HOURS SCHEDULED
Qty: 63 TABLET | Refills: 0 | Status: SHIPPED | OUTPATIENT
Start: 2025-05-19 | End: 2025-05-26

## 2025-05-19 RX ORDER — GABAPENTIN 100 MG/1
100 CAPSULE ORAL 3 TIMES DAILY
Qty: 21 CAPSULE | Refills: 0 | Status: SHIPPED | OUTPATIENT
Start: 2025-05-19 | End: 2025-05-26

## 2025-05-19 RX ORDER — OXYCODONE HYDROCHLORIDE 5 MG/1
5 TABLET ORAL EVERY 6 HOURS PRN
Qty: 16 TABLET | Refills: 0 | Status: SHIPPED | OUTPATIENT
Start: 2025-05-19 | End: 2025-05-23

## 2025-05-19 RX ORDER — METHOCARBAMOL 500 MG/1
500 TABLET, FILM COATED ORAL EVERY 8 HOURS SCHEDULED
Qty: 21 TABLET | Refills: 0 | Status: SHIPPED | OUTPATIENT
Start: 2025-05-19 | End: 2025-05-26

## 2025-05-19 RX ADMIN — GABAPENTIN 100 MG: 100 CAPSULE ORAL at 08:56

## 2025-05-19 RX ADMIN — ACETAMINOPHEN 975 MG: 325 TABLET ORAL at 06:32

## 2025-05-19 RX ADMIN — GABAPENTIN 100 MG: 100 CAPSULE ORAL at 00:32

## 2025-05-19 RX ADMIN — METHOCARBAMOL 500 MG: 500 TABLET ORAL at 00:32

## 2025-05-19 RX ADMIN — METHOCARBAMOL 500 MG: 500 TABLET ORAL at 06:32

## 2025-05-19 NOTE — DISCHARGE INSTR - AVS FIRST PAGE
DISCHARGE INSTRUCTIONS    Follow Up: Follow up with Dr. Alicea on 6/24 at 11:30AM  -Follow-up with neurology in 1 to 2 weeks  -Obtain Brain MRI 6/9 as scheduled    Diet: Low fiber diet for 3 more days then may resume regular diet    Pain: Tylenol 975 mg every 8 hours scheduled for 7 days, gabapentin 100 mg 3 times a day scheduled for 7 days, Robaxin 500 mg every 8 hours scheduled for 7 days. Oxycodone 5 mg as needed for moderate/severe pain every 6 hours for 4 days.    Shower: You may shower over the wound. Do not bathe or use a pool or hot tub until cleared by the physician.    Activity: As tolerated. You may go up and down stairs, walk as much as you are comfortable, but walk at least 3 times each day. Do not lift anything heavier than 15 pounds for at least 2-4 weeks, unless cleared by the doctor.    Driving: Do not drive or make any important decisions while on narcotic pain medication. Generally, you may drive when your off all narcotic pain medications.    Medications: Resume all of your previous medications, unless told otherwise by the doctor. You do not need to take the narcotic pain medications unless you are having significant pain and discomfort.    Call the office: If you are experiencing any of the following, fevers above 101.5° or chills, significant nausea or vomiting, increase in abdominal pain, if the wound develops drainage and/or is excessive redness around the wound, or if you have significant diarrhea or other worsening symptoms.

## 2025-05-19 NOTE — TELEPHONE ENCOUNTER
Called to check in on patient.  We reviewed his admission briefly and overall outside of a random event that the neurologists are not aware of what happened, he has been recovering well.  He has remained active and walked 1/2 a mile today.  He is not on any anticoagulation but he states he will be active at home.  They will wait for pathology until heme/ onc reviews.     Mark Anthony Man DO  [facility]  5/19/2025 5:57 PM

## 2025-05-19 NOTE — ASSESSMENT & PLAN NOTE
Patient had a rapid response stroke alert called on 5/17.  This occurred after patient became weak, and transiently flaccid in his upper extremities, with near LOC.  CT scan showed subcentimeter acute parenchymal hemorrhage in the right thalamus, with stable follow-up CT.    Neurology and Neurosurgery consulted, appreciate recs. Repeat outpatient MRI Brain in 3-4 weeks.

## 2025-05-19 NOTE — DISCHARGE SUMMARY
Discharge Summary - Colorectal   Name: Ramsey Rodgers 65 y.o. 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      - low residue diet as tolerated  - Accurate I's and O's  - multimodal pain regimen  - As needed antiemetics  - Encourage out of bed and ambulation starting   - Encourage incentive spirometer use  - DVT prophylaxis with subcu heparin  - Discharge home today  R thalamocapsular IPH  Patient had a rapid response stroke alert called on 5/17.  This occurred after patient became weak, and transiently flaccid in his upper extremities, with near LOC.  CT scan showed subcentimeter acute parenchymal hemorrhage in the right thalamus, with stable follow-up CT.    Neurology and Neurosurgery consulted, appreciate recs. Repeat outpatient MRI Brain in 3-4 weeks.    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 Alicea  Right thalamocapsular IPH treated with neurology consult and repeat MRI brain on June 9.    HPI: 65-year-old male with a past medical history of anemia and bronchospasm presented for elective surgical intervention as mentioned above.    Procedures Performed: 5/15/2025 robotic extended right colectomy by Dr. 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  - low residue diet as tolerated  - Accurate I's and O's  - multimodal pain regimen  - As needed antiemetics  - Encourage out of bed and ambulation starting   - Encourage incentive spirometer use  - DVT prophylaxis with subcu  heparin  - Discharge home today    R thalamocapsular IPH  Patient had a rapid response stroke alert called on 5/17.  This occurred after patient became weak, and transiently flaccid in his upper extremities, with near LOC.  CT scan showed subcentimeter acute parenchymal hemorrhage in the right thalamus, with stable follow-up CT.    Neurology and Neurosurgery consulted, appreciate recs. Repeat outpatient MRI Brain in 3-4 weeks.     Patient underwent surgical intervention without complications as mentioned above.  Postoperatively he was transferred to the medical surgical floor where he was started on a clear with diet, maintained on IV fluids, PCA Dilaudid pump, DVT prophylaxis, as needed antiemetics and multimodal additional pain control regimen.  His diet was slowly advanced as tolerated throughout his hospital stay.  Patient had a stroke alert noted on postoperative day 2 secondary to difficulty with word finding and generalized weakness.  He underwent a series of CTA head as well as MRI brain and your neurology was consulted to assist with care.  They noted this was likely related to hypotension rather than stroke/TIA or seizure.  Given evidence of head bleed they plan for close monitoring and to hold any AC.    Patient was cleared for discharge from a surgical standpoint on postoperative day 4 as he was tolerating diet without nausea or vomiting, out of bed and ambulate without any assistance, urinating without any difficulties, tolerating pain control with oral medication regimen, and was alert and oriented without any further evidence or workup from neurology standpoint.  He is to set to have any repeat brain MRI in 3 to 4 weeks and to follow-up with neurovascular service.  He is to avoid NSAIDs and antiplatelets at this time.    All discharge instructions as well as postoperative follow-up appointments were discussed with the patient and his wife at bedside, both are in agreement plan, all questions  "answered.    Significant Findings, Care, Treatment and Services Provided:     5/17/2025 CTA stroke alert: Unremarkable CTA neck and brain.  5/17/2025 CT head without contrast: Stable subcentimeter parenchymal hematoma in the right thalamus  5/17/2025 MRI brain: Stable blood products in the right thalamus which correspond to the findings on head CT.     Complications: None    Discharge Day Visit / Exam:   /67   Pulse 80   Temp 98.7 °F (37.1 °C)   Resp 17   Ht 6' 1\" (1.854 m) Comment: data collected from admission  Wt 83.9 kg (185 lb) Comment: data collected from admission  SpO2 98%   BMI 24.41 kg/m²     Discussion with Family: Updated  (wife) at bedside.    Condition at Discharge: good       Discharge instructions/Information to patient and family:   See After Visit Summary (AVS) for information provided to patient and family.      Provisions for Follow-Up Care:  See after visit summary for information related to follow-up care and any pertinent home health orders.      PCP: Mark Anthony Man DO    Disposition: Home    Planned Readmission: No     Discharge Medications:  See after visit summary for reconciled discharge medications provided to patient and family.      Discharge Statement:  I have spent a total time of 30 minutes in caring for this patient on the day of the visit/encounter. >30 minutes of time was spent on: Risks and benefits of tx options, Instructions for management, Patient and family education, Importance of tx compliance, Risk factor reductions, Impressions, Counseling / Coordination of care, Documenting in the medical record, Reviewing / ordering tests, medicine, procedures  , and Communicating with other healthcare professionals .    Valentina Bains     "

## 2025-05-19 NOTE — PROGRESS NOTES
Progress Note - Thoracic    Name: Ramsey Rodgers 65 y.o. 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      - low residue diet as tolerated  - Accurate I's and O's  - multimodal pain regimen  - As needed antiemetics  - Encourage out of bed and ambulation starting   - Encourage incentive spirometer use  - DVT prophylaxis with subcu heparin  - Discharge home today  R thalamocapsular [facility]  Patient had a rapid response stroke alert called on 5/17.  This occurred after patient became weak, and transiently flaccid in his upper extremities, with near LOC.  CT scan showed subcentimeter acute parenchymal hemorrhage in the right thalamus, with stable follow-up CT.    Neurology and Neurosurgery consulted, appreciate recs. Repeat outpatient MRI Brain in 3-4 weeks.      Please contact the SecureChat role for the Colorectal service with any questions/concerns.    Subjective   No acute events overnight. Afebrile, hemodynamically stable. Tolerating diet. No nausea, or vomiting, fevers or chills. Pain controlled. Having bowel movements.    Objective :  Temp:  [98.1 °F (36.7 °C)-98.9 °F (37.2 °C)] 98.2 °F (36.8 °C)  HR:  [61-84] 61  BP: (105-150)/(63-93) 105/63  Resp:  [15-16] 16  SpO2:  [96 %-98 %] 98 %  O2 Device: None (Room air)    I/O         05/17 0701  05/18 0700 05/18 0701  05/19 0700    P.O. 720 720    I.V. (mL/kg) 112 (1.3)     Total Intake(mL/kg) 832 (9.9) 720 (8.6)    Urine (mL/kg/hr) 450 (0.2)     Total Output 450     Net +382 +720          Unmeasured Urine Occurrence 7 x             Physical Exam:  General: No acute distress, alert and oriented  CV: Well perfused, regular rate  Lungs: Normal work of breathing, no increased respiratory effort  Abdomen: Soft, appropriately-tender, non-distended. Incision(s)  clean, dry and intact.  Extremities: No edema, clubbing or cyanosis  Skin: Warm, dry      Lab Results: I have reviewed the following results:  Recent Labs     05/18/25  0504 05/19/25  0528   WBC 5.90 6.64   HGB 8.3* 9.3*   HCT 28.7* 32.8*    297   SODIUM 139  --    K 3.5  --      --    CO2 25  --    BUN 5  --    CREATININE 0.81  --    GLUC 91  --    MG 1.9  --    PHOS 3.8  --        Imaging Results Review: No pertinent imaging studies reviewed.  Other Study Results Review: No additional pertinent studies reviewed.    VTE Pharmacologic Prophylaxis: VTE covered by:    None     VTE Mechanical Prophylaxis: sequential compression device

## 2025-05-19 NOTE — PLAN OF CARE
Problem: PAIN - ADULT  Goal: Verbalizes/displays adequate comfort level or baseline comfort level  Description: Interventions:  - Encourage patient to monitor pain and request assistance  - Assess pain using appropriate pain scale  - Administer analgesics as ordered based on type and severity of pain and evaluate response  - Implement non-pharmacological measures as appropriate and evaluate response  - Consider cultural and social influences on pain and pain management  - Notify physician/advanced practitioner if interventions unsuccessful or patient reports new pain  - Educate patient/family on pain management process including their role and importance of  reporting pain   - Provide non-pharmacologic/complimentary pain relief interventions  Outcome: Progressing     Problem: INFECTION - ADULT  Goal: Absence or prevention of progression during hospitalization  Description: INTERVENTIONS:  - Assess and monitor for signs and symptoms of infection  - Monitor lab/diagnostic results  - Monitor all insertion sites, i.e. indwelling lines, tubes, and drains  - Monitor endotracheal if appropriate and nasal secretions for changes in amount and color  - [place] appropriate cooling/warming therapies per order  - Administer medications as ordered  - Instruct and encourage patient and family to use good hand hygiene technique  - Identify and instruct in appropriate isolation precautions for identified infection/condition  Outcome: Progressing     Problem: SAFETY ADULT  Goal: Patient will remain free of falls  Description: INTERVENTIONS:  - Educate patient/family on patient safety including physical limitations  - Instruct patient to call for assistance with activity   - Consider consulting OT/PT to assist with strengthening/mobility based on AM PAC & JH-HLM score  - Consult OT/PT to assist with strengthening/mobility   - Keep Call bell within reach  - Keep bed low and locked with side rails adjusted as appropriate  - Keep  care items and personal belongings within reach  - Initiate and maintain comfort rounds  - Make Fall Risk Sign visible to staff  - Offer Toileting every  Hours, in advance of need  - Initiate/Maintain alarm  - Obtain necessary fall risk management equipment:   - Apply yellow socks and bracelet for high fall risk patients  - Consider moving patient to room near nurses station  Outcome: Progressing

## 2025-05-19 NOTE — PLAN OF CARE
Problem: NEUROSENSORY - ADULT  Goal: Achieves maximal functionality and self care  Description: INTERVENTIONS  - Monitor swallowing and airway patency with patient fatigue and changes in neurological status  - Encourage and assist patient to increase activity and self care.   - Encourage visually impaired, hearing impaired and aphasic patients to use assistive/communication devices  Outcome: Progressing     Problem: SKIN/TISSUE INTEGRITY - ADULT  Goal: Incision(s), wounds(s) or drain site(s) healing without S/S of infection  Description: INTERVENTIONS  - Assess and document dressing, incision, wound bed, drain sites and surrounding tissue  - Provide patient and family education    Outcome: Progressing     Problem: Neurological Deficit  Goal: Neurological status is stable or improving  Description: Interventions:  - Monitor and assess patient's level of consciousness, motor function, sensory function, and level of assistance needed for ADLs.   - Monitor and report changes from baseline. Collaborate with interdisciplinary team to initiate plan and implement interventions as ordered.   - Provide and maintain a safe environment.  - Consider seizure precautions.  - Consider fall precautions.  - Consider aspiration precautions.  - Consider bleeding precautions.  Outcome: Progressing     Problem: Activity Intolerance/Impaired Mobility  Goal: Mobility/activity is maintained at optimum level for patient  Description: Interventions:  - Assess and monitor patient  barriers to mobility and need for assistive/adaptive devices.  - Assess patient's emotional response to limitations.  - Collaborate with interdisciplinary team and initiate plans and interventions as ordered.  - Encourage independent activity per ability.  - Maintain proper body alignment.  - Perform active/passive rom as tolerated/ordered.  - Plan activities to conserve energy.  - Turn patient as appropriate  Outcome: Progressing     Problem: Communication  Impairment  Goal: Ability to express needs and understand communication  Description: Assess patient's communication skills and ability to understand information.  Patient will demonstrate use of effective communication techniques, alternative methods of communication and understanding even if not able to speak.     - Encourage communication and provide alternate methods of communication as needed.  - Collaborate with case management/ for discharge needs.  - Include patient/family/caregiver in decisions related to communication.  Outcome: Progressing     Problem: Nutrition  Goal: Nutrition/Hydration status is improving  Description: Monitor and assess patient's nutrition/hydration status for malnutrition (ex- brittle hair, bruises, dry skin, pale skin and conjunctiva, muscle wasting, smooth red tongue, and disorientation). Collaborate with interdisciplinary team and initiate plan and interventions as ordered.  Monitor patient's weight and dietary intake as ordered or per policy. Utilize nutrition screening tool and intervene per policy. Determine patient's food preferences and provide high-protein, high-caloric foods as appropriate.     - Assist patient with eating.  - Allow adequate time for meals.  - Encourage patient to take dietary supplement as ordered.  - Collaborate with clinical nutritionist.  - Include patient/family/caregiver in decisions related to nutrition.  Outcome: Progressing

## 2025-05-19 NOTE — ASSESSMENT & PLAN NOTE
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      - low residue diet as tolerated  - Accurate I's and O's  - multimodal pain regimen  - As needed antiemetics  - Encourage out of bed and ambulation starting   - Encourage incentive spirometer use  - DVT prophylaxis with subcu heparin  - Discharge home today

## 2025-05-19 NOTE — TELEPHONE ENCOUNTER
1ST ATTEMPT,     [employer]IA [employer]     Thank you,     Beryl LEE/ ZEE VANN/ R thalamocapsular [facility]     DC- HOME- 5/19/2025    ---- Message from Brian Bae DO sent at 5/18/2025  3:10 PM EDT -----  Regarding: JESUS CHAVES Ana Maria will need follow-up in in 6 weeks with neurovascular team for Other in 60 minute appointment. They will require a repeat MRI within 3-4 weeks

## 2025-05-20 ENCOUNTER — TRANSITIONAL CARE MANAGEMENT (OUTPATIENT)
Dept: FAMILY MEDICINE CLINIC | Facility: CLINIC | Age: 65
End: 2025-05-20

## 2025-05-20 DIAGNOSIS — C18.9 ADENOCARCINOMA OF COLON (HCC): Primary | ICD-10-CM

## 2025-05-20 PROCEDURE — 88309 TISSUE EXAM BY PATHOLOGIST: CPT | Performed by: PATHOLOGY

## 2025-05-20 NOTE — UTILIZATION REVIEW
NOTIFICATION OF ADMISSION DISCHARGE   This is a Notification of Discharge from [facility]. Please be advised that this patient has been discharge from our facility. Below you will find the admission and discharge date and time including the patient’s disposition.   UTILIZATION REVIEW CONTACT:  Utilization Review Assistants  Network Utilization Review Department  Phone: [phone] x carefully listen to the prompts. All voicemails are confidential.  Email: [email or web address]     ADMISSION INFORMATION  PRESENTATION DATE: 5/15/2025 10:44 AM  OBERVATION ADMISSION DATE: N/A  INPATIENT ADMISSION DATE: 5/15/25  3:00 PM   DISCHARGE DATE: 5/19/2025  9:49 AM   DISPOSITION:Home/Self Care    Network Utilization Review Department  ATTENTION: Please call with any questions or concerns to [phone] and carefully listen to the prompts so that you are directed to the right person. All voicemails are confidential.   For Discharge needs, contact Care Management DC Support Team at [phone] opt. 2  Send all requests for admission clinical reviews, approved or denied determinations and any other requests to dedicated fax number below belonging to the campus where the patient is receiving treatment. List of dedicated fax numbers for the Facilities:  FACILITY NAME UR FAX NUMBER   ADMISSION DENIALS (Administrative/Medical Necessity) [phone]   DISCHARGE SUPPORT TEAM ([facility]) [phone]   PARENT CHILD HEALTH (Maternity/NICU/Pediatrics) [phone]   [facility] [phone]   [facility] [phone]   [facility] [phone]   [facility] [phone]   [facility] [phone]   [facility] [phone]   [facility] [phone]   [facility] [phone]   [facility]  [facility] [phone]   [facility] [phone]   [facility] [phone]   [facility] [phone]

## 2025-05-20 NOTE — TELEPHONE ENCOUNTER
Pt's [facility] 12[facility]30[facility] with  and placed on wait list high priority. Pt's wife is requesting a phone call asap with a sooner appt she stated pt cannot wait to be seen until November.    Pt will be away from July 7th - July 15th.

## 2025-05-21 ENCOUNTER — RESULTS FOLLOW-UP (OUTPATIENT)
Dept: OTHER | Facility: HOSPITAL | Age: 65
End: 2025-05-21

## 2025-05-23 ENCOUNTER — TELEPHONE (OUTPATIENT)
Dept: NEUROLOGY | Facility: CLINIC | Age: 65
End: 2025-05-23

## 2025-05-23 NOTE — TELEPHONE ENCOUNTER
Post CVA Discharge Follow Up  Hospitalization: 5/15/25-5/19/25    Called patient to obtain an update. Since discharge, he denies experiencing any new or worsening stroke-like symptoms. Patient denies the presence of any residual stroke symptoms following hospitalization and claims he has returned to baseline functioning.     he is ambulating independently as well as preforming his own ADLs. Patient manages his own medications, appointments, and affairs.    Reviewed appointments -. Patient scheduled for the following: HFU moved up to 9/19 with Dr Navarro and is on the wait list. He has MRI Brain scheduled for 6/8/25. He plans to see his PCP after MRI is completed.       During this call, we reviewed stroke type, symptoms, personal risk factors and management, medications, and resources. Patient verbalizes understanding.    As for risk factors, patient reports:  he is a non smoker   he reports following a healthy diet    All of his questions were addressed. At the conclusion of the conversation, patient denies having any further questions or concerns.

## 2025-05-29 ENCOUNTER — DOCUMENTATION (OUTPATIENT)
Dept: HEMATOLOGY ONCOLOGY | Facility: CLINIC | Age: 65
End: 2025-05-29

## 2025-05-29 NOTE — PROGRESS NOTES
Patient was not presented today at the [facility]. Moved to the next lower GI [facility] on 6/12/2025 per Dr. Alicea.

## 2025-06-01 LAB
CARIS GENOMIC LOH - EXOME: NORMAL
CARIS HER2/NEU: NEGATIVE
CARIS HLA-A: NORMAL
CARIS HLA-B: NORMAL
CARIS HLA-C: NORMAL
CARIS MSI - EXOME: NORMAL
CARIS PD-L1 (SP142): NEGATIVE
CARIS TMB - EXOME: NORMAL

## 2025-06-08 ENCOUNTER — HOSPITAL ENCOUNTER (OUTPATIENT)
Dept: RADIOLOGY | Facility: HOSPITAL | Age: 65
Discharge: HOME | End: 2025-06-08
Payer: COMMERCIAL

## 2025-06-08 DIAGNOSIS — R55 SYNCOPE, UNSPECIFIED SYNCOPE TYPE: ICD-10-CM

## 2025-06-08 DIAGNOSIS — I61.9 HEMORRHAGIC STROKE (HCC): ICD-10-CM

## 2025-06-08 PROCEDURE — A9585 GADOBUTROL INJECTION: HCPCS

## 2025-06-08 PROCEDURE — 70553 MRI BRAIN STEM W/O & W/DYE: CPT

## 2025-06-08 RX ORDER — GADOBUTROL 604.72 MG/ML
8 INJECTION INTRAVENOUS
Status: COMPLETED | OUTPATIENT
Start: 2025-06-08 | End: 2025-06-08

## 2025-06-08 RX ADMIN — GADOBUTROL 8 ML: 604.72 INJECTION INTRAVENOUS at 05:28

## 2025-06-12 ENCOUNTER — DOCUMENTATION (OUTPATIENT)
Dept: HEMATOLOGY ONCOLOGY | Facility: CLINIC | Age: 65
End: 2025-06-12

## 2025-06-12 NOTE — PROGRESS NOTES
RECTAL/GI MULTIDISCIPLINARY CASE REVIEW    DATE:  6/12/2025    PRESENTING DOCTOR: Dr. Alicea    DIAGNOSIS: Cancer of hepatic flexure    Ramsey Rodgers is a 65 y.o. male who was presented at the Rectal/GI Multidisciplinary Conference today.    PHYSICIAN RECOMMENDED PLAN:    Patient had right colon resection on 5/15/2025. Presented at tumor board to review post surgical pathology.  -Recommend surveillance with imaging, labs and repeat colonoscopy.   -Patient is scheduled with Dr. Alicea on 6/24/2025 for follow up.      Team agreed to plan.    The final treatment plan will be left to the discretion of the patient and the treating physician.     DISCLAIMERS:  TO THE TREATING PHYSICIAN:  This conference is a meeting of clinicians from various specialty areas who evaluate and discuss patients for whom a multidisciplinary treatment approach is being considered. Please note that the above opinion was a consensus of the conference attendees and is intended only to assist in quality care of the discussed patient.  The responsibility for follow up on the input given during the conference, along with any final decisions regarding plan of care, is that of the patient and the patient's provider. Accordingly, appointments have only been recommended based on this information and have NOT been scheduled unless otherwise noted.      TO THE PATIENT:  This summary is a brief record of major aspects of your cancer treatment. You may choose to share a copy with any of your doctors or nurses. However, this is not a detailed or comprehensive record of your care.      NCCN guidelines were readily available for review at this discussion

## 2025-06-19 ENCOUNTER — TELEPHONE (OUTPATIENT)
Dept: NEUROLOGY | Facility: CLINIC | Age: 65
End: 2025-06-19

## 2025-06-19 NOTE — TELEPHONE ENCOUNTER
When following up on MRI of brain per inpatient Neurology Note Plan:     - Obtain MRI brain w/ w/o repeat in 3-4 wks as ordered  - F/u in 6 wks after completing the MRIB  - From neuro perspective should remain off AP/AC  - AP/AC can be considered per NSGY or o/p neuro    S/w patient his is not currently on AP/AC. Will reach out to Dr. Navarro to review MRI from 06/08/2025 and advice if AP/AC is recommended at this time

## 2025-06-20 NOTE — PROGRESS NOTES
Name: Ramsey Rodgers      : 1960      MRN: [id number]  Encounter Provider: Brock Alicea MD  Encounter Date: 2025   Encounter department: [facility]  :  Assessment & Plan  Cancer of hepatic flexure (HCC)  Doing quite well from robotic assisted extended right hemicolectomy 5/15/2025, approximately 6 weeks out, perioperative had question of thalamic bleed.  He is having normalizing bowel movements at this time.     brain MRI Stable blood products within the right thalamus with persistent T1 hyperintensity limiting evaluation for underlying enhancement. Findings may represent a cavernous angioma given the persistence and imaging findings versus a small residual thalamic hemorrhage. No new hemorrhage, he has neurology followup.    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), MSI low, perineural/lymphovascular negative, all margins clean, this is a stage IIA and considered a technical cure, CARIS results  Somatic -- Detected     Contains abnormal data SOX9 ( Tier 1 or 2: Strong or potential significance )   NM_000346.3(SOX9):c.473C>T(p.A158V)VAF: 51%    Contains abnormal data TP53 ( Tier 1 or 2: Strong or potential significance )   NM_000546.5(TP53):c.524G>A(p.R175H)VAF: 57%    Contains abnormal data APC ( Tier 2: Potential significance )   NM_000038.5(APC):c.3425delA(p.B4963fa)VAF: 20%    Contains abnormal data APC ( Tier 2: Potential significance )   NM_000038.5(APC):c.1270C>T(p.Q424*)VAF: 34% .....            Plan:  -Oncology followup to discuss any changes in standard surveillance for Stage II colon cancer, CARIS somatic mutation results  -Colonoscopy 9months recall  -CEA end of 2025  CC:  DO Dr. Bud Lee    Orders:  •  Ambulatory Referral to Hematology / Oncology; Future  •  CEA; Future           History of Present Illness   PEG Mustafa  "ANASTACIO Rodgers is a 65 y.o. male who presents for a post operative evaluation. Patient was last seen in the office on 4/29/2025.    Patient is status post resection colon right laparoscopic with robotics on 5/15/2025.    Patient reports he is doing well no complaints    Patient has 1 bowel movement a day. Stool Consistency varies    Operative Findings:  -tattoo located hepatic flexure,ileum to transverse colon, side-to-side functional end-to-end stapled ileocolic anastomosis,good fluorescence Firefly    -Arm 1 Suprapubic off midline to Right,8mm port  Fenestrated Bipolar  -Arm 2 Supraumbilical 12mm port camera, eventually enlarged to extraction incision  -Arm 3-left midepigastric vessel sealer, 8mm port  -Arm4-LUQ TipUp 8mm  -Assist LUQ 5mm port    Final Diagnosis  A. Terminal ileum, appendix, right colon (right hemicolectomy):  - Adenocarcinoma with mucinous features (6.0cm)  - Twenty (20) lymph nodes negative for carcinoma (0/20)  - Margins negative for carcinoma   - See staging synoptic (pT3N0)  Comment:  - MMR IHC performed on the prior biopsy showed no nuclear loss of expression: Low probability of MSI-H.   - Caris testing ordered (A7).       Lab Results   Component Value Date    CEA 1.8 04/22/2025     Lab Results   Component Value Date    WBC 6.64 05/19/2025    HGB 9.3 (L) 05/19/2025    HCT 32.8 (L) 05/19/2025    MCV 82 05/19/2025     05/19/2025     Lab Results   Component Value Date    SODIUM 139 05/18/2025    K 3.5 05/18/2025     05/18/2025    CO2 25 05/18/2025    AGAP 6 05/18/2025    BUN 5 05/18/2025    CREATININE 0.81 05/18/2025    GLUC 91 05/18/2025    CALCIUM 8.0 (L) 05/18/2025    AST 15 03/25/2025    ALT 8 03/25/2025    TP 6.4 03/25/2025    TBILI 0.7 03/25/2025    EGFR 94 05/18/2025     Review of Systems    Objective     Ht 6' 1\" (1.854 m)   Wt 83 kg (183 lb)   BMI 24.14 kg/m²      Physical Exam  Pulmonary:      Effort: Pulmonary effort is normal.   Abdominal:      Palpations: Abdomen is soft. "      Tenderness: There is no abdominal tenderness.      Hernia: No hernia is present.     Musculoskeletal:      Right lower leg: No edema.      Left lower leg: No edema.     Neurological:      Mental Status: He is alert.     Psychiatric:         Mood and Affect: Mood normal.

## 2025-06-24 ENCOUNTER — OFFICE VISIT (OUTPATIENT)
Age: 65
End: 2025-06-24

## 2025-06-24 ENCOUNTER — PATIENT OUTREACH (OUTPATIENT)
Dept: HEMATOLOGY ONCOLOGY | Facility: CLINIC | Age: 65
End: 2025-06-24

## 2025-06-24 VITALS — BODY MASS INDEX: 24.25 KG/M2 | HEIGHT: 73 IN | WEIGHT: 183 LBS

## 2025-06-24 DIAGNOSIS — C18.3 CANCER OF HEPATIC FLEXURE (HCC): Primary | ICD-10-CM

## 2025-06-24 PROCEDURE — 99024 POSTOP FOLLOW-UP VISIT: CPT | Performed by: COLON & RECTAL SURGERY

## 2025-06-24 NOTE — PROGRESS NOTES
NN phone outreach to pt, no answer, LVM stating my name title and reason for call (to discuss new appt w Dr. Farrell scheduled). Pending call back from pt to confirm appt details.     Pt called back when I was unavailable and LVM stating no need to book this week, he is on his way to vacation and asked to schedule at the end of July. Will open referral and have schedulers set up appt late July.

## 2025-06-24 NOTE — ASSESSMENT & PLAN NOTE
Doing quite well from robotic assisted extended right hemicolectomy 5/15/2025, approximately 6 weeks out, perioperative had question of thalamic bleed.  He is having normalizing bowel movements at this time.    6/8 brain MRI Stable blood products within the right thalamus with persistent T1 hyperintensity limiting evaluation for underlying enhancement. Findings may represent a cavernous angioma given the persistence and imaging findings versus a small residual thalamic hemorrhage. No new hemorrhage, he has neurology followup.    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), MSI low, perineural/lymphovascular negative, all margins clean, this is a stage IIA and considered a technical cure, CARIS results  Somatic -- Detected     Contains abnormal data SOX9 ( Tier 1 or 2: Strong or potential significance )   NM_000346.3(SOX9):c.473C>T(p.A158V)VAF: 51%    Contains abnormal data TP53 ( Tier 1 or 2: Strong or potential significance )   NM_000546.5(TP53):c.524G>A(p.R175H)VAF: 57%    Contains abnormal data APC ( Tier 2: Potential significance )   NM_000038.5(APC):c.3425delA(p.K4021mn)VAF: 20%    Contains abnormal data APC ( Tier 2: Potential significance )   NM_000038.5(APC):c.1270C>T(p.Q424*)VAF: 34% .....            Plan:  -Oncology followup to discuss any changes in standard surveillance for Stage II colon cancer, LULU somatic mutation results  -Colonoscopy 9months recall  -CEA end of October 2025  CC:  DO Dr. Bud Lee    Orders:    Ambulatory Referral to Hematology / Oncology; Future    CEA; Future

## (undated) DEVICE — INSULATED BLADE ELECTRODE;CAUTION: FOR MANUFACTURING, PROCESSING, OR REPACKING.: Brand: EDGE

## (undated) DEVICE — ANTIBACTERIAL UNDYED BRAIDED (POLYGLACTIN 910), SYNTHETIC ABSORBABLE SUTURE: Brand: COATED VICRYL

## (undated) DEVICE — DRAPE SHEET X-LG

## (undated) DEVICE — 2, DISPOSABLE SUCTION/IRRIGATOR WITHOUT DISPOSABLE TIP: Brand: STRYKEFLOW

## (undated) DEVICE — STAPLER WITH DST SERIES TECHNOLOGY: Brand: GIA

## (undated) DEVICE — VESSEL SEALER EXTEND: Brand: ENDOWRIST

## (undated) DEVICE — COLUMN DRAPE

## (undated) DEVICE — POOLE SUCTION HANDLE: Brand: CARDINAL HEALTH

## (undated) DEVICE — NEPTUNE E-SEP SMOKE EVACUATION PENCIL, COATED, 70MM BLADE, PUSH BUTTON SWITCH: Brand: NEPTUNE E-SEP

## (undated) DEVICE — TRAY FOLEY 16FR URIMETER SURESTEP

## (undated) DEVICE — TIP COVER ACCESSORY

## (undated) DEVICE — INTENDED FOR TISSUE SEPARATION, AND OTHER PROCEDURES THAT REQUIRE A SHARP SURGICAL BLADE TO PUNCTURE OR CUT.: Brand: BARD-PARKER SAFETY BLADES SIZE 15, STERILE

## (undated) DEVICE — MONOPOLAR CURVED SCISSORS: Brand: ENDOWRIST

## (undated) DEVICE — STAPLER WITH DST SERIES TECHNOLOGY: Brand: TA

## (undated) DEVICE — SUT MONOCRYL 4-0 PS-2 18 IN Y496G

## (undated) DEVICE — ARM DRAPE

## (undated) DEVICE — VIOLET BRAIDED (POLYGLACTIN 910), SYNTHETIC ABSORBABLE SUTURE: Brand: COATED VICRYL

## (undated) DEVICE — PACK PBDS STERILE LAP LITHOTOMY RF

## (undated) DEVICE — GLOVE INDICATOR PI UNDERGLOVE SZ 8.5 BLUE

## (undated) DEVICE — TUBING SMOKE EVAC W/FILTRATION DEVICE PLUMEPORT ACTIV

## (undated) DEVICE — SUT VICRYL PLUS 0 54IN VCP287G

## (undated) DEVICE — TROCAR: Brand: KII SLEEVE

## (undated) DEVICE — INSUFLATION TUBING INSUFLOW (LEXION)

## (undated) DEVICE — REDUCER: Brand: ENDOWRIST

## (undated) DEVICE — ELECTRO LUBE IS A SINGLE PATIENT USE DEVICE THAT IS INTENDED TO BE USED ON ELECTROSURGICAL ELECTRODES TO REDUCE STICKING.: Brand: KEY SURGICAL ELECTRO LUBE

## (undated) DEVICE — WOUND RETRACTOR AND PROTECTOR: Brand: ALEXIS O WOUND PROTECTOR-RETRACTOR

## (undated) DEVICE — VISUALIZATION SYSTEM: Brand: CLEARIFY

## (undated) DEVICE — GLOVE SRG BIOGEL 8

## (undated) DEVICE — REM POLYHESIVE ADULT PATIENT RETURN ELECTRODE: Brand: VALLEYLAB

## (undated) DEVICE — CLAMP TOWEL TUBING DISPOSABLE

## (undated) DEVICE — SUT PDS PLUS 1 CTX 36IN PDP371T

## (undated) DEVICE — 40601 PROLONGED POSITIONING SYSTEM: Brand: 40601 PROLONGED POSITIONING SYSTEM

## (undated) DEVICE — TIP-UP FENESTRATED GRASPER: Brand: ENDOWRIST

## (undated) DEVICE — MEDI-VAC YANK SUCT HNDL W/TPRD BULBOUS TIP: Brand: CARDINAL HEALTH

## (undated) DEVICE — EXOFIN PRECISION PEN HIGH VISCOSITY TOPICAL SKIN ADHESIVE: Brand: EXOFIN PRECISION PEN, 1G

## (undated) DEVICE — SEAL

## (undated) DEVICE — FENESTRATED BIPOLAR FORCEPS: Brand: ENDOWRIST

## (undated) DEVICE — 3M™ IOBAN™ 2 ANTIMICROBIAL INCISE DRAPE 6650EZ: Brand: IOBAN™ 2

## (undated) DEVICE — BLADELESS OBTURATOR: Brand: WECK VISTA